# Patient Record
Sex: FEMALE | Race: WHITE | Employment: OTHER | URBAN - METROPOLITAN AREA
[De-identification: names, ages, dates, MRNs, and addresses within clinical notes are randomized per-mention and may not be internally consistent; named-entity substitution may affect disease eponyms.]

---

## 2017-01-04 ENCOUNTER — TELEPHONE (OUTPATIENT)
Dept: FAMILY MEDICINE CLINIC | Age: 47
End: 2017-01-04

## 2017-01-04 DIAGNOSIS — M48.061 LUMBAR STENOSIS: ICD-10-CM

## 2017-01-04 DIAGNOSIS — M51.26 DISC DISPLACEMENT, LUMBAR: Primary | ICD-10-CM

## 2017-01-09 RX ORDER — LOSARTAN POTASSIUM 25 MG/1
25 TABLET ORAL DAILY
Qty: 30 TABLET | Refills: 2 | Status: SHIPPED | OUTPATIENT
Start: 2017-01-09 | End: 2017-04-07 | Stop reason: SDUPTHER

## 2017-01-24 ENCOUNTER — TELEPHONE (OUTPATIENT)
Dept: BARIATRICS/WEIGHT MGMT | Age: 47
End: 2017-01-24

## 2017-02-03 ENCOUNTER — OFFICE VISIT (OUTPATIENT)
Dept: FAMILY MEDICINE CLINIC | Age: 47
End: 2017-02-03

## 2017-02-03 DIAGNOSIS — I87.2 CHRONIC VENOUS STASIS DERMATITIS OF BOTH LOWER EXTREMITIES: ICD-10-CM

## 2017-02-03 DIAGNOSIS — L03.90 CELLULITIS, UNSPECIFIED CELLULITIS SITE: Primary | ICD-10-CM

## 2017-02-03 PROCEDURE — 99214 OFFICE O/P EST MOD 30 MIN: CPT | Performed by: FAMILY MEDICINE

## 2017-02-03 RX ORDER — SULFAMETHOXAZOLE AND TRIMETHOPRIM 800; 160 MG/1; MG/1
1 TABLET ORAL 2 TIMES DAILY
COMMUNITY
End: 2017-02-03

## 2017-02-03 RX ORDER — SULFAMETHOXAZOLE AND TRIMETHOPRIM 800; 160 MG/1; MG/1
1 TABLET ORAL 2 TIMES DAILY
Qty: 20 TABLET | Refills: 0 | Status: SHIPPED | OUTPATIENT
Start: 2017-02-03 | End: 2017-02-13

## 2017-02-06 VITALS
WEIGHT: 293 LBS | HEIGHT: 68 IN | HEART RATE: 88 BPM | DIASTOLIC BLOOD PRESSURE: 90 MMHG | SYSTOLIC BLOOD PRESSURE: 150 MMHG | BODY MASS INDEX: 44.41 KG/M2 | OXYGEN SATURATION: 96 % | TEMPERATURE: 98.7 F

## 2017-02-06 ASSESSMENT — ENCOUNTER SYMPTOMS
RHINORRHEA: 0
NAIL CHANGES: 0
COUGH: 0
VOMITING: 0
DIARRHEA: 0
SHORTNESS OF BREATH: 0
EYE PAIN: 0
SORE THROAT: 0

## 2017-02-20 RX ORDER — VALACYCLOVIR HYDROCHLORIDE 1 G/1
TABLET, FILM COATED ORAL
Qty: 8 TABLET | Refills: 1 | Status: SHIPPED | OUTPATIENT
Start: 2017-02-20 | End: 2018-06-25 | Stop reason: SDUPTHER

## 2017-03-07 ENCOUNTER — TELEPHONE (OUTPATIENT)
Dept: BARIATRICS/WEIGHT MGMT | Age: 47
End: 2017-03-07

## 2017-03-14 ENCOUNTER — TELEPHONE (OUTPATIENT)
Dept: FAMILY MEDICINE CLINIC | Age: 47
End: 2017-03-14

## 2017-03-14 ENCOUNTER — OFFICE VISIT (OUTPATIENT)
Dept: BARIATRICS/WEIGHT MGMT | Age: 47
End: 2017-03-14

## 2017-03-14 VITALS
WEIGHT: 293 LBS | RESPIRATION RATE: 16 BRPM | HEIGHT: 67 IN | BODY MASS INDEX: 45.99 KG/M2 | DIASTOLIC BLOOD PRESSURE: 100 MMHG | SYSTOLIC BLOOD PRESSURE: 178 MMHG

## 2017-03-14 DIAGNOSIS — I10 HYPERTENSION, ESSENTIAL: ICD-10-CM

## 2017-03-14 DIAGNOSIS — E66.01 MORBID OBESITY WITH BMI OF 50.0-59.9, ADULT (HCC): Primary | ICD-10-CM

## 2017-03-14 DIAGNOSIS — G47.33 SLEEP APNEA, OBSTRUCTIVE: ICD-10-CM

## 2017-03-14 PROBLEM — G43.909 MIGRAINE: Status: ACTIVE | Noted: 2017-03-14

## 2017-03-14 PROBLEM — G47.419 NARCOLEPSY: Status: ACTIVE | Noted: 2017-03-14

## 2017-03-14 PROBLEM — F32.A DEPRESSION: Status: ACTIVE | Noted: 2017-03-14

## 2017-03-14 PROBLEM — F41.9 ANXIETY: Status: ACTIVE | Noted: 2017-03-14

## 2017-03-14 PROBLEM — M79.7 FIBROMYALGIA: Status: ACTIVE | Noted: 2017-03-14

## 2017-03-14 PROBLEM — M19.90 ARTHRITIS: Status: ACTIVE | Noted: 2017-03-14

## 2017-03-14 PROBLEM — M54.9 BACK PAIN: Status: ACTIVE | Noted: 2017-03-14

## 2017-03-14 PROCEDURE — 99244 OFF/OP CNSLTJ NEW/EST MOD 40: CPT | Performed by: SURGERY

## 2017-03-14 RX ORDER — DEXTROAMPHETAMINE SULFATE 10 MG/1
10 TABLET ORAL 2 TIMES DAILY
COMMUNITY

## 2017-03-14 RX ORDER — DEXTROAMPHETAMINE SACCHARATE, AMPHETAMINE ASPARTATE MONOHYDRATE, DEXTROAMPHETAMINE SULFATE AND AMPHETAMINE SULFATE 7.5; 7.5; 7.5; 7.5 MG/1; MG/1; MG/1; MG/1
30 CAPSULE, EXTENDED RELEASE ORAL 2 TIMES DAILY
COMMUNITY

## 2017-03-16 DIAGNOSIS — E66.01 MORBID OBESITY WITH BMI OF 50.0-59.9, ADULT (HCC): ICD-10-CM

## 2017-03-16 DIAGNOSIS — G47.33 SLEEP APNEA, OBSTRUCTIVE: ICD-10-CM

## 2017-03-16 DIAGNOSIS — I10 HYPERTENSION, ESSENTIAL: ICD-10-CM

## 2017-03-16 LAB
BASOPHILS ABSOLUTE: 0.1 K/UL (ref 0–0.2)
BASOPHILS RELATIVE PERCENT: 0.7 %
EOSINOPHILS ABSOLUTE: 0.2 K/UL (ref 0–0.6)
EOSINOPHILS RELATIVE PERCENT: 2.1 %
HCT VFR BLD CALC: 37.4 % (ref 36–48)
HEMOGLOBIN: 12.2 G/DL (ref 12–16)
LYMPHOCYTES ABSOLUTE: 2.3 K/UL (ref 1–5.1)
LYMPHOCYTES RELATIVE PERCENT: 30.7 %
MCH RBC QN AUTO: 24.9 PG (ref 26–34)
MCHC RBC AUTO-ENTMCNC: 32.5 G/DL (ref 31–36)
MCV RBC AUTO: 76.7 FL (ref 80–100)
MONOCYTES ABSOLUTE: 0.4 K/UL (ref 0–1.3)
MONOCYTES RELATIVE PERCENT: 5.9 %
NEUTROPHILS ABSOLUTE: 4.5 K/UL (ref 1.7–7.7)
NEUTROPHILS RELATIVE PERCENT: 60.6 %
PDW BLD-RTO: 15.5 % (ref 12.4–15.4)
PLATELET # BLD: 281 K/UL (ref 135–450)
PMV BLD AUTO: 7.5 FL (ref 5–10.5)
RBC # BLD: 4.88 M/UL (ref 4–5.2)
WBC # BLD: 7.5 K/UL (ref 4–11)

## 2017-03-17 ENCOUNTER — HOSPITAL ENCOUNTER (OUTPATIENT)
Dept: PULMONOLOGY | Age: 47
Discharge: HOME | End: 2017-03-17
Attending: SURGERY | Admitting: SURGERY

## 2017-03-17 ENCOUNTER — OFFICE VISIT (OUTPATIENT)
Dept: FAMILY MEDICINE CLINIC | Age: 47
End: 2017-03-17

## 2017-03-17 DIAGNOSIS — E66.01 MORBID OBESITY, UNSPECIFIED OBESITY TYPE (HCC): ICD-10-CM

## 2017-03-17 DIAGNOSIS — I10 ESSENTIAL HYPERTENSION: Primary | ICD-10-CM

## 2017-03-17 DIAGNOSIS — E66.01 MORBID (SEVERE) OBESITY DUE TO EXCESS CALORIES (HCC): ICD-10-CM

## 2017-03-17 LAB
A/G RATIO: 1.3 (ref 1.1–2.2)
ALBUMIN SERPL-MCNC: 3.8 G/DL (ref 3.4–5)
ALP BLD-CCNC: 82 U/L (ref 40–129)
ALT SERPL-CCNC: 12 U/L (ref 10–40)
ANION GAP SERPL CALCULATED.3IONS-SCNC: 12 MMOL/L (ref 3–16)
AST SERPL-CCNC: 15 U/L (ref 15–37)
BILIRUB SERPL-MCNC: 0.6 MG/DL (ref 0–1)
BUN BLDV-MCNC: 9 MG/DL (ref 7–20)
CALCIUM SERPL-MCNC: 8.4 MG/DL (ref 8.3–10.6)
CHLORIDE BLD-SCNC: 103 MMOL/L (ref 99–110)
CHOLESTEROL, TOTAL: 197 MG/DL (ref 0–199)
CO2: 23 MMOL/L (ref 21–32)
CREAT SERPL-MCNC: 0.5 MG/DL (ref 0.6–1.1)
ESTIMATED AVERAGE GLUCOSE: 102.5 MG/DL
FOLATE: 11.69 NG/ML (ref 4.78–24.2)
GFR AFRICAN AMERICAN: >60
GFR NON-AFRICAN AMERICAN: >60
GLOBULIN: 2.9 G/DL
GLUCOSE BLD-MCNC: 77 MG/DL (ref 70–99)
HBA1C MFR BLD: 5.2 %
HDLC SERPL-MCNC: 62 MG/DL (ref 40–60)
IRON SATURATION: 11 % (ref 15–50)
IRON: 48 UG/DL (ref 37–145)
LDL CHOLESTEROL CALCULATED: 113 MG/DL
POTASSIUM SERPL-SCNC: 4.2 MMOL/L (ref 3.5–5.1)
SODIUM BLD-SCNC: 138 MMOL/L (ref 136–145)
TOTAL IRON BINDING CAPACITY: 430 UG/DL (ref 260–445)
TOTAL PROTEIN: 6.7 G/DL (ref 6.4–8.2)
TRIGL SERPL-MCNC: 108 MG/DL (ref 0–150)
TSH REFLEX: 2.78 UIU/ML (ref 0.27–4.2)
VITAMIN B-12: 564 PG/ML (ref 211–911)
VITAMIN D 25-HYDROXY: 9.7 NG/ML
VLDLC SERPL CALC-MCNC: 22 MG/DL

## 2017-03-17 PROCEDURE — 99213 OFFICE O/P EST LOW 20 MIN: CPT | Performed by: FAMILY MEDICINE

## 2017-03-17 RX ORDER — ALBUTEROL SULFATE 90 UG/1
6 AEROSOL, METERED RESPIRATORY (INHALATION) ONCE
Status: COMPLETED | OUTPATIENT
Start: 2017-03-17 | End: 2017-03-17

## 2017-03-17 RX ADMIN — ALBUTEROL SULFATE 6 PUFF: 90 AEROSOL, METERED RESPIRATORY (INHALATION) at 10:16

## 2017-03-18 LAB — H PYLORI ANTIGEN STOOL: NEGATIVE

## 2017-03-20 ENCOUNTER — TELEPHONE (OUTPATIENT)
Dept: BARIATRICS/WEIGHT MGMT | Age: 47
End: 2017-03-20

## 2017-03-20 ENCOUNTER — HOSPITAL ENCOUNTER (OUTPATIENT)
Dept: OTHER | Age: 47
Discharge: HOME | End: 2017-03-20

## 2017-03-20 ENCOUNTER — TELEPHONE (OUTPATIENT)
Dept: FAMILY MEDICINE CLINIC | Age: 47
End: 2017-03-20

## 2017-03-20 DIAGNOSIS — E61.1 IRON DEFICIENCY: ICD-10-CM

## 2017-03-20 DIAGNOSIS — E78.2 MIXED HYPERLIPIDEMIA: Primary | ICD-10-CM

## 2017-03-20 DIAGNOSIS — E55.9 VITAMIN D DEFICIENCY: ICD-10-CM

## 2017-03-20 DIAGNOSIS — M25.552 LEFT HIP PAIN: ICD-10-CM

## 2017-03-20 DIAGNOSIS — M25.551 RIGHT HIP PAIN: ICD-10-CM

## 2017-03-20 RX ORDER — ERGOCALCIFEROL 1.25 MG/1
50000 CAPSULE ORAL WEEKLY
Qty: 12 CAPSULE | Refills: 0 | Status: SHIPPED | OUTPATIENT
Start: 2017-03-20 | End: 2017-05-23

## 2017-03-20 RX ORDER — FERROUS SULFATE 325(65) MG
325 TABLET ORAL
Qty: 90 TABLET | Refills: 0 | Status: SHIPPED | OUTPATIENT
Start: 2017-03-20 | End: 2017-09-08 | Stop reason: SDUPTHER

## 2017-03-21 VITALS
DIASTOLIC BLOOD PRESSURE: 90 MMHG | OXYGEN SATURATION: 98 % | HEIGHT: 67 IN | SYSTOLIC BLOOD PRESSURE: 130 MMHG | WEIGHT: 293 LBS | BODY MASS INDEX: 45.99 KG/M2 | HEART RATE: 84 BPM

## 2017-03-21 ASSESSMENT — ENCOUNTER SYMPTOMS
WHEEZING: 0
STRIDOR: 0
ANAL BLEEDING: 0
SHORTNESS OF BREATH: 0
BLOOD IN STOOL: 0
VOMITING: 0
CONSTIPATION: 0
ABDOMINAL PAIN: 0
DIARRHEA: 0
CHEST TIGHTNESS: 0

## 2017-03-22 ENCOUNTER — OFFICE VISIT (OUTPATIENT)
Dept: ORTHOPEDIC SURGERY | Age: 47
End: 2017-03-22

## 2017-03-22 VITALS — BODY MASS INDEX: 45.99 KG/M2 | HEIGHT: 67 IN | WEIGHT: 293 LBS

## 2017-03-22 DIAGNOSIS — M25.562 LEFT KNEE PAIN, UNSPECIFIED CHRONICITY: Primary | ICD-10-CM

## 2017-03-22 DIAGNOSIS — M17.12 PRIMARY OSTEOARTHRITIS OF LEFT KNEE: ICD-10-CM

## 2017-03-22 DIAGNOSIS — M25.9 MULTIPLE JOINT COMPLAINTS: ICD-10-CM

## 2017-03-22 DIAGNOSIS — E66.01 MORBID OBESITY DUE TO EXCESS CALORIES (HCC): ICD-10-CM

## 2017-03-22 PROCEDURE — 73562 X-RAY EXAM OF KNEE 3: CPT | Performed by: ORTHOPAEDIC SURGERY

## 2017-03-22 PROCEDURE — 20611 DRAIN/INJ JOINT/BURSA W/US: CPT | Performed by: ORTHOPAEDIC SURGERY

## 2017-03-22 PROCEDURE — 99214 OFFICE O/P EST MOD 30 MIN: CPT | Performed by: ORTHOPAEDIC SURGERY

## 2017-03-24 ENCOUNTER — TELEPHONE (OUTPATIENT)
Dept: FAMILY MEDICINE CLINIC | Age: 47
End: 2017-03-24

## 2017-03-31 DIAGNOSIS — M25.562 LEFT KNEE PAIN, UNSPECIFIED CHRONICITY: ICD-10-CM

## 2017-03-31 DIAGNOSIS — M17.12 PRIMARY OSTEOARTHRITIS OF LEFT KNEE: ICD-10-CM

## 2017-03-31 DIAGNOSIS — M25.9 MULTIPLE JOINT COMPLAINTS: ICD-10-CM

## 2017-03-31 LAB
BASOPHILS ABSOLUTE: 0 K/UL (ref 0–0.2)
BASOPHILS RELATIVE PERCENT: 0.6 %
C-REACTIVE PROTEIN: 3.4 MG/L (ref 0–5.1)
EOSINOPHILS ABSOLUTE: 0.1 K/UL (ref 0–0.6)
EOSINOPHILS RELATIVE PERCENT: 1 %
HCT VFR BLD CALC: 37.6 % (ref 36–48)
HEMOGLOBIN: 12.2 G/DL (ref 12–16)
LYMPHOCYTES ABSOLUTE: 1.9 K/UL (ref 1–5.1)
LYMPHOCYTES RELATIVE PERCENT: 25.2 %
MCH RBC QN AUTO: 24.5 PG (ref 26–34)
MCHC RBC AUTO-ENTMCNC: 32.3 G/DL (ref 31–36)
MCV RBC AUTO: 75.8 FL (ref 80–100)
MONOCYTES ABSOLUTE: 0.4 K/UL (ref 0–1.3)
MONOCYTES RELATIVE PERCENT: 5.1 %
NEUTROPHILS ABSOLUTE: 5 K/UL (ref 1.7–7.7)
NEUTROPHILS RELATIVE PERCENT: 68.1 %
PDW BLD-RTO: 15.2 % (ref 12.4–15.4)
PLATELET # BLD: 265 K/UL (ref 135–450)
PMV BLD AUTO: 7.5 FL (ref 5–10.5)
RBC # BLD: 4.96 M/UL (ref 4–5.2)
RHEUMATOID FACTOR: 182 IU/ML
SEDIMENTATION RATE, ERYTHROCYTE: 16 MM/HR (ref 0–20)
URIC ACID, SERUM: 3.5 MG/DL (ref 2.6–6)
WBC # BLD: 7.4 K/UL (ref 4–11)

## 2017-04-03 LAB
ANA INTERPRETATION: NORMAL
ANTI-NUCLEAR ANTIBODY (ANA): NEGATIVE

## 2017-04-05 RX ORDER — RIVAROXABAN 20 MG/1
TABLET, FILM COATED ORAL
Qty: 30 TABLET | Refills: 3 | Status: SHIPPED | OUTPATIENT
Start: 2017-04-05 | End: 2017-09-08 | Stop reason: SDUPTHER

## 2017-04-07 ENCOUNTER — OFFICE VISIT (OUTPATIENT)
Dept: CARDIOLOGY CLINIC | Age: 47
End: 2017-04-07

## 2017-04-07 VITALS
HEART RATE: 69 BPM | OXYGEN SATURATION: 97 % | HEIGHT: 67 IN | SYSTOLIC BLOOD PRESSURE: 160 MMHG | DIASTOLIC BLOOD PRESSURE: 100 MMHG | BODY MASS INDEX: 45.99 KG/M2 | WEIGHT: 293 LBS

## 2017-04-07 DIAGNOSIS — I10 HYPERTENSION, ESSENTIAL: ICD-10-CM

## 2017-04-07 DIAGNOSIS — Z01.810 PREOPERATIVE CARDIOVASCULAR EXAMINATION: ICD-10-CM

## 2017-04-07 DIAGNOSIS — E78.2 MIXED HYPERLIPIDEMIA: Primary | ICD-10-CM

## 2017-04-07 DIAGNOSIS — I51.7 LVH (LEFT VENTRICULAR HYPERTROPHY): ICD-10-CM

## 2017-04-07 DIAGNOSIS — Z01.818 PRE-OP TESTING: ICD-10-CM

## 2017-04-07 PROCEDURE — 93000 ELECTROCARDIOGRAM COMPLETE: CPT | Performed by: INTERNAL MEDICINE

## 2017-04-07 PROCEDURE — 99214 OFFICE O/P EST MOD 30 MIN: CPT | Performed by: INTERNAL MEDICINE

## 2017-04-07 RX ORDER — LOSARTAN POTASSIUM 25 MG/1
50 TABLET ORAL DAILY
Qty: 30 TABLET | Refills: 11 | Status: SHIPPED | OUTPATIENT
Start: 2017-04-07 | End: 2017-05-12 | Stop reason: ALTCHOICE

## 2017-04-07 RX ORDER — CYCLOBENZAPRINE HCL 5 MG
TABLET ORAL
COMMUNITY
Start: 2017-03-20 | End: 2017-05-23

## 2017-04-13 ENCOUNTER — OFFICE VISIT (OUTPATIENT)
Dept: FAMILY MEDICINE CLINIC | Age: 47
End: 2017-04-13

## 2017-04-13 VITALS
HEIGHT: 67 IN | BODY MASS INDEX: 45.99 KG/M2 | WEIGHT: 293 LBS | HEART RATE: 90 BPM | OXYGEN SATURATION: 97 % | SYSTOLIC BLOOD PRESSURE: 178 MMHG | DIASTOLIC BLOOD PRESSURE: 94 MMHG

## 2017-04-13 DIAGNOSIS — E66.01 MORBID OBESITY DUE TO EXCESS CALORIES (HCC): Primary | ICD-10-CM

## 2017-04-13 DIAGNOSIS — R76.8 ELEVATED RHEUMATOID FACTOR: ICD-10-CM

## 2017-04-13 PROCEDURE — 99213 OFFICE O/P EST LOW 20 MIN: CPT | Performed by: FAMILY MEDICINE

## 2017-04-14 ASSESSMENT — ENCOUNTER SYMPTOMS
DIARRHEA: 0
SHORTNESS OF BREATH: 0
BLOOD IN STOOL: 0
ANAL BLEEDING: 0
STRIDOR: 0
BACK PAIN: 1
ABDOMINAL PAIN: 0
CHEST TIGHTNESS: 0
VOMITING: 0
CONSTIPATION: 0
EYE PAIN: 0
WHEEZING: 0

## 2017-04-17 RX ORDER — BUPROPION HYDROCHLORIDE 300 MG/1
TABLET ORAL
Qty: 30 TABLET | Refills: 3 | Status: SHIPPED | OUTPATIENT
Start: 2017-04-17 | End: 2017-10-24 | Stop reason: SDUPTHER

## 2017-04-18 RX ORDER — LOSARTAN POTASSIUM 25 MG/1
TABLET ORAL
Qty: 30 TABLET | Refills: 3 | Status: SHIPPED | OUTPATIENT
Start: 2017-04-18 | End: 2017-05-12 | Stop reason: ALTCHOICE

## 2017-04-19 ENCOUNTER — HOSPITAL ENCOUNTER (OUTPATIENT)
Dept: NON INVASIVE DIAGNOSTICS | Age: 47
Discharge: HOME | End: 2017-04-19

## 2017-04-19 DIAGNOSIS — I51.7 CARDIOMEGALY: ICD-10-CM

## 2017-04-19 LAB
LV EF: 55 %
LV EF: 59 %
LVEF MODALITY: NORMAL
LVEF MODALITY: NORMAL

## 2017-04-20 ENCOUNTER — HOSPITAL ENCOUNTER (OUTPATIENT)
Dept: NUCLEAR MEDICINE | Age: 47
Discharge: HOME | End: 2017-04-20

## 2017-04-20 DIAGNOSIS — I51.7 CARDIOMEGALY: ICD-10-CM

## 2017-05-04 ENCOUNTER — HOSPITAL ENCOUNTER (OUTPATIENT)
Dept: OTHER | Age: 47
Discharge: HOME | End: 2017-05-04
Attending: INTERNAL MEDICINE | Admitting: INTERNAL MEDICINE

## 2017-05-04 ENCOUNTER — OFFICE VISIT (OUTPATIENT)
Dept: RHEUMATOLOGY | Age: 47
End: 2017-05-04

## 2017-05-04 VITALS — SYSTOLIC BLOOD PRESSURE: 120 MMHG | TEMPERATURE: 98 F | DIASTOLIC BLOOD PRESSURE: 84 MMHG

## 2017-05-04 DIAGNOSIS — M05.741 RHEUMATOID ARTHRITIS INVOLVING BOTH HANDS WITH POSITIVE RHEUMATOID FACTOR (HCC): Primary | ICD-10-CM

## 2017-05-04 DIAGNOSIS — M05.742 RHEUMATOID ARTHRITIS INVOLVING BOTH HANDS WITH POSITIVE RHEUMATOID FACTOR (HCC): ICD-10-CM

## 2017-05-04 DIAGNOSIS — M05.742 RHEUMATOID ARTHRITIS INVOLVING BOTH HANDS WITH POSITIVE RHEUMATOID FACTOR (HCC): Primary | ICD-10-CM

## 2017-05-04 DIAGNOSIS — M15.9 PRIMARY OSTEOARTHRITIS INVOLVING MULTIPLE JOINTS: ICD-10-CM

## 2017-05-04 DIAGNOSIS — M05.741 RHEUMATOID ARTHRITIS INVOLVING BOTH HANDS WITH POSITIVE RHEUMATOID FACTOR (HCC): ICD-10-CM

## 2017-05-04 LAB
HEPATITIS B CORE IGM ANTIBODY: NORMAL
HEPATITIS B SURFACE ANTIGEN INTERPRETATION: NORMAL
HEPATITIS C ANTIBODY INTERPRETATION: NORMAL
RHEUMATOID FACTOR: 202 IU/ML

## 2017-05-04 PROCEDURE — 99244 OFF/OP CNSLTJ NEW/EST MOD 40: CPT | Performed by: INTERNAL MEDICINE

## 2017-05-12 ENCOUNTER — OFFICE VISIT (OUTPATIENT)
Dept: CARDIOLOGY CLINIC | Age: 47
End: 2017-05-12

## 2017-05-12 VITALS
HEIGHT: 67 IN | BODY MASS INDEX: 45.99 KG/M2 | OXYGEN SATURATION: 99 % | WEIGHT: 293 LBS | DIASTOLIC BLOOD PRESSURE: 80 MMHG | SYSTOLIC BLOOD PRESSURE: 158 MMHG | HEART RATE: 80 BPM

## 2017-05-12 DIAGNOSIS — I27.82 OTHER CHRONIC PULMONARY EMBOLISM WITHOUT ACUTE COR PULMONALE (HCC): ICD-10-CM

## 2017-05-12 DIAGNOSIS — E66.01 MORBID OBESITY WITH BMI OF 50.0-59.9, ADULT (HCC): ICD-10-CM

## 2017-05-12 DIAGNOSIS — I10 HYPERTENSION, ESSENTIAL: Primary | ICD-10-CM

## 2017-05-12 DIAGNOSIS — G47.33 SLEEP APNEA, OBSTRUCTIVE: ICD-10-CM

## 2017-05-12 DIAGNOSIS — I51.7 LVH (LEFT VENTRICULAR HYPERTROPHY): ICD-10-CM

## 2017-05-12 DIAGNOSIS — E78.2 MIXED HYPERLIPIDEMIA: ICD-10-CM

## 2017-05-12 PROCEDURE — 99214 OFFICE O/P EST MOD 30 MIN: CPT | Performed by: NURSE PRACTITIONER

## 2017-05-12 RX ORDER — LOSARTAN POTASSIUM AND HYDROCHLOROTHIAZIDE 12.5; 5 MG/1; MG/1
1 TABLET ORAL DAILY
Qty: 30 TABLET | Refills: 3 | Status: SHIPPED | OUTPATIENT
Start: 2017-05-12 | End: 2017-09-08 | Stop reason: SDUPTHER

## 2017-05-18 ENCOUNTER — OFFICE VISIT (OUTPATIENT)
Dept: FAMILY MEDICINE CLINIC | Age: 47
End: 2017-05-18

## 2017-05-18 DIAGNOSIS — I10 ESSENTIAL HYPERTENSION: ICD-10-CM

## 2017-05-18 DIAGNOSIS — M17.12 PRIMARY OSTEOARTHRITIS OF LEFT KNEE: Chronic | ICD-10-CM

## 2017-05-18 DIAGNOSIS — E66.01 MORBID OBESITY WITH BMI OF 50.0-59.9, ADULT (HCC): Primary | ICD-10-CM

## 2017-05-18 PROCEDURE — 99213 OFFICE O/P EST LOW 20 MIN: CPT | Performed by: FAMILY MEDICINE

## 2017-05-18 PROCEDURE — 96160 PT-FOCUSED HLTH RISK ASSMT: CPT | Performed by: FAMILY MEDICINE

## 2017-05-18 ASSESSMENT — PATIENT HEALTH QUESTIONNAIRE - PHQ9
1. LITTLE INTEREST OR PLEASURE IN DOING THINGS: 3
4. FEELING TIRED OR HAVING LITTLE ENERGY: 3
10. IF YOU CHECKED OFF ANY PROBLEMS, HOW DIFFICULT HAVE THESE PROBLEMS MADE IT FOR YOU TO DO YOUR WORK, TAKE CARE OF THINGS AT HOME, OR GET ALONG WITH OTHER PEOPLE: 3
3. TROUBLE FALLING OR STAYING ASLEEP: 3
7. TROUBLE CONCENTRATING ON THINGS, SUCH AS READING THE NEWSPAPER OR WATCHING TELEVISION: 3
SUM OF ALL RESPONSES TO PHQ9 QUESTIONS 1 & 2: 6
6. FEELING BAD ABOUT YOURSELF - OR THAT YOU ARE A FAILURE OR HAVE LET YOURSELF OR YOUR FAMILY DOWN: 3
9. THOUGHTS THAT YOU WOULD BE BETTER OFF DEAD, OR OF HURTING YOURSELF: 0
5. POOR APPETITE OR OVEREATING: 0
8. MOVING OR SPEAKING SO SLOWLY THAT OTHER PEOPLE COULD HAVE NOTICED. OR THE OPPOSITE, BEING SO FIGETY OR RESTLESS THAT YOU HAVE BEEN MOVING AROUND A LOT MORE THAN USUAL: 0
2. FEELING DOWN, DEPRESSED OR HOPELESS: 3
SUM OF ALL RESPONSES TO PHQ QUESTIONS 1-9: 18

## 2017-05-19 RX ORDER — CITALOPRAM 40 MG/1
TABLET ORAL
Qty: 30 TABLET | Refills: 0 | Status: SHIPPED | OUTPATIENT
Start: 2017-05-19 | End: 2017-10-24 | Stop reason: SDUPTHER

## 2017-05-21 VITALS
HEART RATE: 83 BPM | BODY MASS INDEX: 45.99 KG/M2 | OXYGEN SATURATION: 98 % | WEIGHT: 293 LBS | HEIGHT: 67 IN | SYSTOLIC BLOOD PRESSURE: 142 MMHG | DIASTOLIC BLOOD PRESSURE: 94 MMHG

## 2017-05-21 ASSESSMENT — ENCOUNTER SYMPTOMS
CHEST TIGHTNESS: 0
CONSTIPATION: 0
VOMITING: 0
EYE PAIN: 0
DIARRHEA: 0
SHORTNESS OF BREATH: 0
ABDOMINAL PAIN: 0
WHEEZING: 0
BLOOD IN STOOL: 0
ANAL BLEEDING: 0
STRIDOR: 0

## 2017-05-23 ENCOUNTER — TELEPHONE (OUTPATIENT)
Dept: CARDIOLOGY CLINIC | Age: 47
End: 2017-05-23

## 2017-05-25 ENCOUNTER — HOSPITAL ENCOUNTER (OUTPATIENT)
Dept: SURGERY | Age: 47
Discharge: HOME | End: 2017-05-25
Attending: OBSTETRICS & GYNECOLOGY | Admitting: OBSTETRICS & GYNECOLOGY

## 2017-05-25 VITALS
BODY MASS INDEX: 44.41 KG/M2 | SYSTOLIC BLOOD PRESSURE: 131 MMHG | HEIGHT: 68 IN | TEMPERATURE: 97 F | RESPIRATION RATE: 13 BRPM | HEART RATE: 70 BPM | OXYGEN SATURATION: 98 % | DIASTOLIC BLOOD PRESSURE: 68 MMHG | WEIGHT: 293 LBS

## 2017-05-25 LAB — PREGNANCY, URINE: NEGATIVE

## 2017-05-25 RX ORDER — SODIUM CHLORIDE 0.9 % (FLUSH) 0.9 %
10 SYRINGE (ML) INJECTION PRN
Status: DISCONTINUED | OUTPATIENT
Start: 2017-05-25 | End: 2017-05-26 | Stop reason: HOSPADM

## 2017-05-25 RX ORDER — SODIUM CHLORIDE, SODIUM LACTATE, POTASSIUM CHLORIDE, CALCIUM CHLORIDE 600; 310; 30; 20 MG/100ML; MG/100ML; MG/100ML; MG/100ML
INJECTION, SOLUTION INTRAVENOUS CONTINUOUS
Status: DISCONTINUED | OUTPATIENT
Start: 2017-05-25 | End: 2017-05-26 | Stop reason: HOSPADM

## 2017-05-25 RX ORDER — MEPERIDINE HYDROCHLORIDE 50 MG/ML
12.5 INJECTION INTRAMUSCULAR; INTRAVENOUS; SUBCUTANEOUS EVERY 5 MIN PRN
Status: DISCONTINUED | OUTPATIENT
Start: 2017-05-25 | End: 2017-05-26 | Stop reason: HOSPADM

## 2017-05-25 RX ORDER — LIDOCAINE HYDROCHLORIDE 10 MG/ML
1 INJECTION, SOLUTION EPIDURAL; INFILTRATION; INTRACAUDAL; PERINEURAL
Status: ACTIVE | OUTPATIENT
Start: 2017-05-25 | End: 2017-05-25

## 2017-05-25 RX ORDER — OXYCODONE HYDROCHLORIDE AND ACETAMINOPHEN 5; 325 MG/1; MG/1
1 TABLET ORAL PRN
Status: COMPLETED | OUTPATIENT
Start: 2017-05-25 | End: 2017-05-25

## 2017-05-25 RX ORDER — MORPHINE SULFATE 2 MG/ML
1 INJECTION, SOLUTION INTRAMUSCULAR; INTRAVENOUS EVERY 5 MIN PRN
Status: DISCONTINUED | OUTPATIENT
Start: 2017-05-25 | End: 2017-05-26 | Stop reason: HOSPADM

## 2017-05-25 RX ORDER — OXYCODONE HYDROCHLORIDE AND ACETAMINOPHEN 5; 325 MG/1; MG/1
2 TABLET ORAL PRN
Status: COMPLETED | OUTPATIENT
Start: 2017-05-25 | End: 2017-05-25

## 2017-05-25 RX ORDER — HYDRALAZINE HYDROCHLORIDE 20 MG/ML
5 INJECTION INTRAMUSCULAR; INTRAVENOUS EVERY 10 MIN PRN
Status: DISCONTINUED | OUTPATIENT
Start: 2017-05-25 | End: 2017-05-26 | Stop reason: HOSPADM

## 2017-05-25 RX ORDER — ONDANSETRON 2 MG/ML
4 INJECTION INTRAMUSCULAR; INTRAVENOUS PRN
Status: DISCONTINUED | OUTPATIENT
Start: 2017-05-25 | End: 2017-05-26 | Stop reason: HOSPADM

## 2017-05-25 RX ORDER — LABETALOL HYDROCHLORIDE 5 MG/ML
5 INJECTION, SOLUTION INTRAVENOUS EVERY 10 MIN PRN
Status: DISCONTINUED | OUTPATIENT
Start: 2017-05-25 | End: 2017-05-26 | Stop reason: HOSPADM

## 2017-05-25 RX ORDER — SODIUM CHLORIDE 0.9 % (FLUSH) 0.9 %
10 SYRINGE (ML) INJECTION EVERY 12 HOURS SCHEDULED
Status: DISCONTINUED | OUTPATIENT
Start: 2017-05-25 | End: 2017-05-26 | Stop reason: HOSPADM

## 2017-05-25 RX ORDER — DIPHENHYDRAMINE HYDROCHLORIDE 50 MG/ML
12.5 INJECTION INTRAMUSCULAR; INTRAVENOUS
Status: ACTIVE | OUTPATIENT
Start: 2017-05-25 | End: 2017-05-25

## 2017-05-25 RX ORDER — PROMETHAZINE HYDROCHLORIDE 25 MG/ML
6.25 INJECTION, SOLUTION INTRAMUSCULAR; INTRAVENOUS
Status: DISCONTINUED | OUTPATIENT
Start: 2017-05-25 | End: 2017-05-26 | Stop reason: HOSPADM

## 2017-05-25 RX ORDER — MORPHINE SULFATE 2 MG/ML
2 INJECTION, SOLUTION INTRAMUSCULAR; INTRAVENOUS EVERY 5 MIN PRN
Status: DISCONTINUED | OUTPATIENT
Start: 2017-05-25 | End: 2017-05-26 | Stop reason: HOSPADM

## 2017-05-25 RX ORDER — LEVOFLOXACIN 5 MG/ML
500 INJECTION, SOLUTION INTRAVENOUS
Status: COMPLETED | OUTPATIENT
Start: 2017-05-25 | End: 2017-05-25

## 2017-05-25 RX ADMIN — LEVOFLOXACIN 500 MG: 5 INJECTION, SOLUTION INTRAVENOUS at 12:43

## 2017-05-25 RX ADMIN — OXYCODONE HYDROCHLORIDE AND ACETAMINOPHEN 2 TABLET: 5; 325 TABLET ORAL at 14:25

## 2017-05-25 RX ADMIN — SODIUM CHLORIDE, SODIUM LACTATE, POTASSIUM CHLORIDE, CALCIUM CHLORIDE: 600; 310; 30; 20 INJECTION, SOLUTION INTRAVENOUS at 12:24

## 2017-05-25 ASSESSMENT — PAIN - FUNCTIONAL ASSESSMENT: PAIN_FUNCTIONAL_ASSESSMENT: 0-10

## 2017-05-25 ASSESSMENT — PAIN SCALES - GENERAL
PAINLEVEL_OUTOF10: 0
PAINLEVEL_OUTOF10: 8

## 2017-06-08 ENCOUNTER — OFFICE VISIT (OUTPATIENT)
Dept: FAMILY MEDICINE CLINIC | Age: 47
End: 2017-06-08

## 2017-06-08 ENCOUNTER — OFFICE VISIT (OUTPATIENT)
Dept: BARIATRICS/WEIGHT MGMT | Age: 47
End: 2017-06-08

## 2017-06-08 VITALS
OXYGEN SATURATION: 96 % | SYSTOLIC BLOOD PRESSURE: 144 MMHG | BODY MASS INDEX: 56.38 KG/M2 | HEART RATE: 92 BPM | DIASTOLIC BLOOD PRESSURE: 98 MMHG | TEMPERATURE: 98.8 F | WEIGHT: 293 LBS

## 2017-06-08 VITALS
DIASTOLIC BLOOD PRESSURE: 87 MMHG | BODY MASS INDEX: 45.99 KG/M2 | HEIGHT: 67 IN | RESPIRATION RATE: 16 BRPM | HEART RATE: 88 BPM | SYSTOLIC BLOOD PRESSURE: 139 MMHG | WEIGHT: 293 LBS

## 2017-06-08 DIAGNOSIS — I10 HYPERTENSION, ESSENTIAL: ICD-10-CM

## 2017-06-08 DIAGNOSIS — K52.9 GASTROENTERITIS: Primary | ICD-10-CM

## 2017-06-08 DIAGNOSIS — E78.2 MIXED HYPERLIPIDEMIA: ICD-10-CM

## 2017-06-08 DIAGNOSIS — R11.0 NAUSEATED: ICD-10-CM

## 2017-06-08 DIAGNOSIS — G47.33 SLEEP APNEA, OBSTRUCTIVE: ICD-10-CM

## 2017-06-08 DIAGNOSIS — E66.01 MORBID OBESITY WITH BMI OF 50.0-59.9, ADULT (HCC): Primary | ICD-10-CM

## 2017-06-08 PROCEDURE — 96372 THER/PROPH/DIAG INJ SC/IM: CPT | Performed by: FAMILY MEDICINE

## 2017-06-08 PROCEDURE — 99214 OFFICE O/P EST MOD 30 MIN: CPT | Performed by: FAMILY MEDICINE

## 2017-06-08 PROCEDURE — 99213 OFFICE O/P EST LOW 20 MIN: CPT | Performed by: SURGERY

## 2017-06-08 RX ORDER — PROMETHAZINE HYDROCHLORIDE 25 MG/1
25 TABLET ORAL EVERY 8 HOURS PRN
Qty: 20 TABLET | Refills: 0 | Status: SHIPPED | OUTPATIENT
Start: 2017-06-08 | End: 2017-06-15

## 2017-06-08 RX ORDER — ONDANSETRON 4 MG/1
4 TABLET, FILM COATED ORAL EVERY 8 HOURS PRN
Qty: 20 TABLET | Refills: 0 | Status: ON HOLD | OUTPATIENT
Start: 2017-06-08 | End: 2017-11-30 | Stop reason: HOSPADM

## 2017-06-08 RX ORDER — PROMETHAZINE HYDROCHLORIDE 25 MG/ML
50 INJECTION, SOLUTION INTRAMUSCULAR; INTRAVENOUS ONCE
Status: COMPLETED | OUTPATIENT
Start: 2017-06-08 | End: 2017-06-08

## 2017-06-08 RX ORDER — LOSARTAN POTASSIUM 25 MG/1
TABLET ORAL
COMMUNITY
Start: 2017-04-20 | End: 2017-11-15 | Stop reason: ALTCHOICE

## 2017-06-08 RX ORDER — ONDANSETRON 4 MG/1
TABLET, FILM COATED ORAL
Qty: 20 TABLET | Refills: 0 | OUTPATIENT
Start: 2017-06-08

## 2017-06-08 RX ADMIN — PROMETHAZINE HYDROCHLORIDE 50 MG: 25 INJECTION, SOLUTION INTRAMUSCULAR; INTRAVENOUS at 15:43

## 2017-06-09 ENCOUNTER — TELEPHONE (OUTPATIENT)
Dept: FAMILY MEDICINE CLINIC | Age: 47
End: 2017-06-09

## 2017-06-09 DIAGNOSIS — M25.559 HIP PAIN, ACUTE, UNSPECIFIED LATERALITY: Primary | ICD-10-CM

## 2017-06-12 ENCOUNTER — TELEPHONE (OUTPATIENT)
Dept: FAMILY MEDICINE CLINIC | Age: 47
End: 2017-06-12

## 2017-06-13 ASSESSMENT — ENCOUNTER SYMPTOMS
COUGH: 0
SWOLLEN GLANDS: 0
NAUSEA: 1
SORE THROAT: 0
VOMITING: 1
ABDOMINAL PAIN: 1
CHANGE IN BOWEL HABIT: 1

## 2017-06-15 ENCOUNTER — OFFICE VISIT (OUTPATIENT)
Dept: FAMILY MEDICINE CLINIC | Age: 47
End: 2017-06-15

## 2017-06-15 DIAGNOSIS — M70.71 HIP BURSITIS, RIGHT: ICD-10-CM

## 2017-06-15 DIAGNOSIS — M25.551 RIGHT HIP PAIN: ICD-10-CM

## 2017-06-15 DIAGNOSIS — R30.0 DYSURIA: Primary | ICD-10-CM

## 2017-06-15 LAB
BILIRUBIN, POC: NORMAL
BLOOD URINE, POC: NORMAL
CLARITY, POC: NORMAL
COLOR, POC: NORMAL
GLUCOSE URINE, POC: NORMAL
KETONES, POC: NORMAL
LEUKOCYTE EST, POC: NORMAL
NITRITE, POC: NORMAL
PH, POC: 6
PROTEIN, POC: NORMAL
SPECIFIC GRAVITY, POC: 1.02
UROBILINOGEN, POC: NORMAL

## 2017-06-15 PROCEDURE — 20610 DRAIN/INJ JOINT/BURSA W/O US: CPT | Performed by: FAMILY MEDICINE

## 2017-06-15 PROCEDURE — 81002 URINALYSIS NONAUTO W/O SCOPE: CPT | Performed by: FAMILY MEDICINE

## 2017-06-15 PROCEDURE — 99213 OFFICE O/P EST LOW 20 MIN: CPT | Performed by: FAMILY MEDICINE

## 2017-06-15 RX ORDER — SULFAMETHOXAZOLE AND TRIMETHOPRIM 800; 160 MG/1; MG/1
1 TABLET ORAL 2 TIMES DAILY
Qty: 20 TABLET | Refills: 0 | Status: SHIPPED | OUTPATIENT
Start: 2017-06-15 | End: 2017-06-25

## 2017-06-15 RX ORDER — METHYLPREDNISOLONE ACETATE 80 MG/ML
120 INJECTION, SUSPENSION INTRA-ARTICULAR; INTRALESIONAL; INTRAMUSCULAR; SOFT TISSUE ONCE
Status: COMPLETED | OUTPATIENT
Start: 2017-06-15 | End: 2017-06-15

## 2017-06-15 RX ADMIN — METHYLPREDNISOLONE ACETATE 120 MG: 80 INJECTION, SUSPENSION INTRA-ARTICULAR; INTRALESIONAL; INTRAMUSCULAR; SOFT TISSUE at 16:12

## 2017-06-17 LAB
ORGANISM: ABNORMAL
URINE CULTURE, ROUTINE: ABNORMAL
URINE CULTURE, ROUTINE: ABNORMAL

## 2017-06-18 VITALS
HEART RATE: 92 BPM | SYSTOLIC BLOOD PRESSURE: 138 MMHG | BODY MASS INDEX: 53.07 KG/M2 | WEIGHT: 293 LBS | OXYGEN SATURATION: 98 % | DIASTOLIC BLOOD PRESSURE: 88 MMHG

## 2017-06-18 ASSESSMENT — ENCOUNTER SYMPTOMS
CHEST TIGHTNESS: 0
DIARRHEA: 0
WHEEZING: 0
CONSTIPATION: 0
VOMITING: 0
ANAL BLEEDING: 0
SHORTNESS OF BREATH: 0
STRIDOR: 0
BLOOD IN STOOL: 0
ABDOMINAL PAIN: 0

## 2017-06-20 ENCOUNTER — OFFICE VISIT (OUTPATIENT)
Dept: ORTHOPEDIC SURGERY | Age: 47
End: 2017-06-20

## 2017-06-20 VITALS
HEART RATE: 84 BPM | BODY MASS INDEX: 44.41 KG/M2 | WEIGHT: 293 LBS | SYSTOLIC BLOOD PRESSURE: 173 MMHG | DIASTOLIC BLOOD PRESSURE: 114 MMHG | HEIGHT: 68 IN

## 2017-06-20 DIAGNOSIS — M54.16 LUMBAR RADICULOPATHY: Primary | ICD-10-CM

## 2017-06-20 PROCEDURE — 99214 OFFICE O/P EST MOD 30 MIN: CPT | Performed by: PHYSICIAN ASSISTANT

## 2017-08-03 ENCOUNTER — OFFICE VISIT (OUTPATIENT)
Dept: BARIATRICS/WEIGHT MGMT | Age: 47
End: 2017-08-03

## 2017-08-03 VITALS
DIASTOLIC BLOOD PRESSURE: 88 MMHG | SYSTOLIC BLOOD PRESSURE: 138 MMHG | HEART RATE: 94 BPM | RESPIRATION RATE: 16 BRPM | HEIGHT: 67 IN | BODY MASS INDEX: 45.99 KG/M2 | WEIGHT: 293 LBS

## 2017-08-03 DIAGNOSIS — E61.1 IRON DEFICIENCY: ICD-10-CM

## 2017-08-03 DIAGNOSIS — I10 HYPERTENSION, ESSENTIAL: ICD-10-CM

## 2017-08-03 DIAGNOSIS — E66.01 MORBID OBESITY WITH BMI OF 50.0-59.9, ADULT (HCC): Primary | ICD-10-CM

## 2017-08-03 DIAGNOSIS — G47.33 SLEEP APNEA, OBSTRUCTIVE: ICD-10-CM

## 2017-08-03 DIAGNOSIS — E78.2 MIXED HYPERLIPIDEMIA: ICD-10-CM

## 2017-08-03 DIAGNOSIS — E55.9 VITAMIN D DEFICIENCY: ICD-10-CM

## 2017-08-03 PROCEDURE — 99213 OFFICE O/P EST LOW 20 MIN: CPT | Performed by: NURSE PRACTITIONER

## 2017-08-03 ASSESSMENT — ENCOUNTER SYMPTOMS
GASTROINTESTINAL NEGATIVE: 1
RESPIRATORY NEGATIVE: 1
EYES NEGATIVE: 1

## 2017-08-21 ENCOUNTER — OFFICE VISIT (OUTPATIENT)
Dept: ORTHOPEDIC SURGERY | Age: 47
End: 2017-08-21

## 2017-08-21 VITALS
HEIGHT: 67 IN | BODY MASS INDEX: 45.99 KG/M2 | SYSTOLIC BLOOD PRESSURE: 150 MMHG | HEART RATE: 75 BPM | WEIGHT: 293 LBS | DIASTOLIC BLOOD PRESSURE: 90 MMHG

## 2017-08-21 DIAGNOSIS — M17.12 PRIMARY OSTEOARTHRITIS OF LEFT KNEE: Primary | ICD-10-CM

## 2017-08-21 PROCEDURE — 20611 DRAIN/INJ JOINT/BURSA W/US: CPT | Performed by: ORTHOPAEDIC SURGERY

## 2017-08-24 ENCOUNTER — OFFICE VISIT (OUTPATIENT)
Dept: FAMILY MEDICINE CLINIC | Age: 47
End: 2017-08-24

## 2017-08-24 VITALS
BODY MASS INDEX: 58.7 KG/M2 | OXYGEN SATURATION: 98 % | WEIGHT: 293 LBS | DIASTOLIC BLOOD PRESSURE: 88 MMHG | HEART RATE: 82 BPM | SYSTOLIC BLOOD PRESSURE: 138 MMHG

## 2017-08-24 DIAGNOSIS — E66.01 MORBID OBESITY, UNSPECIFIED OBESITY TYPE (HCC): Primary | ICD-10-CM

## 2017-08-24 PROCEDURE — 99213 OFFICE O/P EST LOW 20 MIN: CPT | Performed by: FAMILY MEDICINE

## 2017-08-25 ASSESSMENT — ENCOUNTER SYMPTOMS
CHEST TIGHTNESS: 0
STRIDOR: 0
BLOOD IN STOOL: 0
WHEEZING: 0
CONSTIPATION: 0
EYE PAIN: 0
ANAL BLEEDING: 0
DIARRHEA: 0
ABDOMINAL PAIN: 0
VOMITING: 0
SHORTNESS OF BREATH: 0

## 2017-09-01 ENCOUNTER — PATIENT MESSAGE (OUTPATIENT)
Dept: FAMILY MEDICINE CLINIC | Age: 47
End: 2017-09-01

## 2017-09-07 ENCOUNTER — OFFICE VISIT (OUTPATIENT)
Dept: BARIATRICS/WEIGHT MGMT | Age: 47
End: 2017-09-07

## 2017-09-07 ENCOUNTER — TELEPHONE (OUTPATIENT)
Dept: FAMILY MEDICINE CLINIC | Age: 47
End: 2017-09-07

## 2017-09-07 VITALS
HEIGHT: 67 IN | WEIGHT: 293 LBS | RESPIRATION RATE: 16 BRPM | BODY MASS INDEX: 45.99 KG/M2 | DIASTOLIC BLOOD PRESSURE: 81 MMHG | HEART RATE: 86 BPM | SYSTOLIC BLOOD PRESSURE: 118 MMHG

## 2017-09-07 DIAGNOSIS — I50.31 ACUTE DIASTOLIC CONGESTIVE HEART FAILURE (HCC): ICD-10-CM

## 2017-09-07 DIAGNOSIS — E66.01 MORBID OBESITY WITH BMI OF 50.0-59.9, ADULT (HCC): Primary | ICD-10-CM

## 2017-09-07 DIAGNOSIS — E78.2 MIXED HYPERLIPIDEMIA: ICD-10-CM

## 2017-09-07 DIAGNOSIS — I10 HYPERTENSION, ESSENTIAL: ICD-10-CM

## 2017-09-07 DIAGNOSIS — G47.33 SLEEP APNEA, OBSTRUCTIVE: ICD-10-CM

## 2017-09-07 PROCEDURE — 99212 OFFICE O/P EST SF 10 MIN: CPT | Performed by: SURGERY

## 2017-09-08 DIAGNOSIS — E61.1 IRON DEFICIENCY: ICD-10-CM

## 2017-09-08 RX ORDER — LOSARTAN POTASSIUM AND HYDROCHLOROTHIAZIDE 12.5; 5 MG/1; MG/1
1 TABLET ORAL DAILY
Qty: 90 TABLET | Refills: 1 | Status: SHIPPED | OUTPATIENT
Start: 2017-09-08 | End: 2018-03-21 | Stop reason: SDUPTHER

## 2017-09-12 ENCOUNTER — OFFICE VISIT (OUTPATIENT)
Dept: FAMILY MEDICINE CLINIC | Age: 47
End: 2017-09-12

## 2017-09-12 DIAGNOSIS — E66.01 MORBID OBESITY WITH BMI OF 50.0-59.9, ADULT (HCC): Primary | ICD-10-CM

## 2017-09-12 PROCEDURE — 99213 OFFICE O/P EST LOW 20 MIN: CPT | Performed by: FAMILY MEDICINE

## 2017-09-13 VITALS
DIASTOLIC BLOOD PRESSURE: 82 MMHG | SYSTOLIC BLOOD PRESSURE: 130 MMHG | BODY MASS INDEX: 56.7 KG/M2 | OXYGEN SATURATION: 96 % | HEART RATE: 84 BPM | WEIGHT: 293 LBS

## 2017-09-13 RX ORDER — FERROUS SULFATE 325(65) MG
325 TABLET ORAL
Qty: 90 TABLET | Refills: 0 | Status: SHIPPED | OUTPATIENT
Start: 2017-09-13 | End: 2017-12-18 | Stop reason: SDUPTHER

## 2017-09-13 RX ORDER — ONDANSETRON 4 MG/1
4 TABLET, FILM COATED ORAL EVERY 8 HOURS PRN
Qty: 20 TABLET | Refills: 1 | Status: SHIPPED | OUTPATIENT
Start: 2017-09-13 | End: 2017-11-21 | Stop reason: SDUPTHER

## 2017-09-13 ASSESSMENT — ENCOUNTER SYMPTOMS
DIARRHEA: 0
SHORTNESS OF BREATH: 0
EYE PAIN: 0
ABDOMINAL PAIN: 0
STRIDOR: 0
ANAL BLEEDING: 0
BLOOD IN STOOL: 0
CONSTIPATION: 0
CHEST TIGHTNESS: 0
WHEEZING: 0
VOMITING: 0

## 2017-09-14 ENCOUNTER — OFFICE VISIT (OUTPATIENT)
Dept: PULMONOLOGY | Age: 47
End: 2017-09-14

## 2017-09-14 VITALS
OXYGEN SATURATION: 97 % | WEIGHT: 293 LBS | RESPIRATION RATE: 18 BRPM | HEART RATE: 84 BPM | SYSTOLIC BLOOD PRESSURE: 128 MMHG | HEIGHT: 68 IN | BODY MASS INDEX: 44.41 KG/M2 | TEMPERATURE: 98 F | DIASTOLIC BLOOD PRESSURE: 74 MMHG

## 2017-09-14 DIAGNOSIS — G47.33 OSA (OBSTRUCTIVE SLEEP APNEA): ICD-10-CM

## 2017-09-14 DIAGNOSIS — Z01.818 PRE-OPERATIVE CLEARANCE: Primary | ICD-10-CM

## 2017-09-14 PROCEDURE — 99214 OFFICE O/P EST MOD 30 MIN: CPT | Performed by: INTERNAL MEDICINE

## 2017-09-14 ASSESSMENT — SLEEP AND FATIGUE QUESTIONNAIRES
HOW LIKELY ARE YOU TO NOD OFF OR FALL ASLEEP WHILE LYING DOWN TO REST IN THE AFTERNOON WHEN CIRCUMSTANCES PERMIT: 3
HOW LIKELY ARE YOU TO NOD OFF OR FALL ASLEEP WHILE SITTING QUIETLY AFTER LUNCH WITHOUT ALCOHOL: 1
HOW LIKELY ARE YOU TO NOD OFF OR FALL ASLEEP WHILE SITTING AND READING: 3
HOW LIKELY ARE YOU TO NOD OFF OR FALL ASLEEP WHEN YOU ARE A PASSENGER IN A CAR FOR AN HOUR WITHOUT A BREAK: 2
ESS TOTAL SCORE: 10
HOW LIKELY ARE YOU TO NOD OFF OR FALL ASLEEP WHILE SITTING AND TALKING TO SOMEONE: 0
HOW LIKELY ARE YOU TO NOD OFF OR FALL ASLEEP IN A CAR, WHILE STOPPED FOR A FEW MINUTES IN TRAFFIC: 0
HOW LIKELY ARE YOU TO NOD OFF OR FALL ASLEEP WHILE SITTING INACTIVE IN A PUBLIC PLACE: 0
NECK CIRCUMFERENCE (INCHES): 18.5
HOW LIKELY ARE YOU TO NOD OFF OR FALL ASLEEP WHILE WATCHING TV: 1

## 2017-09-19 ENCOUNTER — HOSPITAL ENCOUNTER (OUTPATIENT)
Dept: CT IMAGING | Age: 47
Discharge: HOME | End: 2017-09-19
Attending: FAMILY MEDICINE | Admitting: FAMILY MEDICINE

## 2017-09-19 ENCOUNTER — OFFICE VISIT (OUTPATIENT)
Dept: FAMILY MEDICINE CLINIC | Age: 47
End: 2017-09-19

## 2017-09-19 VITALS
WEIGHT: 293 LBS | DIASTOLIC BLOOD PRESSURE: 88 MMHG | HEART RATE: 95 BPM | BODY MASS INDEX: 57.02 KG/M2 | SYSTOLIC BLOOD PRESSURE: 138 MMHG | OXYGEN SATURATION: 97 %

## 2017-09-19 DIAGNOSIS — R10.31 RIGHT LOWER QUADRANT ABDOMINAL PAIN: Primary | ICD-10-CM

## 2017-09-19 DIAGNOSIS — R10.31 ABDOMINAL PAIN, RIGHT LOWER QUADRANT: ICD-10-CM

## 2017-09-19 DIAGNOSIS — R10.31 RIGHT LOWER QUADRANT PAIN: ICD-10-CM

## 2017-09-19 DIAGNOSIS — R32 URINARY INCONTINENCE, UNSPECIFIED TYPE: ICD-10-CM

## 2017-09-19 LAB
ANION GAP SERPL CALCULATED.3IONS-SCNC: 8 MMOL/L (ref 3–16)
BILIRUBIN, POC: NORMAL
BLOOD URINE, POC: NORMAL
BUN BLDV-MCNC: 6 MG/DL (ref 7–20)
CALCIUM SERPL-MCNC: 8.5 MG/DL (ref 8.3–10.6)
CHLORIDE BLD-SCNC: 100 MMOL/L (ref 99–110)
CLARITY, POC: CLEAR
CO2: 27 MMOL/L (ref 21–32)
COLOR, POC: YELLOW
CREAT SERPL-MCNC: <0.5 MG/DL (ref 0.6–1.1)
GFR AFRICAN AMERICAN: >60
GFR NON-AFRICAN AMERICAN: >60
GLUCOSE BLD-MCNC: 97 MG/DL (ref 70–99)
GLUCOSE URINE, POC: NORMAL
KETONES, POC: NORMAL
LEUKOCYTE EST, POC: NORMAL
NITRITE, POC: NORMAL
PH, POC: 0.2
POTASSIUM SERPL-SCNC: 3.9 MMOL/L (ref 3.5–5.1)
PROTEIN, POC: NORMAL
SODIUM BLD-SCNC: 135 MMOL/L (ref 136–145)
SPECIFIC GRAVITY, POC: 1.01
UROBILINOGEN, POC: 0.2

## 2017-09-19 PROCEDURE — 99213 OFFICE O/P EST LOW 20 MIN: CPT | Performed by: FAMILY MEDICINE

## 2017-09-19 PROCEDURE — 81002 URINALYSIS NONAUTO W/O SCOPE: CPT | Performed by: FAMILY MEDICINE

## 2017-09-21 ENCOUNTER — TELEPHONE (OUTPATIENT)
Dept: FAMILY MEDICINE CLINIC | Age: 47
End: 2017-09-21

## 2017-09-21 ASSESSMENT — ENCOUNTER SYMPTOMS
CHEST TIGHTNESS: 0
ABDOMINAL PAIN: 1
WHEEZING: 0
DIARRHEA: 0
BLOOD IN STOOL: 0
ANAL BLEEDING: 0
VOMITING: 0
SHORTNESS OF BREATH: 1
EYE PAIN: 0
STRIDOR: 0
CONSTIPATION: 0

## 2017-09-26 ENCOUNTER — HOSPITAL ENCOUNTER (OUTPATIENT)
Dept: PHYSICAL THERAPY | Age: 47
Discharge: HOME | End: 2017-09-30

## 2017-09-26 NOTE — FLOWSHEET NOTE
comments)   [] Plan of care initiated [] Hold pending MD visit [] Discharge    See Weekly Progress Note: [] Yes  [x] No  Next due:

## 2017-09-27 ENCOUNTER — OFFICE VISIT (OUTPATIENT)
Dept: FAMILY MEDICINE CLINIC | Age: 47
End: 2017-09-27

## 2017-09-27 VITALS
OXYGEN SATURATION: 95 % | HEART RATE: 86 BPM | DIASTOLIC BLOOD PRESSURE: 92 MMHG | SYSTOLIC BLOOD PRESSURE: 144 MMHG | BODY MASS INDEX: 57.63 KG/M2 | WEIGHT: 293 LBS

## 2017-09-27 DIAGNOSIS — M54.31 RIGHT SIDED SCIATICA: ICD-10-CM

## 2017-09-27 DIAGNOSIS — M70.61 TROCHANTERIC BURSITIS OF RIGHT HIP: ICD-10-CM

## 2017-09-27 DIAGNOSIS — M54.41 LUMBAGO WITH SCIATICA, RIGHT SIDE: ICD-10-CM

## 2017-09-27 DIAGNOSIS — M25.551 PAIN OF RIGHT HIP JOINT: Primary | ICD-10-CM

## 2017-09-27 DIAGNOSIS — Z86.711 HISTORY OF PULMONARY EMBOLISM: ICD-10-CM

## 2017-09-27 PROCEDURE — 99214 OFFICE O/P EST MOD 30 MIN: CPT | Performed by: FAMILY MEDICINE

## 2017-09-27 PROCEDURE — 20610 DRAIN/INJ JOINT/BURSA W/O US: CPT | Performed by: FAMILY MEDICINE

## 2017-09-27 RX ORDER — KETOROLAC TROMETHAMINE 30 MG/ML
60 INJECTION, SOLUTION INTRAMUSCULAR; INTRAVENOUS ONCE
Status: COMPLETED | OUTPATIENT
Start: 2017-09-27 | End: 2017-09-27

## 2017-09-27 RX ORDER — METHYLPREDNISOLONE ACETATE 80 MG/ML
120 INJECTION, SUSPENSION INTRA-ARTICULAR; INTRALESIONAL; INTRAMUSCULAR; SOFT TISSUE ONCE
Status: COMPLETED | OUTPATIENT
Start: 2017-09-27 | End: 2017-09-27

## 2017-09-27 RX ADMIN — KETOROLAC TROMETHAMINE 60 MG: 30 INJECTION, SOLUTION INTRAMUSCULAR; INTRAVENOUS at 12:42

## 2017-09-27 RX ADMIN — METHYLPREDNISOLONE ACETATE 120 MG: 80 INJECTION, SUSPENSION INTRA-ARTICULAR; INTRALESIONAL; INTRAMUSCULAR; SOFT TISSUE at 12:43

## 2017-09-28 ENCOUNTER — TELEPHONE (OUTPATIENT)
Dept: FAMILY MEDICINE CLINIC | Age: 47
End: 2017-09-28

## 2017-09-30 ASSESSMENT — ENCOUNTER SYMPTOMS
STRIDOR: 0
CONSTIPATION: 0
ABDOMINAL PAIN: 0
WHEEZING: 0
ANAL BLEEDING: 0
BLOOD IN STOOL: 0
BACK PAIN: 1
EYE PAIN: 0
CHEST TIGHTNESS: 0
VOMITING: 0
DIARRHEA: 0
SHORTNESS OF BREATH: 0

## 2017-10-02 ENCOUNTER — HOSPITAL ENCOUNTER (OUTPATIENT)
Dept: VASCULAR LAB | Age: 47
Discharge: HOME | End: 2017-10-02
Attending: EMERGENCY MEDICINE | Admitting: EMERGENCY MEDICINE

## 2017-10-02 DIAGNOSIS — R10.31 RIGHT LOWER QUADRANT PAIN: ICD-10-CM

## 2017-10-03 ENCOUNTER — OFFICE VISIT (OUTPATIENT)
Dept: FAMILY MEDICINE CLINIC | Age: 47
End: 2017-10-03

## 2017-10-03 VITALS
HEART RATE: 91 BPM | SYSTOLIC BLOOD PRESSURE: 138 MMHG | BODY MASS INDEX: 55.44 KG/M2 | OXYGEN SATURATION: 98 % | DIASTOLIC BLOOD PRESSURE: 90 MMHG | WEIGHT: 293 LBS

## 2017-10-03 DIAGNOSIS — E66.01 MORBID OBESITY WITH BMI OF 50.0-59.9, ADULT (HCC): Primary | ICD-10-CM

## 2017-10-03 PROCEDURE — 99213 OFFICE O/P EST LOW 20 MIN: CPT | Performed by: FAMILY MEDICINE

## 2017-10-05 ENCOUNTER — OFFICE VISIT (OUTPATIENT)
Dept: BARIATRICS/WEIGHT MGMT | Age: 47
End: 2017-10-05

## 2017-10-05 VITALS
SYSTOLIC BLOOD PRESSURE: 138 MMHG | BODY MASS INDEX: 45.99 KG/M2 | HEIGHT: 67 IN | RESPIRATION RATE: 16 BRPM | WEIGHT: 293 LBS | DIASTOLIC BLOOD PRESSURE: 88 MMHG | HEART RATE: 102 BPM

## 2017-10-05 DIAGNOSIS — E78.2 MIXED HYPERLIPIDEMIA: ICD-10-CM

## 2017-10-05 DIAGNOSIS — I50.31 ACUTE DIASTOLIC CONGESTIVE HEART FAILURE (HCC): ICD-10-CM

## 2017-10-05 DIAGNOSIS — E66.01 MORBID OBESITY WITH BMI OF 50.0-59.9, ADULT (HCC): Primary | ICD-10-CM

## 2017-10-05 DIAGNOSIS — I10 HYPERTENSION, ESSENTIAL: ICD-10-CM

## 2017-10-05 DIAGNOSIS — G47.33 SLEEP APNEA, OBSTRUCTIVE: ICD-10-CM

## 2017-10-05 PROCEDURE — 99213 OFFICE O/P EST LOW 20 MIN: CPT | Performed by: SURGERY

## 2017-10-05 ASSESSMENT — ENCOUNTER SYMPTOMS
CONSTIPATION: 0
STRIDOR: 0
DIARRHEA: 0
WHEEZING: 0
ABDOMINAL PAIN: 0
ANAL BLEEDING: 0
VOMITING: 0
BLOOD IN STOOL: 0
BACK PAIN: 1
CHEST TIGHTNESS: 0
SHORTNESS OF BREATH: 0

## 2017-10-05 NOTE — PROGRESS NOTES
Millie Boyd lost 4.2 lbs over past month. Pt reports she has been chewing ice a lot more lately. Breakfast: 1 egg with whole wheat toast with I can't believe its not butter with skim milk with string cheese    Snack: not usually     Lunch: string cheese, with tuna salad (homemade) with celery/cucumbers    Snack: homemade popsicle- just water and water flavorings     Dinner: baked chicken/fish with Mrs. Corona with veggies (frozen broccoli, mixed veggies)     Snack: not usually. Fluids: Crystal light, she has cut all sodas out, skim milk, coffee ( using 1/2 caff)    Is pt consuming smaller portions? she is doing better     Is pt consuming at least 64 oz of fluids per day? yes    Is pt consuming carbonated, caffeinated, or sugary beverages? yes- 1/2 caff coffee    Has pt sampled Unjury and/or Nectar protein?  Not yet     Exercise:  Doing PT for her back in the water 2 x week for 30 minutes     Plan/Recommendations: switch to decaf, include protein with snack at least 1 x day, try protein powder     Handouts: none     Carly Bynum

## 2017-10-05 NOTE — PROGRESS NOTES
[facility]) Physicians   General & Laparoscopic Surgery  [employer]       HPI:     Rose Marie Mock is a very pleasant 49 y.o. female with Body mass index is 55.22 kg/(m^2). , Pre-Surgery. Pre-operative clearance and work up done. Working hard to keep good dietary habits as well level of activity. Patient denies any nausea, vomiting, fevers, chills, shortness of breath, chest pain, cough, constipation or difficulty urinating. Pain Assessment   Denies any abdominal pain       Past Medical History:   Diagnosis Date    ADHD (attention deficit hyperactivity disorder)     Adopted     Blood transfusion     GI BLEEDING-COUMADIN    Degenerative disc disease     DVT (deep venous thrombosis) (HCC)     Fibromyalgia     Hx of blood clots     PE and DVT    Hypertension     controlled lately    Hypertrophic cardiomyopathy ([address]     mild, echo 2016    Hypoglycemia     checks BS daily    Lupus anticoagulant disorder ([address]     PE (pulmonary embolism)     Pneumonia     Pulmonary edema 03/2016    Rheumatoid arthritis [facility]     Sleep apnea     CPAP     Past Surgical History:   Procedure Laterality Date    CHOLECYSTECTOMY, LAPAROSCOPIC      FOOT SURGERY      bone spur  - rt foot    HYSTEROSCOPY  05/25/2017    D & C    KNEE ARTHROSCOPY  2006    LEFT    KNEE ARTHROSCOPY Left 28/56/1249    UMBILICAL HERNIA REPAIR  11/05/2012    LAPAROSCOPIC WITH PHYSIO MESH     Family History   Problem Relation Age of Onset    Adopted: Yes     Social History   Substance Use Topics    Smoking status: Never Smoker    Smokeless tobacco: Never Used    Alcohol use No     I counseled the patient on the importance of not smoking and risks of ETOH.    Allergies   Allergen Reactions    Rocephin [Ceftriaxone Sodium In Dextrose] Hives     ITCH    Lisinopril Other (See Comments)     coughing     Vitals:    10/05/17 1451 10/05/17 1455   BP: (!) 140/88 138/88   Pulse: 102    Resp: 16    Weight: (!) 352 lb 9.6 oz (159.9 kg)    Height: 5' 7\" (1.702 m)        Body mass index is 55.22 kg/(m^2). Current Outpatient Prescriptions:     rivaroxaban (XARELTO) 20 MG TABS tablet, TAKE 1 TABLET BY MOUTH EVERY 24 HOURS, Disp: 30 tablet, Rfl: 3    ferrous sulfate 325 (65 Fe) MG tablet, Take 1 tablet by mouth Daily with supper, Disp: 90 tablet, Rfl: 0    ondansetron (ZOFRAN) 4 MG tablet, Take 1 tablet by mouth every 8 hours as needed for Nausea or Vomiting, Disp: 20 tablet, Rfl: 1    losartan-hydrochlorothiazide (HYZAAR) 50-12.5 MG per tablet, Take 1 tablet by mouth daily, Disp: 90 tablet, Rfl: 1    losartan (COZAAR) 25 MG tablet, , Disp: , Rfl:     ondansetron (ZOFRAN) 4 MG tablet, Take 1 tablet by mouth every 8 hours as needed for Nausea or Vomiting, Disp: 20 tablet, Rfl: 0    vitamin D (CHOLECALCIFEROL) 1000 UNIT TABS tablet, Take 1,000 Units by mouth daily, Disp: , Rfl:     citalopram (CELEXA) 40 MG tablet, TAKE 1 TABLET BY MOUTH DAILY, Disp: 30 tablet, Rfl: 0    buPROPion (WELLBUTRIN XL) 300 MG extended release tablet, TAKE 1 TABLET BY MOUTH DAILY, Disp: 30 tablet, Rfl: 3    dextroamphetamine (DEXTROSTAT) 10 MG tablet, Take 10 mg by mouth 2 times daily . , Disp: , Rfl:     amphetamine-dextroamphetamine (ADDERALL XR) 30 MG extended release capsule, Take 30 mg by mouth 2 times daily . , Disp: , Rfl:     valACYclovir (VALTREX) 1 G tablet, TAKE 2 TABLETS BY MOUTH EVERY 12 HOURS FOR 2 DAYS, Disp: 8 tablet, Rfl: 1    albuterol sulfate HFA (PROAIR HFA) 108 (90 BASE) MCG/ACT inhaler, Inhale 2 puffs into the lungs every 6 hours as needed for Wheezing, Disp: 1 Inhaler, Rfl: 0    oxyCODONE-acetaminophen (PERCOCET) 5-325 MG per tablet, Take 1 to 2 tabs by mouth every 6 to 8 hours as needed for pain., Disp: 20 tablet, Rfl: 0    furosemide (LASIX) 40 MG tablet, Take 1 tablet by mouth daily (Patient taking differently: Take 40 mg by mouth as needed ), Disp: 60 tablet, Rfl: 1      Review of Systems - History obtained from the

## 2017-10-26 RX ORDER — CITALOPRAM 40 MG/1
TABLET ORAL
Qty: 30 TABLET | Refills: 11 | Status: SHIPPED | OUTPATIENT
Start: 2017-10-26 | End: 2019-03-01 | Stop reason: SDUPTHER

## 2017-10-26 RX ORDER — BUPROPION HYDROCHLORIDE 300 MG/1
TABLET ORAL
Qty: 30 TABLET | Refills: 11 | Status: SHIPPED | OUTPATIENT
Start: 2017-10-26 | End: 2018-10-24 | Stop reason: SDUPTHER

## 2017-10-27 ENCOUNTER — TELEPHONE (OUTPATIENT)
Dept: BARIATRICS/WEIGHT MGMT | Age: 47
End: 2017-10-27

## 2017-10-31 ENCOUNTER — OFFICE VISIT (OUTPATIENT)
Dept: BARIATRICS/WEIGHT MGMT | Age: 47
End: 2017-10-31

## 2017-10-31 ENCOUNTER — TELEPHONE (OUTPATIENT)
Dept: FAMILY MEDICINE CLINIC | Age: 47
End: 2017-10-31

## 2017-10-31 VITALS
BODY MASS INDEX: 45.99 KG/M2 | DIASTOLIC BLOOD PRESSURE: 70 MMHG | HEIGHT: 67 IN | HEART RATE: 90 BPM | SYSTOLIC BLOOD PRESSURE: 130 MMHG | RESPIRATION RATE: 16 BRPM | WEIGHT: 293 LBS

## 2017-10-31 DIAGNOSIS — E66.01 MORBID OBESITY WITH BMI OF 50.0-59.9, ADULT (HCC): Primary | ICD-10-CM

## 2017-10-31 PROCEDURE — 99999 PR OFFICE/OUTPT VISIT,PROCEDURE ONLY: CPT | Performed by: NURSE PRACTITIONER

## 2017-10-31 NOTE — PROGRESS NOTES
Namrata Solorio gained 3.4 lbs over 3 weeks. After dietary evaluation and education, patient is considered to be a good surgical candidate from a dietary perspective. Patient was educated on sleeve dietary protocol. Pre-operative and post-operative diet guidelines were discussed and written information was provided. Nectar and Unjury protein supplements were purchased. Vitamin regimen with Bariatric Fusion vitamins was explained, and patient purchased a 1 month supply at this visit. Importance of vitamin compliance was discussed and potential of vitamin deficiencies was reviewed. Encouraged continued physical activity. Patient signed agreement stating they are committed to lifelong follow up, smoking and alcohol cessation, vitamin compliance, and 2 week pre-operative dietary protocol.

## 2017-10-31 NOTE — TELEPHONE ENCOUNTER
Patient called and states that she needed an extension on her medical leave. Patient states that Mago Jones is stating that they need it by the end of the day today. Patient states that she has been off work for the past 30 days.

## 2017-11-01 ENCOUNTER — HOSPITAL ENCOUNTER (OUTPATIENT)
Dept: OTHER | Age: 47
Discharge: HOME | End: 2017-11-30

## 2017-11-01 ENCOUNTER — OFFICE VISIT (OUTPATIENT)
Dept: FAMILY MEDICINE CLINIC | Age: 47
End: 2017-11-01

## 2017-11-01 VITALS
OXYGEN SATURATION: 98 % | WEIGHT: 293 LBS | BODY MASS INDEX: 55.13 KG/M2 | DIASTOLIC BLOOD PRESSURE: 86 MMHG | HEART RATE: 78 BPM | SYSTOLIC BLOOD PRESSURE: 140 MMHG

## 2017-11-01 DIAGNOSIS — Z01.818 PRE-OP EXAM: ICD-10-CM

## 2017-11-01 PROCEDURE — G8417 CALC BMI ABV UP PARAM F/U: HCPCS | Performed by: FAMILY MEDICINE

## 2017-11-01 PROCEDURE — 99243 OFF/OP CNSLTJ NEW/EST LOW 30: CPT | Performed by: FAMILY MEDICINE

## 2017-11-01 PROCEDURE — G8484 FLU IMMUNIZE NO ADMIN: HCPCS | Performed by: FAMILY MEDICINE

## 2017-11-01 PROCEDURE — G8427 DOCREV CUR MEDS BY ELIG CLIN: HCPCS | Performed by: FAMILY MEDICINE

## 2017-11-01 NOTE — PROGRESS NOTES
Subjective:      Patient ID: Abundio Dumont is a 49 y.o. female. HPI: 49 y.o. in for   Chief Complaint   Patient presents with    Other     needs and extension from last note      Pt has Body mass index is 55.13 kg/m². Getting bariatric surgery this month  Has multiple musculoskeletal pain complaints that are worsening. Most have been attributed to OA/ fibro/ and Morbid obesity. Pt cannot work and is asking for an extension of time off until her surgery at which point Dr Darin Rosas will take over and let her know when she can return. Right Hip and low back pain with right sided sciatica      Review of Systems   Constitutional: Negative for chills, fatigue, fever and unexpected weight change. HENT: Negative for ear pain, hearing loss and postnasal drip. Eyes: Negative for pain. Respiratory: Negative for chest tightness, shortness of breath, wheezing and stridor. Cardiovascular: Negative for chest pain and palpitations. Gastrointestinal: Negative for abdominal pain, anal bleeding, blood in stool, constipation, diarrhea and vomiting. Genitourinary: Negative for difficulty urinating and dysuria. Musculoskeletal: Positive for arthralgias and back pain. Negative for neck pain. Skin: Negative for rash. BP (!) 140/86 (Site: Right Arm, Position: Sitting)   Pulse 78   Wt (!) 352 lb (159.7 kg)   SpO2 98%   BMI 55.13 kg/m²    Objective:   Physical Exam   Constitutional: She appears well-developed and well-nourished. No distress. HENT:   Head: Normocephalic and atraumatic. Eyes: Conjunctivae and EOM are normal. Right eye exhibits no discharge. Left eye exhibits no discharge. No scleral icterus. Neck: Normal range of motion. Neck supple. No tracheal deviation present. No thyromegaly present. Cardiovascular: Normal rate, regular rhythm and normal heart sounds. Exam reveals no gallop and no friction rub. No murmur heard.   Pulmonary/Chest: Effort normal and breath sounds normal. No respiratory distress. She has no wheezes. She has no rales. She exhibits no tenderness. Abdominal: Soft. Bowel sounds are normal. She exhibits no distension and no mass. There is no tenderness. There is no rebound and no guarding. Morbid obesity   Musculoskeletal: She exhibits tenderness. Lymphadenopathy:     She has no cervical adenopathy. Skin: No rash noted. She is not diaphoretic. Nursing note and vitals reviewed. Assessment:      Preop, morbid obesity, bariatric surgery      Plan:        Patient is on Xarelto because of history of recurrent pulmonary emboli. Her pulmonologist wanted her to bridge with Lovenox when going off of it in order to decrease the time that she is not anticoagulated to one day. Discontinue the xarelto 3 days before the surgery and start the Lovenox . Cleared for the procedure    Mc Zavala was seen today for pre-op exam and other. Diagnoses and all orders for this visit:    Class 3 obesity due to excess calories with serious comorbidity and body mass index (BMI) of 50.0 to 59.9 in adult [facility])    Pre-op exam  -     Comprehensive Metabolic Panel    Other orders  -     enoxaparin (LOVENOX) 100 MG/ML injection;  Inject 1 mL into the skin 2 times daily for 7 days

## 2017-11-01 NOTE — LETTER
[facility] [address]  Phone: [phone]  Fax: [phone]    Amanda Alvarez MD        November 1, 2017     Patient: Jamir Martin   YOB: 1970   Date of Visit: 11/1/2017       To Whom It May Concern: It is my medical opinion that my patient,  Maren Landrum, needs to remain home from work until her surgery on November 29 2017, at which time the surgeon will take over the recommendation of when she can return. If you have any questions or concerns, please don't hesitate to call.     Sincerely,        Amanda Alvarez MD

## 2017-11-02 ASSESSMENT — ENCOUNTER SYMPTOMS
VOMITING: 0
WHEEZING: 0
CHEST TIGHTNESS: 0
BLOOD IN STOOL: 0
BACK PAIN: 1
STRIDOR: 0
DIARRHEA: 0
ANAL BLEEDING: 0
CONSTIPATION: 0
EYE PAIN: 0
ABDOMINAL PAIN: 0
SHORTNESS OF BREATH: 0

## 2017-11-15 ENCOUNTER — TELEPHONE (OUTPATIENT)
Dept: CARDIOLOGY CLINIC | Age: 47
End: 2017-11-15

## 2017-11-15 NOTE — TELEPHONE ENCOUNTER
Pt called stating she spoke with Weight Management and the informed the pt they do not have cardiac clx notation in epic for pt's upcoming gastric sx on 11-29-17. Please see if pt can be cleared for procedure. If pt needs to be reached please call her at [phone].

## 2017-11-21 RX ORDER — ONDANSETRON 4 MG/1
4 TABLET, FILM COATED ORAL EVERY 8 HOURS PRN
Qty: 20 TABLET | Refills: 2 | Status: ON HOLD | OUTPATIENT
Start: 2017-11-21 | End: 2017-11-30 | Stop reason: HOSPADM

## 2017-11-22 ENCOUNTER — HOSPITAL ENCOUNTER (OUTPATIENT)
Dept: OTHER | Age: 47
Discharge: HOME | End: 2017-11-22
Attending: SURGERY | Admitting: SURGERY

## 2017-11-22 LAB
A/G RATIO: 1.1 (ref 1.1–2.2)
ALBUMIN SERPL-MCNC: 3.6 G/DL (ref 3.4–5)
ALP BLD-CCNC: 84 U/L (ref 40–129)
ALT SERPL-CCNC: 28 U/L (ref 10–40)
ANION GAP SERPL CALCULATED.3IONS-SCNC: 15 MMOL/L (ref 3–16)
AST SERPL-CCNC: 33 U/L (ref 15–37)
BILIRUB SERPL-MCNC: 0.3 MG/DL (ref 0–1)
BUN BLDV-MCNC: 12 MG/DL (ref 7–20)
CALCIUM SERPL-MCNC: 9.2 MG/DL (ref 8.3–10.6)
CHLORIDE BLD-SCNC: 95 MMOL/L (ref 99–110)
CO2: 28 MMOL/L (ref 21–32)
CREAT SERPL-MCNC: 0.7 MG/DL (ref 0.6–1.1)
GFR AFRICAN AMERICAN: >60
GFR NON-AFRICAN AMERICAN: >60
GLOBULIN: 3.3 G/DL
GLUCOSE BLD-MCNC: 81 MG/DL (ref 70–99)
HCT VFR BLD CALC: 36 % (ref 36–48)
HEMOGLOBIN: 11.8 G/DL (ref 12–16)
MCH RBC QN AUTO: 23 PG (ref 26–34)
MCHC RBC AUTO-ENTMCNC: 32.7 G/DL (ref 31–36)
MCV RBC AUTO: 70.2 FL (ref 80–100)
PDW BLD-RTO: 17.7 % (ref 12.4–15.4)
PLATELET # BLD: 260 K/UL (ref 135–450)
PMV BLD AUTO: 7.8 FL (ref 5–10.5)
POTASSIUM SERPL-SCNC: 3.5 MMOL/L (ref 3.5–5.1)
RBC # BLD: 5.13 M/UL (ref 4–5.2)
SODIUM BLD-SCNC: 138 MMOL/L (ref 136–145)
TOTAL PROTEIN: 6.9 G/DL (ref 6.4–8.2)
WBC # BLD: 6.7 K/UL (ref 4–11)

## 2017-11-29 PROBLEM — K76.0 FATTY LIVER: Status: ACTIVE | Noted: 2017-11-29

## 2017-11-30 PROBLEM — Z98.84 S/P LAPAROSCOPIC SLEEVE GASTRECTOMY: Status: ACTIVE | Noted: 2017-11-30

## 2017-12-01 ENCOUNTER — HOSPITAL ENCOUNTER (OUTPATIENT)
Dept: OTHER | Age: 47
Discharge: HOME | End: 2017-12-31

## 2017-12-04 ENCOUNTER — TELEPHONE (OUTPATIENT)
Dept: BARIATRICS/WEIGHT MGMT | Age: 47
End: 2017-12-04

## 2017-12-04 DIAGNOSIS — Z98.84 S/P LAPAROSCOPIC SLEEVE GASTRECTOMY: Primary | ICD-10-CM

## 2017-12-11 ENCOUNTER — OFFICE VISIT (OUTPATIENT)
Dept: BARIATRICS/WEIGHT MGMT | Age: 47
End: 2017-12-11

## 2017-12-11 VITALS
HEART RATE: 93 BPM | SYSTOLIC BLOOD PRESSURE: 150 MMHG | WEIGHT: 293 LBS | BODY MASS INDEX: 45.99 KG/M2 | HEIGHT: 67 IN | DIASTOLIC BLOOD PRESSURE: 102 MMHG

## 2017-12-11 DIAGNOSIS — G47.33 SLEEP APNEA, OBSTRUCTIVE: ICD-10-CM

## 2017-12-11 DIAGNOSIS — E78.2 MIXED HYPERLIPIDEMIA: ICD-10-CM

## 2017-12-11 DIAGNOSIS — I10 HYPERTENSION, ESSENTIAL: ICD-10-CM

## 2017-12-11 DIAGNOSIS — K76.0 FATTY LIVER: ICD-10-CM

## 2017-12-11 DIAGNOSIS — Z98.84 S/P LAPAROSCOPIC SLEEVE GASTRECTOMY: Primary | ICD-10-CM

## 2017-12-11 DIAGNOSIS — E66.01 MORBID OBESITY WITH BMI OF 50.0-59.9, ADULT (HCC): ICD-10-CM

## 2017-12-11 PROCEDURE — 99024 POSTOP FOLLOW-UP VISIT: CPT | Performed by: NURSE PRACTITIONER

## 2017-12-11 ASSESSMENT — ENCOUNTER SYMPTOMS
RESPIRATORY NEGATIVE: 1
GASTROINTESTINAL NEGATIVE: 1
EYES NEGATIVE: 1
ROS SKIN COMMENTS: SURGICAL INCISIONS.
ALLERGIC/IMMUNOLOGIC NEGATIVE: 1

## 2017-12-11 NOTE — PROGRESS NOTES
Dietary Assessment Note    Vitals:   Vitals:    12/11/17 1314   BP: (!) 148/103   Pulse: 100   Weight: (!) 328 lb (148.8 kg)   Height: 5' 7\" (1.702 m)    Patient lost 28 lbs over past 6 weeks. Total Weight Loss: 28 lbs    Labs reviewed: labs reviewed, I note that Glu 123 H, Hgb/Hct/MCV/MCH L    Protein intake: 60-80 grams/day     Fluid intake: 48-64 oz/day    Multivitamin/mineral intake: taking, reports she tolerates it better when she has eaten food     Calcium intake: Reports she is not taking because they are too hard. Other: none     Exercise: Yes. Walking     Nutrition Assessment: 2 week s/p sleeve post-op visit. Eating 4-7 times/day     Breakfast: SF pudding     Snack: unsweetened applesauce     Lunch: protein shake made with water, or skim milk with ice     Snack: carbmaster yogurt     Dinner: protein shake made with water, or skim milk with ice     Snack: SF pudding     Fluids: protein shake, water, propel    Amount able to eat at a time? Only a few spoonfuls (2 oz)     Following 30-30-30 Rule? She is following 30-30-30 Rule, reports she needs to slow down her eating    Consuming only Pureed/Full liquids? She has had some chunky soups, states she got \"bored\" with pureed    Food Intolerances/issues: none    Client Concerns: none     Goals: Presented and reviewed phase 3 diet advancement for pt to advance to at 3 weeks.  Needs to slow down drinking, educated pt regarding importance of following diet as instructed and to resume pureed diet until advancement date, take MVI and Calcium    Plan: F/U at 6 weeks    Inderjit England

## 2017-12-11 NOTE — PROGRESS NOTES
lb (148.8 kg)    Height: 5' 7\" (1.702 m)      Body mass index is 51.37 kg/m². Current Outpatient Prescriptions:     omeprazole (PRILOSEC) 20 MG delayed release capsule, Take 1 capsule by mouth Daily, Disp: 30 capsule, Rfl: 1    ondansetron (ZOFRAN ODT) 4 MG disintegrating tablet, Take 2 tablets by mouth every 8 hours as needed for Nausea, Disp: 30 tablet, Rfl: 1    buPROPion (WELLBUTRIN XL) 300 MG extended release tablet, TAKE (1) TABLET BY MOUTH ONCE DAILY, Disp: 30 tablet, Rfl: 11    citalopram (CELEXA) 40 MG tablet, TAKE (1) TABLET BY MOUTH ONCE DAILY, Disp: 30 tablet, Rfl: 11    rivaroxaban (XARELTO) 20 MG TABS tablet, TAKE 1 TABLET BY MOUTH EVERY 24 HOURS, Disp: 30 tablet, Rfl: 3    ferrous sulfate 325 (65 Fe) MG tablet, Take 1 tablet by mouth Daily with supper, Disp: 90 tablet, Rfl: 0    losartan-hydrochlorothiazide (HYZAAR) 50-12.5 MG per tablet, Take 1 tablet by mouth daily, Disp: 90 tablet, Rfl: 1    dextroamphetamine (DEXTROSTAT) 10 MG tablet, Take 10 mg by mouth 2 times daily . , Disp: , Rfl:     amphetamine-dextroamphetamine (ADDERALL XR) 30 MG extended release capsule, Take 30 mg by mouth 2 times daily . , Disp: , Rfl:     valACYclovir (VALTREX) 1 G tablet, TAKE 2 TABLETS BY MOUTH EVERY 12 HOURS FOR 2 DAYS, Disp: 8 tablet, Rfl: 1    albuterol sulfate HFA (PROAIR HFA) 108 (90 BASE) MCG/ACT inhaler, Inhale 2 puffs into the lungs every 6 hours as needed for Wheezing, Disp: 1 Inhaler, Rfl: 0    furosemide (LASIX) 40 MG tablet, Take 1 tablet by mouth daily (Patient taking differently: Take 40 mg by mouth as needed ), Disp: 60 tablet, Rfl: 1    Review of Systems   Constitutional: Negative. HENT: Negative. Eyes: Negative. Respiratory: Negative. Cardiovascular: Negative. Gastrointestinal: Negative. Endocrine: Negative. Genitourinary: Negative. Musculoskeletal: Negative. Skin:        Surgical incisions. Allergic/Immunologic: Negative. Neurological: Negative. Hematological: Negative. Psychiatric/Behavioral: Negative. Objective:   Physical Exam   Constitutional: She is oriented to person, place, and time. She appears well-developed and well-nourished. HENT:   Head: Normocephalic and atraumatic. Eyes: Conjunctivae are normal. Pupils are equal, round, and reactive to light. Neck: Normal range of motion. Neck supple. Cardiovascular: Normal rate. Pulmonary/Chest: Effort normal.   Abdominal: Soft. Musculoskeletal: Normal range of motion. Neurological: She is alert and oriented to person, place, and time. Skin: Skin is warm and dry. Surgical incisions healing well. Psychiatric: She has a normal mood and affect. Her behavior is normal. Judgment and thought content normal.   Vitals reviewed. Assessment and Plan:     Patient is 2 weeks s/p sleeve gastrectomy, down 28 lbs. The patient's current Body mass index is 51.37 kg/m². (12/11/17). She is doing well, denies n/v/dysphagia or reflux. She is tolerating diet, getting adequate fluids and protein, but decided to advance her diet. Discussed with patient that the phases of the diet were designed for safety and that it needs to be followed as presented. Taking vitamins as recommended, except for the Citracal. Discussed that she may take four Fusion per day. She did meet with the registered dietitian for continued follow up. I agree with recommendations and plan. She is walking. Encouraged continued physical activity. Bowels and bladder functioning. Patient may begin to take pills whole again, but take only one at a time and allow a minute or so in between each one. Incisions healing well, c/d/i. Continue no heavy lifting or submersion in pools or tubs for 6 weeks after surgery.      Pathology Results:    FINAL DIAGNOSIS:    A Partial gastric body from sleeve resection showing no diagnostic  Abnormality    B Liver needle biopsy showing mild fatty change and focal liver cell  necrosis, see comment    COMMENT:  The lobular architecture of the liver is maintained. The trichrome stain  shows no scarring of portal tracts, central veins or sinusoids. Portal  tracts show no inflammatory infiltrate, interface hepatitis, cholangitis  of any variety or ductopenia. The lobule shows mild fatty change  involving 10% of liver cells with a pericentral distribution. Additional  features warranting its characterization as steatohepatitis (e.g.  hepatocellular ballooning, Mae bodies, neutrophilic infiltrate,  sinusoidal fibrosis) are not present There are sparse small  lymphohistiocytic aggregates or a rare apoptotic body reflecting sparse  foci of liver cell necrosis. There are no cholestatic changes. Iron  accumulation or PAS positive diastase resistant globules suggestive of  alpha-1-antitrypsin accumulation are not detected with special stain. The biopsy shows mild fatty change and sparse foci of liver cell  necrosis, non-specific findings. Among possible contributing factors to  be considered include obesity, diabetes, hyperlipidemias, alcohol,  medications. There is no hepatic scarring. Review and discussed pathology with patient. Will repeat patient's LFT's in 6 months. We will see her back in 4 weeks for continued follow up.

## 2017-12-13 ENCOUNTER — TELEPHONE (OUTPATIENT)
Dept: ORTHOPEDIC SURGERY | Age: 47
End: 2017-12-13

## 2017-12-13 NOTE — TELEPHONE ENCOUNTER
GAVE [facility] MEDICAL RECORDS ([facility]) 12/06 TO THE PRESENT TO JEFFY CASTANEDA TO SCAN INTO MRO FOR OOD

## 2017-12-18 DIAGNOSIS — E61.1 IRON DEFICIENCY: ICD-10-CM

## 2017-12-19 RX ORDER — FERROUS SULFATE 325(65) MG
325 TABLET ORAL
Qty: 90 TABLET | Refills: 0 | Status: SHIPPED | OUTPATIENT
Start: 2017-12-19 | End: 2018-08-07

## 2017-12-29 ENCOUNTER — TELEPHONE (OUTPATIENT)
Dept: ORTHOPEDIC SURGERY | Age: 47
End: 2017-12-29

## 2017-12-29 ENCOUNTER — TELEPHONE (OUTPATIENT)
Dept: FAMILY MEDICINE CLINIC | Age: 47
End: 2017-12-29

## 2017-12-29 DIAGNOSIS — M25.569 KNEE PAIN, UNSPECIFIED CHRONICITY, UNSPECIFIED LATERALITY: Primary | ICD-10-CM

## 2018-01-05 ENCOUNTER — OFFICE VISIT (OUTPATIENT)
Dept: FAMILY MEDICINE CLINIC | Age: 48
End: 2018-01-05

## 2018-01-05 VITALS
OXYGEN SATURATION: 98 % | HEART RATE: 78 BPM | BODY MASS INDEX: 48.87 KG/M2 | SYSTOLIC BLOOD PRESSURE: 122 MMHG | DIASTOLIC BLOOD PRESSURE: 84 MMHG | WEIGHT: 293 LBS

## 2018-01-05 DIAGNOSIS — B35.1 ONYCHOMYCOSIS OF TOENAIL: Primary | ICD-10-CM

## 2018-01-05 DIAGNOSIS — S90.211A SUBUNGUAL HEMATOMA OF GREAT TOE OF RIGHT FOOT, INITIAL ENCOUNTER: ICD-10-CM

## 2018-01-05 PROCEDURE — G8484 FLU IMMUNIZE NO ADMIN: HCPCS | Performed by: FAMILY MEDICINE

## 2018-01-05 PROCEDURE — 1036F TOBACCO NON-USER: CPT | Performed by: FAMILY MEDICINE

## 2018-01-05 PROCEDURE — G8417 CALC BMI ABV UP PARAM F/U: HCPCS | Performed by: FAMILY MEDICINE

## 2018-01-05 PROCEDURE — 99214 OFFICE O/P EST MOD 30 MIN: CPT | Performed by: FAMILY MEDICINE

## 2018-01-05 PROCEDURE — G8427 DOCREV CUR MEDS BY ELIG CLIN: HCPCS | Performed by: FAMILY MEDICINE

## 2018-01-05 RX ORDER — OXYCODONE HYDROCHLORIDE AND ACETAMINOPHEN 5; 325 MG/1; MG/1
TABLET ORAL
COMMUNITY
Start: 2017-12-14 | End: 2018-02-01 | Stop reason: ALTCHOICE

## 2018-01-07 ASSESSMENT — ENCOUNTER SYMPTOMS
COLOR CHANGE: 1
DIARRHEA: 0
SHORTNESS OF BREATH: 0
GASTROINTESTINAL NEGATIVE: 1
VOMITING: 0

## 2018-01-08 ENCOUNTER — OFFICE VISIT (OUTPATIENT)
Dept: ORTHOPEDIC SURGERY | Age: 48
End: 2018-01-08

## 2018-01-08 VITALS
SYSTOLIC BLOOD PRESSURE: 138 MMHG | BODY MASS INDEX: 48.24 KG/M2 | HEART RATE: 68 BPM | DIASTOLIC BLOOD PRESSURE: 86 MMHG | WEIGHT: 293 LBS

## 2018-01-08 DIAGNOSIS — M17.12 PRIMARY OSTEOARTHRITIS OF LEFT KNEE: Chronic | ICD-10-CM

## 2018-01-08 DIAGNOSIS — M25.562 PAIN IN JOINT OF LEFT KNEE: Primary | ICD-10-CM

## 2018-01-08 PROCEDURE — G8427 DOCREV CUR MEDS BY ELIG CLIN: HCPCS | Performed by: ORTHOPAEDIC SURGERY

## 2018-01-08 PROCEDURE — G8484 FLU IMMUNIZE NO ADMIN: HCPCS | Performed by: ORTHOPAEDIC SURGERY

## 2018-01-08 PROCEDURE — 99213 OFFICE O/P EST LOW 20 MIN: CPT | Performed by: ORTHOPAEDIC SURGERY

## 2018-01-08 PROCEDURE — G8417 CALC BMI ABV UP PARAM F/U: HCPCS | Performed by: ORTHOPAEDIC SURGERY

## 2018-01-08 PROCEDURE — 1036F TOBACCO NON-USER: CPT | Performed by: ORTHOPAEDIC SURGERY

## 2018-01-08 NOTE — PROGRESS NOTES
Constitutional: Patient is adequately groomed with no evidence of malnutrition  Mental Status: The patient is oriented to time, place and person. The patient's mood and affect are appropriate. Lymphatic: The lymphatic examination bilaterally reveals all areas to be without enlargement or induration. Knee Examination:    Inspection:  Genu valgum left knee    Palpation:  She has moderate lateral joint line tenderness without Patrick sign. Ligamentous stability good in all directions. Patellofemoral crepitus grade 2-3 with moderate peripatellar and retropatellar tenderness. No major medial tenderness. Range of Motion:  She lacks about 3-5° of full extension and flexes her knee to about 115°    Strength:  Quadriceps strength reduced to 3/5 left knee    Special Tests:  Negative Homans sign left    Skin: There are no rashes, ulcerations or lesions. Gait: Antalgic gait left    Reflex intact    Additional Comments:       Additional Examinations:         Right Lower Extremity: Examination of the right lower extremity does not show any tenderness, deformity or injury. Range of motion is unremarkable. There is no gross instability. There are no rashes, ulcerations or lesions. Strength and tone are normal.  Left Lower Extremity: Examination of the left lower extremity does not show any tenderness, deformity or injury. Range of motion is unremarkable. There is no gross instability. There are no rashes, ulcerations or lesions. Strength and tone are normal.    Radiology:     X-rays obtained and reviewed in office:  Views standing AP, PA, lateral and sunrise  Location left knee  Impression advanced patellofemoral primary osteoarthritis with significant narrowing lateral compartment         Assessment :  Advanced primary osteoarthritis affecting lateral and patellofemoral compartments left knee    Impression:  Encounter Diagnosis   Name Primary?     Pain in joint of left knee Yes       Office

## 2018-01-08 NOTE — PROGRESS NOTES
hematoma, onychomycosis. The nail is  from the nailbed anteriorly. The area under the nail that communicates with this looks white   Skin: No rash noted. She is not diaphoretic. Nursing note and vitals reviewed. Assessment:      onychomycosis and subungual hematoma      Plan:      I suspect that the nail separation that induced the bleeding is from nail thickening from the fungal infection. Discussed the various options for treatment  (topical tx's, po Lamisil, laser therapy)  pt has elected Penlac. Amadeo Davenport was seen today for toe pain. Diagnoses and all orders for this visit:    Onychomycosis of toenail    Subungual hematoma of great toe of right foot, initial encounter    Other orders  -     ciclopirox (PENLAC) 8 % solution; Apply topically nightly.

## 2018-01-10 ENCOUNTER — OFFICE VISIT (OUTPATIENT)
Dept: ORTHOPEDIC SURGERY | Age: 48
End: 2018-01-10

## 2018-01-10 ENCOUNTER — OFFICE VISIT (OUTPATIENT)
Dept: BARIATRICS/WEIGHT MGMT | Age: 48
End: 2018-01-10

## 2018-01-10 VITALS
BODY MASS INDEX: 45.99 KG/M2 | WEIGHT: 293 LBS | RESPIRATION RATE: 16 BRPM | DIASTOLIC BLOOD PRESSURE: 70 MMHG | SYSTOLIC BLOOD PRESSURE: 110 MMHG | HEIGHT: 67 IN | HEART RATE: 76 BPM

## 2018-01-10 VITALS
WEIGHT: 293 LBS | HEART RATE: 89 BPM | DIASTOLIC BLOOD PRESSURE: 67 MMHG | SYSTOLIC BLOOD PRESSURE: 125 MMHG | BODY MASS INDEX: 45.99 KG/M2 | HEIGHT: 67 IN

## 2018-01-10 DIAGNOSIS — Z98.84 S/P LAPAROSCOPIC SLEEVE GASTRECTOMY: Primary | ICD-10-CM

## 2018-01-10 DIAGNOSIS — I10 HYPERTENSION, ESSENTIAL: ICD-10-CM

## 2018-01-10 DIAGNOSIS — M17.12 PRIMARY OSTEOARTHRITIS OF LEFT KNEE: Primary | Chronic | ICD-10-CM

## 2018-01-10 DIAGNOSIS — K76.0 FATTY LIVER: ICD-10-CM

## 2018-01-10 DIAGNOSIS — E66.01 MORBID OBESITY WITH BMI OF 45.0-49.9, ADULT (HCC): ICD-10-CM

## 2018-01-10 DIAGNOSIS — E78.2 MIXED HYPERLIPIDEMIA: ICD-10-CM

## 2018-01-10 PROCEDURE — G8484 FLU IMMUNIZE NO ADMIN: HCPCS | Performed by: ORTHOPAEDIC SURGERY

## 2018-01-10 PROCEDURE — 20611 DRAIN/INJ JOINT/BURSA W/US: CPT | Performed by: ORTHOPAEDIC SURGERY

## 2018-01-10 PROCEDURE — 99214 OFFICE O/P EST MOD 30 MIN: CPT | Performed by: ORTHOPAEDIC SURGERY

## 2018-01-10 PROCEDURE — 1036F TOBACCO NON-USER: CPT | Performed by: ORTHOPAEDIC SURGERY

## 2018-01-10 PROCEDURE — G8417 CALC BMI ABV UP PARAM F/U: HCPCS | Performed by: ORTHOPAEDIC SURGERY

## 2018-01-10 PROCEDURE — G8427 DOCREV CUR MEDS BY ELIG CLIN: HCPCS | Performed by: ORTHOPAEDIC SURGERY

## 2018-01-10 PROCEDURE — 99024 POSTOP FOLLOW-UP VISIT: CPT | Performed by: NURSE PRACTITIONER

## 2018-01-10 ASSESSMENT — ENCOUNTER SYMPTOMS
ALLERGIC/IMMUNOLOGIC NEGATIVE: 1
RESPIRATORY NEGATIVE: 1
EYES NEGATIVE: 1
GASTROINTESTINAL NEGATIVE: 1
ROS SKIN COMMENTS: SURGICAL INCISIONS.

## 2018-01-10 NOTE — PROGRESS NOTES
mild, echo 2016    Hypoglycemia     checks BS daily    Lupus anticoagulant disorder (HCC)     Narcolepsy     PE (pulmonary embolism)     Pneumonia     Pulmonary edema 03/2016    Rheumatoid arthritis ([facility] [address]     Sleep apnea     CPAP      Past Surgical History:     Past Surgical History:   Procedure Laterality Date    CHOLECYSTECTOMY, LAPAROSCOPIC      FOOT SURGERY      bone spur  - rt foot    HYSTEROSCOPY  05/25/2017    D & C    KNEE ARTHROSCOPY  2006    LEFT    KNEE ARTHROSCOPY Left 09/09/2016    SLEEVE GASTRECTOMY  11/29/2017    LAPAROSCOPIC SLEEVE GASTRECTOMY-ETHICON, LIVER BIOPSY    UMBILICAL HERNIA REPAIR  11/05/2012    LAPAROSCOPIC WITH PHYSIO MESH     Current Medications:     Current Outpatient Prescriptions:     oxyCODONE-acetaminophen (PERCOCET) 5-325 MG per tablet, , Disp: , Rfl:     ciclopirox (PENLAC) 8 % solution, Apply topically nightly., Disp: 1 Bottle, Rfl: 11    ferrous sulfate 325 (65 Fe) MG tablet, TAKE 1 TABLET BY MOUTH DAILY WITH SUPPER, Disp: 90 tablet, Rfl: 0    omeprazole (PRILOSEC) 20 MG delayed release capsule, Take 1 capsule by mouth Daily, Disp: 30 capsule, Rfl: 1    ondansetron (ZOFRAN ODT) 4 MG disintegrating tablet, Take 2 tablets by mouth every 8 hours as needed for Nausea, Disp: 30 tablet, Rfl: 1    buPROPion (WELLBUTRIN XL) 300 MG extended release tablet, TAKE (1) TABLET BY MOUTH ONCE DAILY, Disp: 30 tablet, Rfl: 11    citalopram (CELEXA) 40 MG tablet, TAKE (1) TABLET BY MOUTH ONCE DAILY, Disp: 30 tablet, Rfl: 11    rivaroxaban (XARELTO) 20 MG TABS tablet, TAKE 1 TABLET BY MOUTH EVERY 24 HOURS, Disp: 30 tablet, Rfl: 3    losartan-hydrochlorothiazide (HYZAAR) 50-12.5 MG per tablet, Take 1 tablet by mouth daily, Disp: 90 tablet, Rfl: 1    dextroamphetamine (DEXTROSTAT) 10 MG tablet, Take 10 mg by mouth 2 times daily . , Disp: , Rfl:     amphetamine-dextroamphetamine (ADDERALL XR) 30 MG extended release capsule, Take 30 mg by mouth 2 times daily . , Disp: , Rfl:   valACYclovir (VALTREX) 1 G tablet, TAKE 2 TABLETS BY MOUTH EVERY 12 HOURS FOR 2 DAYS, Disp: 8 tablet, Rfl: 1    albuterol sulfate HFA (PROAIR HFA) 108 (90 BASE) MCG/ACT inhaler, Inhale 2 puffs into the lungs every 6 hours as needed for Wheezing, Disp: 1 Inhaler, Rfl: 0    furosemide (LASIX) 40 MG tablet, Take 1 tablet by mouth daily (Patient taking differently: Take 40 mg by mouth as needed ), Disp: 60 tablet, Rfl: 1  Allergies:  Rocephin [ceftriaxone sodium in dextrose] and Lisinopril  Social History:    reports that she has never smoked. She has never used smokeless tobacco. She reports that she does not drink alcohol or use drugs. Family History:   Family History   Problem Relation Age of Onset    Adopted: Yes       REVIEW OF SYSTEMS:   For new problems, a full review of systems will be found scanned in the patient's chart. CONSTITUTIONAL: Denies unexplained weight loss, fevers, chills   NEUROLOGICAL: Denies unsteady gait or progressive weakness  SKIN: Denies skin changes, delayed healing, rash, itching       PHYSICAL EXAM:    Vitals: Blood pressure 125/67, pulse 89, height 5' 7.01\" (1.702 m), weight (!) 309 lb 1.4 oz (140.2 kg), not currently breastfeeding. GENERAL EXAM:  · General Apparence: Patient is adequately groomed with no evidence of malnutrition. · Orientation: The patient is oriented to time, place and person. · Mood & Affect:The patient's mood and affect are appropriate       LEFT knee PHYSICAL EXAMINATION:  · Inspection:  1+ effusion. Moderate valgus alignment. No ecchymosis or rashes or skin abnormalities. · Palpation:  Tender along the lateral joint space. Minimal patellofemoral disease. · Range of Motion: 0140 degrees. · Strength: No focal motor weakness    · Special Tests:  ACL MCL PCL LCL are all intact. This includes careful examination of the MCL is 0-30 and 60° of flexion. · Skin:  There are no rashes, ulcerations or lesions.     · Gait & station: ultrasound unit with a variable frequency (6.0-15.0 MHz) linear transducer was used to localize the placement of a 22-gauge needle into the knee joint. Findings: Successful needle placement for knee injection. Final images were taken and saved for permanent record. The patient tolerated the injection well. The patient was instructed to call the office immediately if there is any pain, redness, warmth, fever, or chills. I have personally performed and/or participated in the history, exam and medical decision making and agree with all pertinent clinical information. I have also reviewed and agree with the past medical, family and social history unless otherwise noted. This dictation was performed with a verbal recognition program (DRAGON) and it was checked for errors. It is possible that there are still dictated errors within this office note. If so, please bring any errors to my attention for an addendum. All efforts were made to ensure that this office note is accurate.           Chuckie Pierre MD

## 2018-01-12 ENCOUNTER — HOSPITAL ENCOUNTER (OUTPATIENT)
Dept: PHYSICAL THERAPY | Age: 48
Discharge: HOME | End: 2018-01-31
Admitting: ORTHOPAEDIC SURGERY

## 2018-01-12 NOTE — FLOWSHEET NOTE
[facility], [address] [place] [address]  Phone: [phone]  Fax [phone]    Physical Therapy Daily Treatment Note  Date:  2018    Patient Name:  Dafne Harris    :  1970  MRN: [id number]  Restrictions/Precautions:    Physician Information:  Referring Practitioner: Dr. Haroldo Montero  Medical/Treatment Diagnosis Information:  · Diagnosis: Left knee pain  M25.562,   Left knee OA  M17.12  · Treatment Diagnosis: Left knee pain  M25.562,  [x] Conservative / [] Surgical - DOS:  Therapy Diagnosis/Practice Pattern:  Practice Pattern E: Localized Inflammation  Insurance/Certification information:  PT Insurance Information: [place] of care signed: [] YES  [] NO  Number of Comorbidities:  []0     []1-2    [x]3+  Date of Patient follow up with Physician:     G-Code (if applicable):      Date G-Code Applied:   18  PT G-Codes  Functional Assessment Tool Used: LEFS  Score: 83%  Functional Limitation: Mobility: Walking and moving around  Mobility: Walking and Moving Around Current Status (): At least 80 percent but less than 100 percent impaired, limited or restricted  Mobility: Walking and Moving Around Goal Status (): At least 20 percent but less than 40 percent impaired, limited or restricted    Progress Note: [x]  Yes  []  No  Next due by: Visit #10        Latex Allergy:  [x]NO      []YES  Preferred Language for Healthcare:   [x]English       []other:    Visit # Insurance Allowable Reporting Period   1  Begin Date: 2018               End Date:      RECERT DUE BY:     SUBJECTIVE:  See eval    OBJECTIVE: See eval  Observation:  Palpation:  Pt is participating in [employer] weight loss program to reduce BMI for TKR.      Test used Initial score Current Score   Pain Summary VAS 2-8    Functional questionnaire LEFS 83    ROM flexion 100     extension 0    Strength Quad Fair     SLR indp with lag skill, proprioception of core, proximal hip and LE for self care, mobility, lifting, and ambulation/stair navigation      Manual Treatments:  PROM / STM / Oscillations-Mobs:  G-I, II, III, IV (PA's, Inf., Post.)  [x] (93413) Provided manual therapy to mobilize LE, proximal hip and/or LS spine soft tissue/joints for the purpose of modulating pain, promoting relaxation,  increasing ROM, reducing/eliminating soft tissue swelling/inflammation/restriction, improving soft tissue extensibility and allowing for proper ROM for normal function with self care, mobility, lifting and ambulation. Modalities:  Vaso  x15 min    Charges:  Timed Code Treatment Minutes: 30   Total Treatment Minutes: 60     [x] EVAL (LOW) 02544 (typically 20 minutes face-to-face)  [] EVAL (MOD) 73214 (typically 30 minutes face-to-face)  [] EVAL (HIGH) 25675 (typically 45 minutes face-to-face)  [] RE-EVAL     [x] IB(16618) x  2   [] IONTO  [] NMR (91378) x      [x] VASO  [] Manual (42375) x       [] Other:  [] TA x       [] Mech Traction (05225)  [] ES(attended) (61231)      [] ES (un) (68490):     GOALS:   Patient stated goal: Prepare for knee surgery. Therapist goals for Patient:   Short Term Goals: To be achieved in: 2 weeks  1. Independent in HEP and progression per patient tolerance, in order to prevent re-injury. 2. Patient will have a decrease in pain to facilitate improvement in movement, function, and ADLs as indicated by Functional Deficits. Long Term Goals: To be achieved in: 8 weeks  1. Disability index score of 40% or less for the LEFS to assist with reaching prior level of function. 2. Patient will demonstrate increased AROM of left knee from 0 - 120  to allow for proper joint functioning as indicated by patients Functional Deficits.    3. Patient will demonstrate an increase in Strength to good proximal hip strength and control, within 5lb HHD in LE to allow for proper functional mobility as indicated by patients Functional Deficits. 4. Patient will return to ascending and descending stairs with reciprocal gait without increased symptoms or restriction. 5. Prepare for TKR. New or Updated Goals (if applicable):  [x] No change to goals established upon initial eval/last progress note:  New Goals:    Progression Towards Functional goals:   [] Patient is progressing as expected towards functional goals listed. [] Progression is slowed due to complexities listed. [] Progression has been slowed due to co-morbidities.   [x] Plan just implemented, too soon to assess goals progression  [] Other:     ASSESSMENT:    [] Improvement noted relative to goals:  [] No Improvement noted related to goals:  Summary/Patient's response to treatment: See Eval    Treatment/Activity Tolerance:  [x] Patient tolerated treatment well [] Patient limited by fatique  [] Patient limited by pain  [] Patient limited by other medical complications  [] Other:     Prognosis: [x] Good [] Fair  [] Poor    Patient Requires Follow-up: [x] Yes  [] No    PLAN: See eval  [] Continue per plan of care [] Alter current plan (see comments)  [x] Plan of care initiated [] Hold pending MD visit [] Discharge    Electronically signed by: Fred Blankenship, PT

## 2018-01-12 NOTE — PLAN OF CARE
[facility], [address] [place] [address], [address] [place] [address]  Phone: [phone]  Fax [phone]     Physical Therapy Certification    Dear Referring Practitioner: Dr. Estefany Metzger,    We had the pleasure of evaluating the following patient for physical therapy services at [address] [place] [address]. A summary of our findings can be found in the initial assessment below. This includes our plan of care. If you have any questions or concerns regarding these findings, please do not hesitate to contact me at the office phone number checked above. Thank you for the referral.       Physician Signature:_______________________________Date:__________________  By signing above (or electronic signature), therapists plan is approved by physician    Patient: Baldemar Marie   : 1970   MRN: [id number]  Referring Physician: Referring Practitioner: Dr. Estefany Metgzer      Evaluation Date: 2018      Medical Diagnosis Information:  Diagnosis: Left knee pain  M25.562,   Left knee OA  M17.12   Treatment Diagnosis: Left knee pain  M25.562,                                         Insurance information: PT Insurance Information: [employer]     Precautions/ Contra-indications: meniscus - , 2016,  OA, RA    Latex Allergy:  [x]NO      []YES  Preferred Language for Healthcare:   [x]English       []other:    SUBJECTIVE: Patient stated complaint:Pt has had multiple surgeries, cortisone, Rupinderyasmin Petersen. Knee pain is just getting worse. Pt had gastroc sleeve surgery in 2017. Pt has lost 50 lbs since. Pt has a cane, but she is not using it. Pt does not have a knee brace. Pt uses step to gait on the stairs. Back wakes her from sleep more often than knees. Pt's right knee is in good shape. Relevant Medical History:see above.     Functional Disability Index:PT G-Codes  Functional Assessment Tool Used: LEFS  Score: 83%  Functional Limitation:

## 2018-01-17 RX ORDER — RIVAROXABAN 20 MG/1
TABLET, FILM COATED ORAL
Qty: 30 TABLET | Refills: 0 | Status: SHIPPED | OUTPATIENT
Start: 2018-01-17 | End: 2018-02-21 | Stop reason: SDUPTHER

## 2018-02-01 ENCOUNTER — OFFICE VISIT (OUTPATIENT)
Dept: BARIATRICS/WEIGHT MGMT | Age: 48
End: 2018-02-01

## 2018-02-01 ENCOUNTER — HOSPITAL ENCOUNTER (OUTPATIENT)
Dept: PHYSICAL THERAPY | Age: 48
Discharge: HOME | End: 2018-02-28
Attending: ORTHOPAEDIC SURGERY | Admitting: ORTHOPAEDIC SURGERY

## 2018-02-01 VITALS
HEART RATE: 67 BPM | HEIGHT: 67 IN | BODY MASS INDEX: 45.99 KG/M2 | WEIGHT: 293 LBS | DIASTOLIC BLOOD PRESSURE: 76 MMHG | SYSTOLIC BLOOD PRESSURE: 119 MMHG

## 2018-02-01 DIAGNOSIS — I10 HYPERTENSION, ESSENTIAL: ICD-10-CM

## 2018-02-01 DIAGNOSIS — G47.33 SLEEP APNEA, OBSTRUCTIVE: ICD-10-CM

## 2018-02-01 DIAGNOSIS — E55.9 VITAMIN D DEFICIENCY: ICD-10-CM

## 2018-02-01 DIAGNOSIS — K76.0 FATTY LIVER: ICD-10-CM

## 2018-02-01 DIAGNOSIS — Z98.84 S/P LAPAROSCOPIC SLEEVE GASTRECTOMY: Primary | ICD-10-CM

## 2018-02-01 DIAGNOSIS — E66.01 MORBID OBESITY WITH BMI OF 45.0-49.9, ADULT (HCC): ICD-10-CM

## 2018-02-01 PROCEDURE — 99024 POSTOP FOLLOW-UP VISIT: CPT | Performed by: NURSE PRACTITIONER

## 2018-02-01 RX ORDER — BUPRENORPHINE 15 UG/H
PATCH TRANSDERMAL
COMMUNITY
Start: 2018-01-17 | End: 2018-08-07

## 2018-02-01 ASSESSMENT — ENCOUNTER SYMPTOMS
EYES NEGATIVE: 1
RESPIRATORY NEGATIVE: 1
GASTROINTESTINAL NEGATIVE: 1

## 2018-02-01 NOTE — Clinical Note
Patient had weight gain at 9 week follow up appt. She is coming back in 3 weeks for continued follow up.

## 2018-02-22 ENCOUNTER — TELEPHONE (OUTPATIENT)
Dept: OTHER | Facility: CLINIC | Age: 48
End: 2018-02-22

## 2018-02-22 NOTE — TELEPHONE ENCOUNTER
Phil Massey enrolled as a member of the [employer] [facility].      If your patient has a specific chronic illness or acute diagnosis which may be positively influenced by our medical fitness facility and expertise, please route a telephone encounter or staff message to:     [address] [place] [address] Drive

## 2018-03-08 RX ORDER — ONDANSETRON 4 MG/1
8 TABLET, ORALLY DISINTEGRATING ORAL EVERY 8 HOURS PRN
Qty: 30 TABLET | Refills: 1 | Status: SHIPPED | OUTPATIENT
Start: 2018-03-08

## 2018-03-21 RX ORDER — LOSARTAN POTASSIUM AND HYDROCHLOROTHIAZIDE 12.5; 5 MG/1; MG/1
1 TABLET ORAL DAILY
Qty: 30 TABLET | Refills: 0 | Status: SHIPPED | OUTPATIENT
Start: 2018-03-21 | End: 2019-06-19 | Stop reason: SDDI

## 2018-03-22 ENCOUNTER — OFFICE VISIT (OUTPATIENT)
Dept: BARIATRICS/WEIGHT MGMT | Age: 48
End: 2018-03-22

## 2018-03-22 ENCOUNTER — OFFICE VISIT (OUTPATIENT)
Dept: FAMILY MEDICINE CLINIC | Age: 48
End: 2018-03-22

## 2018-03-22 VITALS
WEIGHT: 293 LBS | TEMPERATURE: 98 F | BODY MASS INDEX: 45.99 KG/M2 | HEIGHT: 67 IN | HEART RATE: 79 BPM | DIASTOLIC BLOOD PRESSURE: 75 MMHG | SYSTOLIC BLOOD PRESSURE: 110 MMHG

## 2018-03-22 VITALS
HEART RATE: 89 BPM | HEIGHT: 67 IN | WEIGHT: 293 LBS | SYSTOLIC BLOOD PRESSURE: 116 MMHG | BODY MASS INDEX: 45.99 KG/M2 | DIASTOLIC BLOOD PRESSURE: 77 MMHG

## 2018-03-22 DIAGNOSIS — K52.9 ACUTE GASTROENTERITIS: ICD-10-CM

## 2018-03-22 DIAGNOSIS — E66.01 MORBID OBESITY WITH BMI OF 45.0-49.9, ADULT (HCC): ICD-10-CM

## 2018-03-22 DIAGNOSIS — E55.9 VITAMIN D DEFICIENCY: ICD-10-CM

## 2018-03-22 DIAGNOSIS — J02.9 ACUTE VIRAL PHARYNGITIS: Primary | ICD-10-CM

## 2018-03-22 DIAGNOSIS — G47.33 SLEEP APNEA, OBSTRUCTIVE: ICD-10-CM

## 2018-03-22 DIAGNOSIS — E61.1 IRON DEFICIENCY: ICD-10-CM

## 2018-03-22 DIAGNOSIS — K76.0 FATTY LIVER: ICD-10-CM

## 2018-03-22 DIAGNOSIS — E78.2 MIXED HYPERLIPIDEMIA: ICD-10-CM

## 2018-03-22 DIAGNOSIS — Z98.84 S/P LAPAROSCOPIC SLEEVE GASTRECTOMY: Primary | ICD-10-CM

## 2018-03-22 LAB — S PYO AG THROAT QL: NORMAL

## 2018-03-22 PROCEDURE — 99213 OFFICE O/P EST LOW 20 MIN: CPT | Performed by: NURSE PRACTITIONER

## 2018-03-22 PROCEDURE — G8427 DOCREV CUR MEDS BY ELIG CLIN: HCPCS | Performed by: NURSE PRACTITIONER

## 2018-03-22 PROCEDURE — 1036F TOBACCO NON-USER: CPT | Performed by: NURSE PRACTITIONER

## 2018-03-22 PROCEDURE — G8482 FLU IMMUNIZE ORDER/ADMIN: HCPCS | Performed by: NURSE PRACTITIONER

## 2018-03-22 PROCEDURE — 87880 STREP A ASSAY W/OPTIC: CPT | Performed by: NURSE PRACTITIONER

## 2018-03-22 PROCEDURE — G8417 CALC BMI ABV UP PARAM F/U: HCPCS | Performed by: NURSE PRACTITIONER

## 2018-03-22 ASSESSMENT — ENCOUNTER SYMPTOMS
TROUBLE SWALLOWING: 0
DIARRHEA: 1
GASTROINTESTINAL NEGATIVE: 1
COUGH: 0
RESPIRATORY NEGATIVE: 1
SORE THROAT: 1
RESPIRATORY NEGATIVE: 1
EYES NEGATIVE: 1
SHORTNESS OF BREATH: 0
NAUSEA: 1
BLOOD IN STOOL: 0
CHEST TIGHTNESS: 0
ABDOMINAL PAIN: 1
SINUS PRESSURE: 0
SINUS PAIN: 0

## 2018-03-22 NOTE — PROGRESS NOTES
not smoking and risks of ETOH. Allergies   Allergen Reactions    Rocephin [Ceftriaxone Sodium In Dextrose] Hives     ITCH    Lisinopril Other (See Comments)     coughing     Vitals:    03/22/18 0931   BP: 116/77   Pulse: 89   Weight: (!) 305 lb (138.3 kg)   Height: 5' 7\" (1.702 m)       Body mass index is 47.77 kg/m². Current Outpatient Prescriptions:     losartan-hydrochlorothiazide (HYZAAR) 50-12.5 MG per tablet, TAKE 1 TABLET BY MOUTH DAILY, Disp: 30 tablet, Rfl: 0    ondansetron (ZOFRAN ODT) 4 MG disintegrating tablet, Take 2 tablets by mouth every 8 hours as needed for Nausea, Disp: 30 tablet, Rfl: 1    rivaroxaban (XARELTO) 20 MG TABS tablet, TAKE 1 TABLET BY MOUTH EVERY 24 HOURS, Disp: 30 tablet, Rfl: 2    buprenorphine (BUPRENEX) 15 MCG/HR PTWK, , Disp: , Rfl:     ciclopirox (PENLAC) 8 % solution, Apply topically nightly., Disp: 1 Bottle, Rfl: 11    omeprazole (PRILOSEC) 20 MG delayed release capsule, Take 1 capsule by mouth Daily, Disp: 30 capsule, Rfl: 1    buPROPion (WELLBUTRIN XL) 300 MG extended release tablet, TAKE (1) TABLET BY MOUTH ONCE DAILY, Disp: 30 tablet, Rfl: 11    citalopram (CELEXA) 40 MG tablet, TAKE (1) TABLET BY MOUTH ONCE DAILY, Disp: 30 tablet, Rfl: 11    dextroamphetamine (DEXTROSTAT) 10 MG tablet, Take 10 mg by mouth 2 times daily . , Disp: , Rfl:     amphetamine-dextroamphetamine (ADDERALL XR) 30 MG extended release capsule, Take 30 mg by mouth 2 times daily . , Disp: , Rfl:     valACYclovir (VALTREX) 1 G tablet, TAKE 2 TABLETS BY MOUTH EVERY 12 HOURS FOR 2 DAYS, Disp: 8 tablet, Rfl: 1    albuterol sulfate HFA (PROAIR HFA) 108 (90 BASE) MCG/ACT inhaler, Inhale 2 puffs into the lungs every 6 hours as needed for Wheezing, Disp: 1 Inhaler, Rfl: 0    furosemide (LASIX) 40 MG tablet, Take 1 tablet by mouth daily (Patient taking differently: Take 40 mg by mouth as needed ), Disp: 60 tablet, Rfl: 1    ferrous sulfate 325 (65 Fe) MG tablet, TAKE 1 TABLET BY MOUTH DAILY WITH SUPPER, Disp: 90 tablet, Rfl: 0        Review of Systems   Constitutional: Negative. HENT: Negative. Eyes: Negative. Respiratory: Negative. Cardiovascular: Positive for leg swelling. Flare-up of lymphedema   Gastrointestinal: Negative. Skin: Negative. Neurological: Negative. Objective:   Physical Exam   Constitutional: She is oriented to person, place, and time. She appears well-developed and well-nourished. HENT:   Head: Normocephalic and atraumatic. Eyes: Pupils are equal, round, and reactive to light. Neck: Normal range of motion. Cardiovascular: Normal rate. Pulmonary/Chest: Effort normal.   Abdominal: Soft. There is no tenderness. Musculoskeletal: Normal range of motion. Neurological: She is alert and oriented to person, place, and time. Skin:   Well healed surgical incisions to abdomen   Psychiatric: She has a normal mood and affect. Her behavior is normal. Judgment and thought content normal.         Assessment and Plan:   Patient is 16 weeks s/p sleeve gastrectomy, down 5 lbs with a total weight loss of 56.5 lbs. The patient's current Body mass index is 47.77 kg/m². (3/22/18). She is doing well, denies n/v/dysphagia or reflux. She  is tolerating diet, getting adequate fluids. She does need to avoid sugary liquids. She is low on protein, is noting hair loss, re-iterated protein goals. She  is not exercising. Encouraged continued physical activity as she is able. She  is taking vitamins as instructed. She  did meet with the registered dietitian for continued follow up. I agree with recommendations and plan. We will see her back in 2 months for continued follow up. I have spent 15 min counseling patient.

## 2018-03-22 NOTE — PATIENT INSTRUCTIONS
Diet tips to help make you successful postoperatively  Eating habits after surgery will have to be a permanent and long-term change. Eating habits are so ingrained that it can be difficult to change. It is important to maintain these new eating habits after surgery. Also remember that overall health, age, and genetics make each persons weight loss progress different. Do not compare your progress, the amount you eat, or exercise to other patients.  Protein first at every meal- Eat the protein portion of your meal first. Eating protein helps the body feel full and sends a signal to stop eating. Protein is very important in building tissue in the body.  Eat at least 4 times per day- This includes protein supplements and small meals with a high amount of protein   Chewing your food thoroughly- Eating too quickly and improper chewing can cause pain and vomiting after surgery.  Slowing down the speed at which you eat- Refill your fork only after you swallow. Adopt a new pattern of eating by taking a bite of food and putting your utensil down between bites. This will help to reduce the feeling of food being stuck.    Drink water and start drinking fluids slowly- Drink at least 48 ounces per day minimum. Sip fluids as if they were hot beverages. If you find it difficult to stop gulping liquids, try using a sippy cup or a sport top water bottle.  Make sure you are eating meals without drinking fluids- After surgery you will not be allowed to drink fluids 30 minutes before, during, or 30 minutes after your meal (30/30/30 rule). This will be a life-long behavior change. The reason for the rule is to keep food from passing through your smaller stomach more rapidly. This will cause you to feel hungry shortly after your meal.   Continue to avoid caffeine and carbonated beverages- Caffeine acts as a diuretic and can be dehydrating as well as irritating to the lining of the stomach.  Carbonated beverages release gas and can expand the stomach.  Continue to keep temptation from your kitchen- Keep your pantry and kitchen cabinets cleaned out of those dangerous foods that might tempt you after surgery (chips, cookies, candy, etc.).  Continue to increase your exercise program- Increase your daily physical activity. Aim for 5-6 days per week for 30 minutes. Walking is an easy way to get started with exercising. Exercise is going to be a regular part of your life after surgery.  Make sure you have a good support system- There will be many changes and adjustments to make after surgery. It is important to have a supportive friend, family member or co-worker, etc. with whom you can talk. Continue to attend [facility]) Weight Management support groups as they can be helpful in maintaining behaviors. In addition, it is the responsibility of the patient to schedule and follow up on labs and tests completed after surgery. Results will be reviewed at each visit.

## 2018-03-22 NOTE — PROGRESS NOTES
Subjective:      Patient ID: Geovanna Booker is a 49 y.o. female. HPI Patient presents with complaints of sore throat and spots on tongue. Patient states the spots were visible yesterday but gone today. Sore throat began 3-4 days ago. She has not taken any medications for sore throat, she has just tried to drink tea. Patient also complains of nausea and diarrhea. She has had 2 episodes of diarrhea today. Denies vomiting. Patient has not been around anyone that has been sick. She has 3 children, the oldest is 24. Two children live at home with patient. Review of Systems   Constitutional: Positive for fatigue (pt states she is chronically fatigued). Negative for activity change, appetite change, chills and fever. HENT: Positive for postnasal drip and sore throat. Negative for congestion, ear pain, sinus pain, sinus pressure, tinnitus and trouble swallowing. Respiratory: Negative. Negative for cough, chest tightness and shortness of breath. Cardiovascular: Negative. Negative for chest pain and palpitations. Gastrointestinal: Positive for abdominal pain, diarrhea (2 episodes today) and nausea. Negative for blood in stool. Genitourinary: Negative. Neurological: Negative. Psychiatric/Behavioral: Negative. Objective:   Physical Exam   Constitutional: She is oriented to person, place, and time. She appears well-developed and well-nourished. HENT:   Right Ear: Hearing, tympanic membrane, external ear and ear canal normal.   Left Ear: Hearing, tympanic membrane, external ear and ear canal normal.   Mouth/Throat: Uvula is midline. Redness in throat. White spots on tip of tongue. Cardiovascular: Normal rate, regular rhythm and normal heart sounds. No murmur heard. Pulmonary/Chest: Effort normal and breath sounds normal. She has no wheezes. She has no rales. Abdominal: Soft. Bowel sounds are normal. There is no tenderness.    Neurological: She is alert and oriented to person,

## 2018-04-09 ENCOUNTER — TELEPHONE (OUTPATIENT)
Dept: ORTHOPEDIC SURGERY | Age: 48
End: 2018-04-09

## 2018-05-16 RX ORDER — RIVAROXABAN 20 MG/1
TABLET, FILM COATED ORAL
Qty: 30 TABLET | Refills: 0 | Status: SHIPPED | OUTPATIENT
Start: 2018-05-16 | End: 2018-06-13 | Stop reason: SDUPTHER

## 2018-05-24 ENCOUNTER — OFFICE VISIT (OUTPATIENT)
Dept: BARIATRICS/WEIGHT MGMT | Age: 48
End: 2018-05-24

## 2018-05-24 VITALS
HEART RATE: 74 BPM | HEIGHT: 67 IN | WEIGHT: 286 LBS | DIASTOLIC BLOOD PRESSURE: 63 MMHG | BODY MASS INDEX: 44.89 KG/M2 | SYSTOLIC BLOOD PRESSURE: 105 MMHG

## 2018-05-24 DIAGNOSIS — I10 HYPERTENSION, ESSENTIAL: ICD-10-CM

## 2018-05-24 DIAGNOSIS — K76.0 FATTY LIVER: ICD-10-CM

## 2018-05-24 DIAGNOSIS — Z98.84 S/P LAPAROSCOPIC SLEEVE GASTRECTOMY: Primary | ICD-10-CM

## 2018-05-24 DIAGNOSIS — E55.9 VITAMIN D DEFICIENCY: ICD-10-CM

## 2018-05-24 DIAGNOSIS — G47.33 SLEEP APNEA, OBSTRUCTIVE: ICD-10-CM

## 2018-05-24 DIAGNOSIS — E61.1 IRON DEFICIENCY: ICD-10-CM

## 2018-05-24 DIAGNOSIS — E78.2 MIXED HYPERLIPIDEMIA: ICD-10-CM

## 2018-05-24 DIAGNOSIS — E66.01 MORBID OBESITY WITH BMI OF 40.0-44.9, ADULT (HCC): ICD-10-CM

## 2018-05-24 PROCEDURE — G8427 DOCREV CUR MEDS BY ELIG CLIN: HCPCS | Performed by: NURSE PRACTITIONER

## 2018-05-24 PROCEDURE — 1036F TOBACCO NON-USER: CPT | Performed by: NURSE PRACTITIONER

## 2018-05-24 PROCEDURE — G8417 CALC BMI ABV UP PARAM F/U: HCPCS | Performed by: NURSE PRACTITIONER

## 2018-05-24 PROCEDURE — 99213 OFFICE O/P EST LOW 20 MIN: CPT | Performed by: NURSE PRACTITIONER

## 2018-05-24 RX ORDER — FENTANYL 25 UG/H
PATCH TRANSDERMAL
COMMUNITY
Start: 2018-05-19 | End: 2018-08-07

## 2018-05-24 ASSESSMENT — ENCOUNTER SYMPTOMS
EYES NEGATIVE: 1
GASTROINTESTINAL NEGATIVE: 1
RESPIRATORY NEGATIVE: 1

## 2018-06-04 ENCOUNTER — OFFICE VISIT (OUTPATIENT)
Dept: ORTHOPEDIC SURGERY | Age: 48
End: 2018-06-04

## 2018-06-04 VITALS
HEART RATE: 80 BPM | SYSTOLIC BLOOD PRESSURE: 113 MMHG | BODY MASS INDEX: 44.48 KG/M2 | HEIGHT: 67 IN | DIASTOLIC BLOOD PRESSURE: 71 MMHG | WEIGHT: 283.4 LBS

## 2018-06-04 DIAGNOSIS — M17.12 PRIMARY OSTEOARTHRITIS OF LEFT KNEE: Primary | Chronic | ICD-10-CM

## 2018-06-04 DIAGNOSIS — E66.01 MORBID OBESITY DUE TO EXCESS CALORIES (HCC): ICD-10-CM

## 2018-06-04 PROCEDURE — L1830 KO IMMOB CANVAS LONG PRE OTS: HCPCS | Performed by: ORTHOPAEDIC SURGERY

## 2018-06-04 PROCEDURE — 99214 OFFICE O/P EST MOD 30 MIN: CPT | Performed by: ORTHOPAEDIC SURGERY

## 2018-06-04 PROCEDURE — G8427 DOCREV CUR MEDS BY ELIG CLIN: HCPCS | Performed by: ORTHOPAEDIC SURGERY

## 2018-06-04 PROCEDURE — G8417 CALC BMI ABV UP PARAM F/U: HCPCS | Performed by: ORTHOPAEDIC SURGERY

## 2018-06-04 PROCEDURE — 1036F TOBACCO NON-USER: CPT | Performed by: ORTHOPAEDIC SURGERY

## 2018-06-04 RX ORDER — AMOXICILLIN 500 MG/1
CAPSULE ORAL
COMMUNITY
Start: 2018-05-31 | End: 2018-08-07

## 2018-06-04 RX ORDER — IBUPROFEN 800 MG/1
TABLET ORAL
COMMUNITY
Start: 2018-05-31 | End: 2018-06-14 | Stop reason: ALTCHOICE

## 2018-06-25 RX ORDER — VALACYCLOVIR HYDROCHLORIDE 1 G/1
TABLET, FILM COATED ORAL
Qty: 8 TABLET | Refills: 1 | Status: SHIPPED | OUTPATIENT
Start: 2018-06-25

## 2018-06-27 ENCOUNTER — HOSPITAL ENCOUNTER (OUTPATIENT)
Dept: PHYSICAL THERAPY | Age: 48
Discharge: HOME | End: 2018-06-30
Attending: PAIN MEDICINE | Admitting: PAIN MEDICINE

## 2018-07-01 ENCOUNTER — HOSPITAL ENCOUNTER (OUTPATIENT)
Dept: OTHER | Age: 48
Discharge: HOME | End: 2018-07-01
Attending: PAIN MEDICINE | Admitting: PAIN MEDICINE

## 2018-07-11 ENCOUNTER — TELEPHONE (OUTPATIENT)
Dept: CARDIOLOGY CLINIC | Age: 48
End: 2018-07-11

## 2018-07-17 RX ORDER — RIVAROXABAN 20 MG/1
TABLET, FILM COATED ORAL
Qty: 30 TABLET | Refills: 0 | Status: SHIPPED | OUTPATIENT
Start: 2018-07-17 | End: 2018-08-21 | Stop reason: SDUPTHER

## 2018-07-19 ENCOUNTER — HOSPITAL ENCOUNTER (OUTPATIENT)
Dept: PHYSICAL THERAPY | Age: 48
Setting detail: THERAPIES SERIES
Discharge: HOME | End: 2018-07-19
Payer: COMMERCIAL

## 2018-07-19 ENCOUNTER — TELEPHONE (OUTPATIENT)
Dept: ORTHOPEDIC SURGERY | Age: 48
End: 2018-07-19

## 2018-07-19 PROCEDURE — 97150 GROUP THERAPEUTIC PROCEDURES: CPT

## 2018-07-19 PROCEDURE — 97113 AQUATIC THERAPY/EXERCISES: CPT

## 2018-07-19 NOTE — TELEPHONE ENCOUNTER
ORTHOPAEDIC NURSE NAVIGATOR SUMMARY NOTE    Spoke with pt 8/1/18. Pt was unaware she needed to get cardiac clearance. Pt to call Dr. Jose Womack today to determine if she needs to be seen for clearance. Pt on xarelto managed by her PCP for history of PE in 2004. Pt to call PCP to determine when to stop xarelto. Pt also is known to hematology and has not seen them for over a year. RN emailed Dr. Orosco Eis office to determine whether pt needs clearance from them. Pt also has sleep apnea and does use c-pap. Has not been using C-pap because mask is cutting into her face. Recommended to pt to try and get new mask before surgery if possible, if not bring machine to hospital and possibly respiratory therapy could assist with padding to make the mask more comfortable. 8/1/18: RN heard back from Dr. Alisha Danielle, he does want pt to call or see hematology regarding what type of anticoagulation is recommended following surgery. RN called pt to update her. Pt verbalized understanding. Anticipated Date of Surgery: 8/14/18    Using [employer]? No, Are they Registered:  NA   If No, why not?    N/A    Attended Pre-Op Education Class: no   If No, why not?  8/7/18      PCP: Verónica Metzger MD   Phone #: None    Date of PCP Visit for H&P: 8/12/18    Any Noted Concerns from PCP prior to surgery:  No   If Yes, what concerns?:         Review of Past Medical History Reveals History of:      Critical Lab Values:   Hgb/Hct:   Hemoglobin (g/dL)   Date Value   11/30/2017 11.6 (L)   /  Hematocrit (%)   Date Value   11/30/2017 35.2 (L)      HgbA1C:    Lab Results   Component Value Date    LABA1C 5.2 03/16/2017    LABA1C 5.0 03/08/2015      Albumin:    Lab Results   Component Value Date    LABALBU 3.6 11/22/2017      BUN/Cr:   BUN (mg/dL)   Date Value   11/30/2017 11   /  CREATININE (mg/dL)   Date Value   11/30/2017 0.6      BMI:    BMI Readings from Last 1 Encounters:   06/04/18 44.38 kg/m²        Coronary Artery Disease/HTN/CHF History: to SNF for these reasons. RN explained to pt how PT evaluation at hospital played large role in discharge planning. Explained to pt to prepare for going home if insurance doesn't cover a SNF. Who will be with patient at home following discharge? Kids at home      Equipment pt already has:  Walker, crutches     Bedroom on first or second floor: Second   Bathroom on first or second floor: Second   Weight bearing status: Full   Pre-op ambulatory status: NONE   Number of entry steps: 10 into front door. Bi-level 6 steps to main level   Caregiver assistance: Full time if necessary.         Blanquita Temple  7/19/2018

## 2018-07-19 NOTE — FLOWSHEET NOTE
current plan (see comments)   [] Plan of care initiated [] Hold pending MD visit [] Discharge    See Weekly Progress Note: [] Yes  [x] No  Next due:

## 2018-07-24 ENCOUNTER — HOSPITAL ENCOUNTER (OUTPATIENT)
Dept: PHYSICAL THERAPY | Age: 48
Setting detail: THERAPIES SERIES
End: 2018-07-24
Payer: COMMERCIAL

## 2018-07-26 ENCOUNTER — APPOINTMENT (OUTPATIENT)
Dept: PHYSICAL THERAPY | Age: 48
End: 2018-07-26
Payer: COMMERCIAL

## 2018-07-31 ENCOUNTER — HOSPITAL ENCOUNTER (OUTPATIENT)
Dept: PHYSICAL THERAPY | Age: 48
Setting detail: THERAPIES SERIES
Discharge: HOME | End: 2018-07-31
Payer: COMMERCIAL

## 2018-07-31 ENCOUNTER — HOSPITAL ENCOUNTER (OUTPATIENT)
Dept: PREADMISSION TESTING | Age: 48
Discharge: HOME | End: 2018-08-04
Payer: COMMERCIAL

## 2018-07-31 LAB
ABO/RH: NORMAL
ALBUMIN SERPL-MCNC: 3.3 G/DL (ref 3.4–5)
ANION GAP SERPL CALCULATED.3IONS-SCNC: 11 MMOL/L (ref 3–16)
ANTIBODY SCREEN: NORMAL
APTT: 36.4 SEC (ref 26–36)
BACTERIA: ABNORMAL /HPF
BASOPHILS ABSOLUTE: 0 K/UL (ref 0–0.2)
BASOPHILS RELATIVE PERCENT: 0.7 %
BILIRUBIN URINE: ABNORMAL
BLOOD, URINE: NEGATIVE
BUN BLDV-MCNC: 9 MG/DL (ref 7–20)
CALCIUM SERPL-MCNC: 8.6 MG/DL (ref 8.3–10.6)
CHLORIDE BLD-SCNC: 104 MMOL/L (ref 99–110)
CLARITY: CLEAR
CO2: 27 MMOL/L (ref 21–32)
COLOR: YELLOW
CREAT SERPL-MCNC: 0.7 MG/DL (ref 0.6–1.1)
EKG ATRIAL RATE: 59 BPM
EKG DIAGNOSIS: NORMAL
EKG P AXIS: 65 DEGREES
EKG P-R INTERVAL: 158 MS
EKG Q-T INTERVAL: 442 MS
EKG QRS DURATION: 82 MS
EKG QTC CALCULATION (BAZETT): 437 MS
EKG R AXIS: 2 DEGREES
EKG T AXIS: 20 DEGREES
EKG VENTRICULAR RATE: 59 BPM
EOSINOPHILS ABSOLUTE: 0.1 K/UL (ref 0–0.6)
EOSINOPHILS RELATIVE PERCENT: 2.4 %
EPITHELIAL CELLS, UA: ABNORMAL /HPF
GFR AFRICAN AMERICAN: >60
GFR NON-AFRICAN AMERICAN: >60
GLUCOSE BLD-MCNC: 117 MG/DL (ref 70–99)
GLUCOSE URINE: NEGATIVE MG/DL
HCT VFR BLD CALC: 33.9 % (ref 36–48)
HEMOGLOBIN: 12 G/DL (ref 12–16)
INR BLD: 1.07 (ref 0.86–1.14)
KETONES, URINE: NEGATIVE MG/DL
LEUKOCYTE ESTERASE, URINE: ABNORMAL
LYMPHOCYTES ABSOLUTE: 1.8 K/UL (ref 1–5.1)
LYMPHOCYTES RELATIVE PERCENT: 33.9 %
MCH RBC QN AUTO: 28.5 PG (ref 26–34)
MCHC RBC AUTO-ENTMCNC: 35.3 G/DL (ref 31–36)
MCV RBC AUTO: 80.8 FL (ref 80–100)
MICROSCOPIC EXAMINATION: YES
MONOCYTES ABSOLUTE: 0.2 K/UL (ref 0–1.3)
MONOCYTES RELATIVE PERCENT: 4.4 %
MUCUS: ABNORMAL /LPF
NEUTROPHILS ABSOLUTE: 3.1 K/UL (ref 1.7–7.7)
NEUTROPHILS RELATIVE PERCENT: 58.6 %
NITRITE, URINE: NEGATIVE
PDW BLD-RTO: 14.4 % (ref 12.4–15.4)
PH UA: 6.5
PLATELET # BLD: 215 K/UL (ref 135–450)
PLATELET SLIDE REVIEW: ADEQUATE
PMV BLD AUTO: 7.9 FL (ref 5–10.5)
POTASSIUM SERPL-SCNC: 3.2 MMOL/L (ref 3.5–5.1)
PROTEIN UA: ABNORMAL MG/DL
PROTHROMBIN TIME: 12.2 SEC (ref 9.8–13)
RBC # BLD: 4.19 M/UL (ref 4–5.2)
RBC UA: ABNORMAL /HPF (ref 0–2)
SODIUM BLD-SCNC: 142 MMOL/L (ref 136–145)
SPECIFIC GRAVITY UA: 1.02
TRANSFERRIN: 213 MG/DL (ref 200–360)
URINE TYPE: ABNORMAL
UROBILINOGEN, URINE: >=8 E.U./DL
WBC # BLD: 5.3 K/UL (ref 4–11)
WBC UA: ABNORMAL /HPF (ref 0–5)

## 2018-07-31 PROCEDURE — 85025 COMPLETE CBC W/AUTO DIFF WBC: CPT

## 2018-07-31 PROCEDURE — 85730 THROMBOPLASTIN TIME PARTIAL: CPT

## 2018-07-31 PROCEDURE — 93010 ELECTROCARDIOGRAM REPORT: CPT | Performed by: INTERNAL MEDICINE

## 2018-07-31 PROCEDURE — 86850 RBC ANTIBODY SCREEN: CPT

## 2018-07-31 PROCEDURE — 93005 ELECTROCARDIOGRAM TRACING: CPT | Performed by: ORTHOPAEDIC SURGERY

## 2018-07-31 PROCEDURE — 36415 COLL VENOUS BLD VENIPUNCTURE: CPT

## 2018-07-31 PROCEDURE — 80048 BASIC METABOLIC PNL TOTAL CA: CPT

## 2018-07-31 PROCEDURE — 86901 BLOOD TYPING SEROLOGIC RH(D): CPT

## 2018-07-31 PROCEDURE — 86900 BLOOD TYPING SEROLOGIC ABO: CPT

## 2018-07-31 PROCEDURE — 85610 PROTHROMBIN TIME: CPT

## 2018-07-31 PROCEDURE — 84466 ASSAY OF TRANSFERRIN: CPT

## 2018-07-31 PROCEDURE — 82040 ASSAY OF SERUM ALBUMIN: CPT

## 2018-07-31 PROCEDURE — 97150 GROUP THERAPEUTIC PROCEDURES: CPT

## 2018-07-31 PROCEDURE — 81001 URINALYSIS AUTO W/SCOPE: CPT

## 2018-07-31 PROCEDURE — 97113 AQUATIC THERAPY/EXERCISES: CPT

## 2018-07-31 PROCEDURE — 83036 HEMOGLOBIN GLYCOSYLATED A1C: CPT

## 2018-07-31 PROCEDURE — 87086 URINE CULTURE/COLONY COUNT: CPT

## 2018-08-01 LAB
ESTIMATED AVERAGE GLUCOSE: 96.8 MG/DL
HBA1C MFR BLD: 5 %
URINE CULTURE, ROUTINE: NORMAL

## 2018-08-02 ENCOUNTER — HOSPITAL ENCOUNTER (OUTPATIENT)
Dept: PHYSICAL THERAPY | Age: 48
Setting detail: THERAPIES SERIES
Discharge: HOME | End: 2018-08-02
Payer: COMMERCIAL

## 2018-08-02 PROCEDURE — 97150 GROUP THERAPEUTIC PROCEDURES: CPT

## 2018-08-02 PROCEDURE — 97113 AQUATIC THERAPY/EXERCISES: CPT

## 2018-08-03 ENCOUNTER — TELEPHONE (OUTPATIENT)
Dept: CARDIOLOGY CLINIC | Age: 48
End: 2018-08-03

## 2018-08-05 ENCOUNTER — TELEPHONE (OUTPATIENT)
Dept: OTHER | Facility: CLINIC | Age: 48
End: 2018-08-05

## 2018-08-06 ENCOUNTER — TELEPHONE (OUTPATIENT)
Dept: ORTHOPEDIC SURGERY | Age: 48
End: 2018-08-06

## 2018-08-07 ENCOUNTER — HOSPITAL ENCOUNTER (OUTPATIENT)
Dept: PHYSICAL THERAPY | Age: 48
Setting detail: THERAPIES SERIES
Discharge: HOME | End: 2018-08-07
Payer: COMMERCIAL

## 2018-08-07 ENCOUNTER — TELEPHONE (OUTPATIENT)
Dept: FAMILY MEDICINE CLINIC | Age: 48
End: 2018-08-07

## 2018-08-07 PROCEDURE — 97113 AQUATIC THERAPY/EXERCISES: CPT

## 2018-08-07 PROCEDURE — 97150 GROUP THERAPEUTIC PROCEDURES: CPT

## 2018-08-07 PROCEDURE — 2500000003 HC RX 250 WO HCPCS

## 2018-08-07 NOTE — FLOWSHEET NOTE
Alter current plan (see comments)   [] Plan of care initiated [] Hold pending MD visit [] Discharge    See Weekly Progress Note: [] Yes  [x] No  Next due:

## 2018-08-09 ENCOUNTER — HOSPITAL ENCOUNTER (OUTPATIENT)
Dept: PHYSICAL THERAPY | Age: 48
Setting detail: THERAPIES SERIES
Discharge: HOME | End: 2018-08-09
Payer: COMMERCIAL

## 2018-08-09 ENCOUNTER — OFFICE VISIT (OUTPATIENT)
Dept: FAMILY MEDICINE CLINIC | Age: 48
End: 2018-08-09

## 2018-08-09 VITALS
WEIGHT: 256 LBS | HEART RATE: 96 BPM | TEMPERATURE: 99.3 F | BODY MASS INDEX: 38.8 KG/M2 | HEIGHT: 68 IN | DIASTOLIC BLOOD PRESSURE: 74 MMHG | RESPIRATION RATE: 16 BRPM | OXYGEN SATURATION: 99 % | SYSTOLIC BLOOD PRESSURE: 118 MMHG

## 2018-08-09 DIAGNOSIS — M17.12 ARTHRITIS OF LEFT KNEE: ICD-10-CM

## 2018-08-09 DIAGNOSIS — Z01.818 PREOPERATIVE EXAMINATION: Primary | ICD-10-CM

## 2018-08-09 PROCEDURE — 1036F TOBACCO NON-USER: CPT | Performed by: PHYSICIAN ASSISTANT

## 2018-08-09 PROCEDURE — 97113 AQUATIC THERAPY/EXERCISES: CPT

## 2018-08-09 PROCEDURE — 99213 OFFICE O/P EST LOW 20 MIN: CPT | Performed by: PHYSICIAN ASSISTANT

## 2018-08-09 PROCEDURE — G8417 CALC BMI ABV UP PARAM F/U: HCPCS | Performed by: PHYSICIAN ASSISTANT

## 2018-08-09 PROCEDURE — 97530 THERAPEUTIC ACTIVITIES: CPT

## 2018-08-09 PROCEDURE — 97150 GROUP THERAPEUTIC PROCEDURES: CPT

## 2018-08-09 PROCEDURE — G8427 DOCREV CUR MEDS BY ELIG CLIN: HCPCS | Performed by: PHYSICIAN ASSISTANT

## 2018-08-09 ASSESSMENT — PATIENT HEALTH QUESTIONNAIRE - PHQ9
SUM OF ALL RESPONSES TO PHQ9 QUESTIONS 1 & 2: 0
2. FEELING DOWN, DEPRESSED OR HOPELESS: 0
SUM OF ALL RESPONSES TO PHQ QUESTIONS 1-9: 0
SUM OF ALL RESPONSES TO PHQ QUESTIONS 1-9: 0
1. LITTLE INTEREST OR PLEASURE IN DOING THINGS: 0

## 2018-08-09 NOTE — FLOWSHEET NOTE
Physical Therapy Aquatic Flow Sheet  Date:  8/9/2018    Patient Name:  Saray Bolton    Medical/Treatment Diagnosis Information:  · Diagnosis: LBP  Insurance/Certification information:  PT Insurance Information: [facility]  Physician Information:  Referring Practitioner: Gareth Dias MD  Plan of care signed (Y/N):  N  Visit# / total visits:  7/8           Pain level: 8/10   Electronically signed by:  Silvio Mcnamara PT    Carl  B= Belt DB= Dumbells T= Theratube   H= Hydrotone N= Noodles W= Weights   P= Paddles S= Speedo equipment K= Kickboard     Exercises/Activities   Warm-up/Amb    Exercises      Slow forward  2 laps  HR/TR  20x    Slow sideways  2 laps  Marches  2 min    Slow backwards  2 laps  Mini-squats  20x    Medium forward    4-way SLR  20x    Medium sideways    Hip circles/fig 8  20x    Small shuffle    Hamstring curls      Jog    Knee extension      Braiding    Pelvic tilts  10x5\"    Bicycling  2 min  Scap squeezes          Shoulder flex/ext      Functional    Shoulder abd/add      Step    Shoulder H. abd/add      Lifting    Shoulder IR/ER      Hand to opp knee  10x5\" B  Rowing      Push down squat    Bilateral pull down      UE PNF    Push/pull      LE PNF    Push downs      Wall push ups    Arm circles      SLS    Elbow flex/ext          Chin tuck      Stretching    UT shrugs/rolls      Gastroc/Soleus    Rocking horse      Hamstring          SKTC  2x20\" B  Other      Piriformis    clamshell  10x5\" B    Hip flexor          Ladder pull          Pec stretch          Post deltoid           Time In:  2:00pm    Timed Code Treatment Minutes:  15 min    Total Treatment Minutes:  30 min    Treatment/Activity Tolerance:    [x] Patient tolerated treatment well [] Patient limited by fatigue   [] Patient limited by pain [] Patient limited by other medical complications  [] Other:     Prognosis: [x] Good [] Fair  [] Poor    Patient Requires Follow-up:  [x] Yes  [] No    Plan: [x] Continue per plan of care [] Alter current plan (see comments)   [] Plan of care initiated [] Hold pending MD visit [] Discharge    See Weekly Progress Note: [] Yes  [x] No  Next due:

## 2018-08-09 NOTE — FLOWSHEET NOTE
limited by pain [] Patient limited by other medical complications  [] Other:     Goals:    Short term goals  Time Frame for Short term goals: 4 weeks  Short term goal 1: pt will be indep in HEP   Short term goal 2: pt will decrease pain 25%  Short term goal 3: pt will tolerate 30 minutes of aquatic PT (met)  Short term goal 4: pt will increase B hip strength to 4/5  Short term goal 5: pt will ambulate with increased hip extension and arm swing B           Plan: [x] Continue per plan of care [] Alter current plan (see comments)   [] Plan of care initiated [] Hold pending MD visit [x] Discharge      Plan for Next Session:  Aquatic PT    Re-Certification Due Date:         Signature:  Guero [place] PT

## 2018-08-09 NOTE — PROGRESS NOTES
Preoperative Consultation      Dani Robles  YOB: 1970    Date of Service:  8/9/2018    Vitals:    08/09/18 1532   BP: 118/74   Site: Left Arm   Position: Sitting   Cuff Size: Large Adult   Pulse: 96   Resp: 16   Temp: 99.3 °F (37.4 °C)   TempSrc: Oral   SpO2: 99%   Weight: 256 lb (116.1 kg)   Height: 5' 8\" (1.727 m)      Wt Readings from Last 2 Encounters:   08/09/18 256 lb (116.1 kg)   06/04/18 283 lb 6.4 oz (128.5 kg)     BP Readings from Last 3 Encounters:   08/09/18 118/74   06/04/18 113/71   05/24/18 105/63        Chief Complaint   Patient presents with    Pre-op Exam     L TKR  08/14/2018 Dr. Sri Holguin- EKG done 07/31/18     Allergies   Allergen Reactions    Rocephin [Ceftriaxone Sodium In Dextrose] Hives     ITCH    Lisinopril Other (See Comments)     coughing     Outpatient Prescriptions Marked as Taking for the 8/9/18 encounter (Office Visit) with ROSITA Jarrell   Medication Sig Dispense Refill    MORPHINE SULFATE IR PO Take 15 mg by mouth 3 times daily      XARELTO 20 MG TABS tablet TAKE 1 TABLET BY MOUTH EVERY 24 HOURS. PT NEEDS APPT BEFORE NEXT REFILL 30 tablet 0    valACYclovir (VALTREX) 1 g tablet TAKE 2 TABLETS BY MOUTH EVERY 12 HOURS FOR 2 DAYS 8 tablet 1    losartan-hydrochlorothiazide (HYZAAR) 50-12.5 MG per tablet TAKE 1 TABLET BY MOUTH DAILY 30 tablet 0    ondansetron (ZOFRAN ODT) 4 MG disintegrating tablet Take 2 tablets by mouth every 8 hours as needed for Nausea 30 tablet 1    buPROPion (WELLBUTRIN XL) 300 MG extended release tablet TAKE (1) TABLET BY MOUTH ONCE DAILY 30 tablet 11    citalopram (CELEXA) 40 MG tablet TAKE (1) TABLET BY MOUTH ONCE DAILY 30 tablet 11    amphetamine-dextroamphetamine (ADDERALL XR) 30 MG extended release capsule Take 30 mg by mouth 2 times daily .       furosemide (LASIX) 40 MG tablet Take 1 tablet by mouth daily (Patient taking differently: Take 40 mg by mouth as needed ) 60 tablet 1       This patient presents to the office today for a clots 2004    PE and DVT    Hypertension     controlled lately    Hypertrophic cardiomyopathy ([address]     mild, echo 2016    Hypoglycemia 2012    PAST HISTORY    Lupus anticoagulant disorder ([address]     Narcolepsy     PE (pulmonary embolism)     Pneumonia     Pulmonary edema 03/2016    Rhe[facility]oid arthritis [facility]     Sleep apnea     CPAP     Past Surgical History:   Procedure Laterality Date    CHOLECYSTECTOMY, LAPAROSCOPIC      FOOT SURGERY      bone spur  - rt foot    HYSTEROSCOPY  05/25/2017    D & C    KNEE ARTHROSCOPY Left 2006    MENISCUS    KNEE ARTHROSCOPY Left 09/09/2016    MENISCUS    SLEEVE GASTRECTOMY  11/29/2017    LAPAROSCOPIC SLEEVE GASTRECTOMY-ETHICON, LIVER BIOPSY    UMBILICAL HERNIA REPAIR  11/05/2012    LAPAROSCOPIC WITH PHYSIO MESH     Family History   Problem Relation Age of Onset    Adopted: Yes     Social History     Social History    Marital status:      Spouse name: N/A    Number of children: N/A    Years of education: N/A     Occupational History    Not on file. Social History Main Topics    Smoking status: Never Smoker    Smokeless tobacco: Never Used    Alcohol use No    Drug use: No      Comment: on chronic percocet for back pain     Sexual activity: Not Currently     Other Topics Concern    Not on file     Social History Narrative    No narrative on file       Review of Systems  A comprehensive review of systems was negative except for what was noted in the HPI. Physical Exam   Constitutional: She is oriented to person, place, and time. She appears well-developed and well-nourished. No distress. HENT:   Head: Normocephalic and atraumatic. Mouth/Throat: Uvula is midline, oropharynx is clear and moist and mucous membranes are normal.   Eyes: Conjunctivae and EOM are normal. Pupils are equal, round, and reactive to light. Neck: Trachea normal and normal range of motion. Neck supple. No JVD present. Carotid bruit is not present.  No mass and no thyromegaly present. Cardiovascular: Normal rate, regular rhythm, normal heart sounds and intact distal pulses. Exam reveals no gallop and no friction rub. No murmur heard. Pulmonary/Chest: Effort normal and breath sounds normal. No respiratory distress. She has no wheezes. She has no rales. Abdominal: Soft. Normal aorta and bowel sounds are normal. She exhibits no distension and no mass. There is no hepatosplenomegaly. No tenderness. Musculoskeletal: She exhibits no edema and no tenderness. Neurological: She is alert and oriented to person, place, and time. She has normal strength. No cranial nerve deficit or sensory deficit. Coordination and gait normal.   Skin: Skin is warm and dry. No rash noted. No erythema. Psychiatric: She has a normal mood and affect. Her behavior is normal.     EKG Interpretation:  NA, performed 7/31Baseline artifactSinus bradycardiaOtherwise normal ECGWhen compared with ECG of 27-SEP-2017.     Lab Review   Hospital Outpatient Visit on 07/31/2018   Component Date Value    WBC 07/31/2018 5.3     RBC 07/31/2018 4.19     Hemoglobin 07/31/2018 12.0     Hematocrit 07/31/2018 33.9*    MCV 07/31/2018 80.8     MCH 07/31/2018 28.5     MCHC 07/31/2018 35.3     RDW 07/31/2018 14.4     Platelets 10/48/5325 215     MPV 07/31/2018 7.9     PLATELET SLIDE REVIEW 07/31/2018 Adequate     Neutrophils % 07/31/2018 58.6     Lymphocytes % 07/31/2018 33.9     Monocytes % 07/31/2018 4.4     Eosinophils % 07/31/2018 2.4     Basophils % 07/31/2018 0.7     Neutrophils # 07/31/2018 3.1     Lymphocytes # 07/31/2018 1.8     Monocytes # 07/31/2018 0.2     Eosinophils # 07/31/2018 0.1     Basophils # 07/31/2018 0.0     Protime 07/31/2018 12.2     INR 07/31/2018 1.07     aPTT 07/31/2018 36.4*    Sodium 07/31/2018 142     Potassium 07/31/2018 3.2*    Chloride 07/31/2018 104     CO2 07/31/2018 27     Anion Gap 07/31/2018 11     Glucose 07/31/2018 117*    BUN 07/31/2018 9     CREATININE 07/31/2018 0.7     GFR Non- 07/31/2018 >60     GFR  07/31/2018 >60     Calcium 07/31/2018 8.6     Color, UA 07/31/2018 Yellow     Clarity, UA 07/31/2018 Clear     Glucose, Ur 07/31/2018 Negative     Bilirubin Urine 07/31/2018 SMALL*    Ketones, Urine 07/31/2018 Negative     Specific Gravity, UA 07/31/2018 1.020     Blood, Urine 07/31/2018 Negative     pH, UA 07/31/2018 6.5     Protein, UA 07/31/2018 TRACE*    Urobilinogen, Urine 07/31/2018 >=8.0*    Nitrite, Urine 07/31/2018 Negative     Leukocyte Esterase, Urine 07/31/2018 TRACE*    Microscopic Examination 07/31/2018 YES     Urine Type 07/31/2018 Not Specified     Alb 07/31/2018 3.3*    Transferrin 07/31/2018 213.0     Hemoglobin A1C 07/31/2018 5.0     eAG 07/31/2018 96.8     ABO/Rh 07/31/2018 A NEG     Antibody Screen 07/31/2018 NEG     Urine Culture, Routine 07/31/2018 <50,000 CFU/ml mixed skin/urogenital nanci. No further workup     Ventricular Rate 07/31/2018 59     Atrial Rate 07/31/2018 59     P-R Interval 07/31/2018 158     QRS Duration 07/31/2018 82     Q-T Interval 07/31/2018 442     QTc Calculation (Bazett) 07/31/2018 437     P Axis 07/31/2018 65     R Axis 07/31/2018 2     T Axis 07/31/2018 20     Diagnosis 07/31/2018 Baseline artifactSinus bradycardiaOtherwise normal ECGWhen compared with ECG of 27-SEP-2017 15:35,No significant change was foundConfirmed by Elle Palencia MD, Seng Juarez ([id number]) on 7/31/2018 6:08:06 PM     Mucus, UA 07/31/2018 1+*    WBC, UA 07/31/2018 20-50*    RBC, UA 07/31/2018 0-2     Epi Cells 07/31/2018 20-50     Bacteria, UA 07/31/2018 Rare*           Assessment:       51 y.o. patient with planned surgery as above.     Known risk factors for perioperative complications: Clotting disorder- lupus anticoagulat, Congestive heart failure, Hypertension, Obstructive sleep apnea  Current medications which may produce withdrawal symptoms if withheld perioperatively: xarelto        Plan:     1. Preoperative workup as follows: electrolytes- has been ordered as a preadmit labs  2. Change in medication regimen before surgery: None  3. Prophylaxis for cardiac events with perioperative beta-blockers: Not indicated  ACC/AHA indications for pre-operative beta-blocker use:    · Vascular surgery with history of postitive stress test  · Intermediate or high risk surgery with history of CAD   · Intermediate or high risk surgery with multiple clinical predictors of CAD- 2 of the following: history of compensated or prior heart failure, history of cerebrovascular disease, DM, or renal insufficiency    Routine administration of higher-dose, long-acting metoprolol in beta-blockernaïve patients on the day of surgery, and in the absence of dose titration is associated with an overall increase in mortality. Beta-blockers should be started days to weeks prior to surgery and titrated to pulse < 70.  4. Deep vein thrombosis prophylaxis: regimen to be chosen by surgical team  5. No contraindications to planned surgery ed until pending cardiology anticoagulation recommendations.

## 2018-08-11 ENCOUNTER — ANESTHESIA EVENT (OUTPATIENT)
Dept: OPERATING ROOM | Age: 48
DRG: 302 | End: 2018-08-11
Payer: COMMERCIAL

## 2018-08-13 ENCOUNTER — TELEPHONE (OUTPATIENT)
Dept: CARDIOLOGY CLINIC | Age: 48
End: 2018-08-13

## 2018-08-13 NOTE — TELEPHONE ENCOUNTER
Yvonne from [facility] Pre Admission Testing called stating they see the clx notation by VSP on 8-6-18, but there is no notation that pt already stopped her Xarelto on 8-7-18. PAT is needing a notation in Epic stating when pt should restart her Xarelto after her total knee replacement on 8-14-18.

## 2018-08-14 ENCOUNTER — APPOINTMENT (OUTPATIENT)
Dept: PHYSICAL THERAPY | Age: 48
End: 2018-08-14
Payer: COMMERCIAL

## 2018-08-14 ENCOUNTER — APPOINTMENT (OUTPATIENT)
Dept: GENERAL RADIOLOGY | Age: 48
DRG: 302 | End: 2018-08-14
Attending: ORTHOPAEDIC SURGERY
Payer: COMMERCIAL

## 2018-08-14 ENCOUNTER — HOSPITAL ENCOUNTER (INPATIENT)
Age: 48
LOS: 3 days | Discharge: NURSING, REHAB OR LONG-TERM CARE | DRG: 302 | End: 2018-08-17
Attending: ORTHOPAEDIC SURGERY | Admitting: ORTHOPAEDIC SURGERY
Payer: COMMERCIAL

## 2018-08-14 ENCOUNTER — ANESTHESIA (OUTPATIENT)
Dept: OPERATING ROOM | Age: 48
DRG: 302 | End: 2018-08-14
Payer: COMMERCIAL

## 2018-08-14 VITALS — TEMPERATURE: 95.2 F | SYSTOLIC BLOOD PRESSURE: 144 MMHG | DIASTOLIC BLOOD PRESSURE: 90 MMHG | OXYGEN SATURATION: 100 %

## 2018-08-14 DIAGNOSIS — Z96.652 STATUS POST TOTAL LEFT KNEE REPLACEMENT: Primary | ICD-10-CM

## 2018-08-14 DIAGNOSIS — M17.12 OSTEOARTHRITIS OF LEFT KNEE, UNSPECIFIED OSTEOARTHRITIS TYPE: ICD-10-CM

## 2018-08-14 LAB
ABO/RH: NORMAL
ANTIBODY SCREEN: NORMAL
APTT: 36.4 SEC (ref 26–36)
POTASSIUM SERPL-SCNC: 3.5 MMOL/L (ref 3.5–5.1)
PREGNANCY, URINE: NEGATIVE

## 2018-08-14 PROCEDURE — 88311 DECALCIFY TISSUE: CPT

## 2018-08-14 PROCEDURE — 6360000002 HC RX W HCPCS

## 2018-08-14 PROCEDURE — 3E0T3BZ INTRODUCTION OF ANESTHETIC AGENT INTO PERIPHERAL NERVES AND PLEXI, PERCUTANEOUS APPROACH: ICD-10-PCS | Performed by: ORTHOPAEDIC SURGERY

## 2018-08-14 PROCEDURE — 2580000003 HC RX 258: Performed by: ORTHOPAEDIC SURGERY

## 2018-08-14 PROCEDURE — G8979 MOBILITY GOAL STATUS: HCPCS

## 2018-08-14 PROCEDURE — 2500000003 HC RX 250 WO HCPCS: Performed by: ORTHOPAEDIC SURGERY

## 2018-08-14 PROCEDURE — 6370000000 HC RX 637 (ALT 250 FOR IP): Performed by: PHYSICIAN ASSISTANT

## 2018-08-14 PROCEDURE — 6370000000 HC RX 637 (ALT 250 FOR IP): Performed by: ORTHOPAEDIC SURGERY

## 2018-08-14 PROCEDURE — 97116 GAIT TRAINING THERAPY: CPT

## 2018-08-14 PROCEDURE — 94761 N-INVAS EAR/PLS OXIMETRY MLT: CPT

## 2018-08-14 PROCEDURE — 84703 CHORIONIC GONADOTROPIN ASSAY: CPT

## 2018-08-14 PROCEDURE — 1200000000 HC SEMI PRIVATE

## 2018-08-14 PROCEDURE — 3600000018 HC SURGERY OHS ADDTL 15MIN: Performed by: ORTHOPAEDIC SURGERY

## 2018-08-14 PROCEDURE — C9290 INJ, BUPIVACAINE LIPOSOME: HCPCS | Performed by: ORTHOPAEDIC SURGERY

## 2018-08-14 PROCEDURE — 85730 THROMBOPLASTIN TIME PARTIAL: CPT

## 2018-08-14 PROCEDURE — 73560 X-RAY EXAM OF KNEE 1 OR 2: CPT

## 2018-08-14 PROCEDURE — 6370000000 HC RX 637 (ALT 250 FOR IP): Performed by: ANESTHESIOLOGY

## 2018-08-14 PROCEDURE — 76942 ECHO GUIDE FOR BIOPSY: CPT | Performed by: ANESTHESIOLOGY

## 2018-08-14 PROCEDURE — 2500000003 HC RX 250 WO HCPCS: Performed by: NURSE ANESTHETIST, CERTIFIED REGISTERED

## 2018-08-14 PROCEDURE — C1713 ANCHOR/SCREW BN/BN,TIS/BN: HCPCS | Performed by: ORTHOPAEDIC SURGERY

## 2018-08-14 PROCEDURE — 6360000002 HC RX W HCPCS: Performed by: PHYSICIAN ASSISTANT

## 2018-08-14 PROCEDURE — 97530 THERAPEUTIC ACTIVITIES: CPT

## 2018-08-14 PROCEDURE — C1776 JOINT DEVICE (IMPLANTABLE): HCPCS | Performed by: ORTHOPAEDIC SURGERY

## 2018-08-14 PROCEDURE — 3600000008 HC SURGERY OHS BASE: Performed by: ORTHOPAEDIC SURGERY

## 2018-08-14 PROCEDURE — 94150 VITAL CAPACITY TEST: CPT

## 2018-08-14 PROCEDURE — 97110 THERAPEUTIC EXERCISES: CPT

## 2018-08-14 PROCEDURE — 0SRD069 REPLACEMENT OF LEFT KNEE JOINT WITH OXIDIZED ZIRCONIUM ON POLYETHYLENE SYNTHETIC SUBSTITUTE, CEMENTED, OPEN APPROACH: ICD-10-PCS | Performed by: ORTHOPAEDIC SURGERY

## 2018-08-14 PROCEDURE — 2580000003 HC RX 258: Performed by: ANESTHESIOLOGY

## 2018-08-14 PROCEDURE — 2580000003 HC RX 258: Performed by: PHYSICIAN ASSISTANT

## 2018-08-14 PROCEDURE — 3700000000 HC ANESTHESIA ATTENDED CARE: Performed by: ORTHOPAEDIC SURGERY

## 2018-08-14 PROCEDURE — 3700000001 HC ADD 15 MINUTES (ANESTHESIA): Performed by: ORTHOPAEDIC SURGERY

## 2018-08-14 PROCEDURE — 94664 DEMO&/EVAL PT USE INHALER: CPT

## 2018-08-14 PROCEDURE — 6360000002 HC RX W HCPCS: Performed by: ORTHOPAEDIC SURGERY

## 2018-08-14 PROCEDURE — 86901 BLOOD TYPING SEROLOGIC RH(D): CPT

## 2018-08-14 PROCEDURE — 88305 TISSUE EXAM BY PATHOLOGIST: CPT

## 2018-08-14 PROCEDURE — 64447 NJX AA&/STRD FEMORAL NRV IMG: CPT | Performed by: ANESTHESIOLOGY

## 2018-08-14 PROCEDURE — 86900 BLOOD TYPING SEROLOGIC ABO: CPT

## 2018-08-14 PROCEDURE — 2700000000 HC OXYGEN THERAPY PER DAY

## 2018-08-14 PROCEDURE — G8987 SELF CARE CURRENT STATUS: HCPCS

## 2018-08-14 PROCEDURE — 2709999900 HC NON-CHARGEABLE SUPPLY: Performed by: ORTHOPAEDIC SURGERY

## 2018-08-14 PROCEDURE — G8988 SELF CARE GOAL STATUS: HCPCS

## 2018-08-14 PROCEDURE — 7100000000 HC PACU RECOVERY - FIRST 15 MIN: Performed by: ORTHOPAEDIC SURGERY

## 2018-08-14 PROCEDURE — 2720000010 HC SURG SUPPLY STERILE: Performed by: ORTHOPAEDIC SURGERY

## 2018-08-14 PROCEDURE — 7100000001 HC PACU RECOVERY - ADDTL 15 MIN: Performed by: ORTHOPAEDIC SURGERY

## 2018-08-14 PROCEDURE — 84132 ASSAY OF SERUM POTASSIUM: CPT

## 2018-08-14 PROCEDURE — 6360000002 HC RX W HCPCS: Performed by: ANESTHESIOLOGY

## 2018-08-14 PROCEDURE — 2720000001 HC MISC SURG SUPPLY STERILE $51-500: Performed by: ORTHOPAEDIC SURGERY

## 2018-08-14 PROCEDURE — 86850 RBC ANTIBODY SCREEN: CPT

## 2018-08-14 PROCEDURE — G8978 MOBILITY CURRENT STATUS: HCPCS

## 2018-08-14 PROCEDURE — 97161 PT EVAL LOW COMPLEX 20 MIN: CPT

## 2018-08-14 PROCEDURE — 6360000002 HC RX W HCPCS: Performed by: NURSE ANESTHETIST, CERTIFIED REGISTERED

## 2018-08-14 PROCEDURE — 97165 OT EVAL LOW COMPLEX 30 MIN: CPT

## 2018-08-14 DEVICE — RALLY HV BONE CEMENT 40 GRAMS
Type: IMPLANTABLE DEVICE | Site: KNEE | Status: FUNCTIONAL
Brand: RALLY

## 2018-08-14 DEVICE — GENESIS II RESURFACING PATELLAR                                    PROSTHESIS  32MM
Type: IMPLANTABLE DEVICE | Site: KNEE | Status: FUNCTIONAL
Brand: GENESIS II

## 2018-08-14 DEVICE — GENESIS II NON-POROUS TIBIAL                                    BASEPLATE SIZE 5 LEFT
Type: IMPLANTABLE DEVICE | Site: KNEE | Status: FUNCTIONAL
Brand: GENESIS II

## 2018-08-14 DEVICE — LEGION POSTERIOR STABILIZED HIGH                                    FLEX HIGHLY CROSS LINKED                                    POLYETHYLENE SIZE 5-6 9MM
Type: IMPLANTABLE DEVICE | Site: KNEE | Status: FUNCTIONAL
Brand: LEGION

## 2018-08-14 DEVICE — LEGION POSTERIOR STABILIZED                                    OXINIUM FEMORAL SIZE 6 LEFT
Type: IMPLANTABLE DEVICE | Site: KNEE | Status: FUNCTIONAL
Brand: LEGION

## 2018-08-14 RX ORDER — SODIUM CHLORIDE 0.9 % (FLUSH) 0.9 %
10 SYRINGE (ML) INJECTION PRN
Status: DISCONTINUED | OUTPATIENT
Start: 2018-08-14 | End: 2018-08-17 | Stop reason: HOSPADM

## 2018-08-14 RX ORDER — SODIUM CHLORIDE 0.9 % (FLUSH) 0.9 %
10 SYRINGE (ML) INJECTION PRN
Status: DISCONTINUED | OUTPATIENT
Start: 2018-08-14 | End: 2018-08-14 | Stop reason: HOSPADM

## 2018-08-14 RX ORDER — SODIUM CHLORIDE, SODIUM LACTATE, POTASSIUM CHLORIDE, CALCIUM CHLORIDE 600; 310; 30; 20 MG/100ML; MG/100ML; MG/100ML; MG/100ML
INJECTION, SOLUTION INTRAVENOUS CONTINUOUS
Status: DISCONTINUED | OUTPATIENT
Start: 2018-08-14 | End: 2018-08-14

## 2018-08-14 RX ORDER — CELECOXIB 100 MG/1
200 CAPSULE ORAL ONCE
Status: DISCONTINUED | OUTPATIENT
Start: 2018-08-14 | End: 2018-08-14 | Stop reason: HOSPADM

## 2018-08-14 RX ORDER — OXYCODONE HYDROCHLORIDE 5 MG/1
5 TABLET ORAL EVERY 4 HOURS PRN
Status: DISCONTINUED | OUTPATIENT
Start: 2018-08-14 | End: 2018-08-17 | Stop reason: HOSPADM

## 2018-08-14 RX ORDER — HYDROMORPHONE HCL 110MG/55ML
PATIENT CONTROLLED ANALGESIA SYRINGE INTRAVENOUS PRN
Status: DISCONTINUED | OUTPATIENT
Start: 2018-08-14 | End: 2018-08-14 | Stop reason: SDUPTHER

## 2018-08-14 RX ORDER — DEXTROAMPHETAMINE SACCHARATE, AMPHETAMINE ASPARTATE MONOHYDRATE, DEXTROAMPHETAMINE SULFATE AND AMPHETAMINE SULFATE 2.5; 2.5; 2.5; 2.5 MG/1; MG/1; MG/1; MG/1
30 CAPSULE, EXTENDED RELEASE ORAL 2 TIMES DAILY
Status: DISCONTINUED | OUTPATIENT
Start: 2018-08-14 | End: 2018-08-17 | Stop reason: HOSPADM

## 2018-08-14 RX ORDER — PROPOFOL 10 MG/ML
INJECTION, EMULSION INTRAVENOUS PRN
Status: DISCONTINUED | OUTPATIENT
Start: 2018-08-14 | End: 2018-08-14 | Stop reason: SDUPTHER

## 2018-08-14 RX ORDER — BUPROPION HYDROCHLORIDE 150 MG/1
300 TABLET ORAL DAILY
Status: DISCONTINUED | OUTPATIENT
Start: 2018-08-14 | End: 2018-08-17 | Stop reason: HOSPADM

## 2018-08-14 RX ORDER — CYCLOBENZAPRINE HCL 10 MG
10 TABLET ORAL ONCE
Status: COMPLETED | OUTPATIENT
Start: 2018-08-14 | End: 2018-08-14

## 2018-08-14 RX ORDER — GLYCOPYRROLATE 0.2 MG/ML
INJECTION INTRAMUSCULAR; INTRAVENOUS PRN
Status: DISCONTINUED | OUTPATIENT
Start: 2018-08-14 | End: 2018-08-14 | Stop reason: SDUPTHER

## 2018-08-14 RX ORDER — HYDRALAZINE HYDROCHLORIDE 20 MG/ML
5 INJECTION INTRAMUSCULAR; INTRAVENOUS EVERY 10 MIN PRN
Status: DISCONTINUED | OUTPATIENT
Start: 2018-08-14 | End: 2018-08-14 | Stop reason: HOSPADM

## 2018-08-14 RX ORDER — LIDOCAINE HYDROCHLORIDE 20 MG/ML
INJECTION, SOLUTION INFILTRATION; PERINEURAL PRN
Status: DISCONTINUED | OUTPATIENT
Start: 2018-08-14 | End: 2018-08-14 | Stop reason: SDUPTHER

## 2018-08-14 RX ORDER — OXYCODONE HYDROCHLORIDE 5 MG/1
10 TABLET ORAL EVERY 4 HOURS PRN
Status: DISCONTINUED | OUTPATIENT
Start: 2018-08-14 | End: 2018-08-17 | Stop reason: HOSPADM

## 2018-08-14 RX ORDER — CELECOXIB 100 MG/1
100 CAPSULE ORAL 2 TIMES DAILY
Status: DISCONTINUED | OUTPATIENT
Start: 2018-08-14 | End: 2018-08-17 | Stop reason: HOSPADM

## 2018-08-14 RX ORDER — LOSARTAN POTASSIUM 25 MG/1
50 TABLET ORAL DAILY
Status: DISCONTINUED | OUTPATIENT
Start: 2018-08-14 | End: 2018-08-17 | Stop reason: HOSPADM

## 2018-08-14 RX ORDER — MORPHINE SULFATE 15 MG/1
15 TABLET ORAL EVERY 8 HOURS PRN
Status: DISCONTINUED | OUTPATIENT
Start: 2018-08-14 | End: 2018-08-17 | Stop reason: HOSPADM

## 2018-08-14 RX ORDER — LIDOCAINE HYDROCHLORIDE 10 MG/ML
1 INJECTION, SOLUTION EPIDURAL; INFILTRATION; INTRACAUDAL; PERINEURAL
Status: DISCONTINUED | OUTPATIENT
Start: 2018-08-14 | End: 2018-08-14 | Stop reason: HOSPADM

## 2018-08-14 RX ORDER — PROMETHAZINE HYDROCHLORIDE 25 MG/ML
6.25 INJECTION, SOLUTION INTRAMUSCULAR; INTRAVENOUS
Status: DISCONTINUED | OUTPATIENT
Start: 2018-08-14 | End: 2018-08-14 | Stop reason: HOSPADM

## 2018-08-14 RX ORDER — ACETAMINOPHEN 500 MG
1000 TABLET ORAL ONCE
Status: COMPLETED | OUTPATIENT
Start: 2018-08-14 | End: 2018-08-14

## 2018-08-14 RX ORDER — DOCUSATE SODIUM 100 MG/1
100 CAPSULE, LIQUID FILLED ORAL 2 TIMES DAILY
Status: DISCONTINUED | OUTPATIENT
Start: 2018-08-14 | End: 2018-08-17 | Stop reason: HOSPADM

## 2018-08-14 RX ORDER — SODIUM CHLORIDE 0.9 % (FLUSH) 0.9 %
10 SYRINGE (ML) INJECTION EVERY 12 HOURS SCHEDULED
Status: DISCONTINUED | OUTPATIENT
Start: 2018-08-14 | End: 2018-08-17 | Stop reason: HOSPADM

## 2018-08-14 RX ORDER — FUROSEMIDE 40 MG/1
40 TABLET ORAL PRN
Status: DISCONTINUED | OUTPATIENT
Start: 2018-08-14 | End: 2018-08-17 | Stop reason: HOSPADM

## 2018-08-14 RX ORDER — CITALOPRAM 20 MG/1
40 TABLET ORAL DAILY
Status: DISCONTINUED | OUTPATIENT
Start: 2018-08-14 | End: 2018-08-17 | Stop reason: HOSPADM

## 2018-08-14 RX ORDER — FENTANYL CITRATE 50 UG/ML
INJECTION, SOLUTION INTRAMUSCULAR; INTRAVENOUS PRN
Status: DISCONTINUED | OUTPATIENT
Start: 2018-08-14 | End: 2018-08-14 | Stop reason: SDUPTHER

## 2018-08-14 RX ORDER — HYDROCHLOROTHIAZIDE 12.5 MG/1
12.5 CAPSULE, GELATIN COATED ORAL DAILY
Status: DISCONTINUED | OUTPATIENT
Start: 2018-08-14 | End: 2018-08-17 | Stop reason: HOSPADM

## 2018-08-14 RX ORDER — LOSARTAN POTASSIUM AND HYDROCHLOROTHIAZIDE 12.5; 5 MG/1; MG/1
1 TABLET ORAL DAILY
Status: DISCONTINUED | OUTPATIENT
Start: 2018-08-14 | End: 2018-08-14 | Stop reason: CLARIF

## 2018-08-14 RX ORDER — MORPHINE SULFATE 4 MG/ML
1 INJECTION, SOLUTION INTRAMUSCULAR; INTRAVENOUS EVERY 5 MIN PRN
Status: DISCONTINUED | OUTPATIENT
Start: 2018-08-14 | End: 2018-08-14 | Stop reason: HOSPADM

## 2018-08-14 RX ORDER — LABETALOL HYDROCHLORIDE 5 MG/ML
5 INJECTION, SOLUTION INTRAVENOUS EVERY 10 MIN PRN
Status: DISCONTINUED | OUTPATIENT
Start: 2018-08-14 | End: 2018-08-14 | Stop reason: HOSPADM

## 2018-08-14 RX ORDER — ONDANSETRON 4 MG/1
8 TABLET, ORALLY DISINTEGRATING ORAL EVERY 8 HOURS PRN
Status: DISCONTINUED | OUTPATIENT
Start: 2018-08-14 | End: 2018-08-17 | Stop reason: HOSPADM

## 2018-08-14 RX ORDER — MIDAZOLAM HYDROCHLORIDE 5 MG/ML
INJECTION INTRAMUSCULAR; INTRAVENOUS PRN
Status: DISCONTINUED | OUTPATIENT
Start: 2018-08-14 | End: 2018-08-14 | Stop reason: SDUPTHER

## 2018-08-14 RX ORDER — ACETAMINOPHEN 325 MG/1
650 TABLET ORAL EVERY 4 HOURS PRN
Status: DISCONTINUED | OUTPATIENT
Start: 2018-08-14 | End: 2018-08-17 | Stop reason: HOSPADM

## 2018-08-14 RX ORDER — ROCURONIUM BROMIDE 10 MG/ML
INJECTION, SOLUTION INTRAVENOUS PRN
Status: DISCONTINUED | OUTPATIENT
Start: 2018-08-14 | End: 2018-08-14 | Stop reason: SDUPTHER

## 2018-08-14 RX ORDER — OXYCODONE HYDROCHLORIDE AND ACETAMINOPHEN 5; 325 MG/1; MG/1
2 TABLET ORAL PRN
Status: DISCONTINUED | OUTPATIENT
Start: 2018-08-14 | End: 2018-08-14 | Stop reason: HOSPADM

## 2018-08-14 RX ORDER — MORPHINE SULFATE 4 MG/ML
2 INJECTION, SOLUTION INTRAMUSCULAR; INTRAVENOUS EVERY 5 MIN PRN
Status: DISCONTINUED | OUTPATIENT
Start: 2018-08-14 | End: 2018-08-14 | Stop reason: HOSPADM

## 2018-08-14 RX ORDER — OXYCODONE HYDROCHLORIDE AND ACETAMINOPHEN 5; 325 MG/1; MG/1
1 TABLET ORAL PRN
Status: DISCONTINUED | OUTPATIENT
Start: 2018-08-14 | End: 2018-08-14 | Stop reason: HOSPADM

## 2018-08-14 RX ORDER — DIPHENHYDRAMINE HYDROCHLORIDE 50 MG/ML
12.5 INJECTION INTRAMUSCULAR; INTRAVENOUS
Status: DISCONTINUED | OUTPATIENT
Start: 2018-08-14 | End: 2018-08-14 | Stop reason: HOSPADM

## 2018-08-14 RX ORDER — SODIUM CHLORIDE 0.9 % (FLUSH) 0.9 %
10 SYRINGE (ML) INJECTION EVERY 12 HOURS SCHEDULED
Status: DISCONTINUED | OUTPATIENT
Start: 2018-08-14 | End: 2018-08-14 | Stop reason: HOSPADM

## 2018-08-14 RX ORDER — DEXAMETHASONE SODIUM PHOSPHATE 4 MG/ML
INJECTION, SOLUTION INTRA-ARTICULAR; INTRALESIONAL; INTRAMUSCULAR; INTRAVENOUS; SOFT TISSUE PRN
Status: DISCONTINUED | OUTPATIENT
Start: 2018-08-14 | End: 2018-08-14 | Stop reason: SDUPTHER

## 2018-08-14 RX ORDER — DEXTROAMPHETAMINE SULFATE 10 MG/1
10 TABLET ORAL 2 TIMES DAILY PRN
Status: DISCONTINUED | OUTPATIENT
Start: 2018-08-14 | End: 2018-08-17 | Stop reason: HOSPADM

## 2018-08-14 RX ORDER — ONDANSETRON 2 MG/ML
4 INJECTION INTRAMUSCULAR; INTRAVENOUS EVERY 6 HOURS PRN
Status: DISCONTINUED | OUTPATIENT
Start: 2018-08-14 | End: 2018-08-17 | Stop reason: HOSPADM

## 2018-08-14 RX ORDER — MEPERIDINE HYDROCHLORIDE 50 MG/ML
12.5 INJECTION INTRAMUSCULAR; INTRAVENOUS; SUBCUTANEOUS EVERY 5 MIN PRN
Status: DISCONTINUED | OUTPATIENT
Start: 2018-08-14 | End: 2018-08-14 | Stop reason: HOSPADM

## 2018-08-14 RX ORDER — SODIUM CHLORIDE 9 MG/ML
INJECTION, SOLUTION INTRAVENOUS CONTINUOUS
Status: DISCONTINUED | OUTPATIENT
Start: 2018-08-14 | End: 2018-08-17 | Stop reason: HOSPADM

## 2018-08-14 RX ORDER — ONDANSETRON 2 MG/ML
INJECTION INTRAMUSCULAR; INTRAVENOUS PRN
Status: DISCONTINUED | OUTPATIENT
Start: 2018-08-14 | End: 2018-08-14 | Stop reason: SDUPTHER

## 2018-08-14 RX ORDER — ONDANSETRON 2 MG/ML
4 INJECTION INTRAMUSCULAR; INTRAVENOUS
Status: DISCONTINUED | OUTPATIENT
Start: 2018-08-14 | End: 2018-08-14 | Stop reason: HOSPADM

## 2018-08-14 RX ORDER — CYCLOBENZAPRINE HCL 10 MG
10 TABLET ORAL 3 TIMES DAILY PRN
Status: DISCONTINUED | OUTPATIENT
Start: 2018-08-14 | End: 2018-08-17 | Stop reason: HOSPADM

## 2018-08-14 RX ADMIN — CITALOPRAM HYDROBROMIDE 40 MG: 20 TABLET ORAL at 22:21

## 2018-08-14 RX ADMIN — DOCUSATE SODIUM 100 MG: 100 CAPSULE, LIQUID FILLED ORAL at 22:22

## 2018-08-14 RX ADMIN — DEXAMETHASONE SODIUM PHOSPHATE 8 MG: 4 INJECTION, SOLUTION INTRAMUSCULAR; INTRAVENOUS at 08:36

## 2018-08-14 RX ADMIN — ONDANSETRON 4 MG: 2 INJECTION INTRAMUSCULAR; INTRAVENOUS at 08:35

## 2018-08-14 RX ADMIN — HYDROCHLOROTHIAZIDE 12.5 MG: 12.5 CAPSULE ORAL at 16:41

## 2018-08-14 RX ADMIN — LIDOCAINE HYDROCHLORIDE 50 MG: 20 INJECTION, SOLUTION INFILTRATION; PERINEURAL at 08:08

## 2018-08-14 RX ADMIN — OXYCODONE HYDROCHLORIDE 10 MG: 5 TABLET ORAL at 16:52

## 2018-08-14 RX ADMIN — ACETAMINOPHEN 1000 MG: 500 TABLET ORAL at 07:28

## 2018-08-14 RX ADMIN — FENTANYL CITRATE 50 MCG: 50 INJECTION, SOLUTION INTRAMUSCULAR; INTRAVENOUS at 09:35

## 2018-08-14 RX ADMIN — ROCURONIUM BROMIDE 50 MG: 10 SOLUTION INTRAVENOUS at 08:08

## 2018-08-14 RX ADMIN — BUPROPION HYDROCHLORIDE 300 MG: 150 TABLET, FILM COATED, EXTENDED RELEASE ORAL at 16:41

## 2018-08-14 RX ADMIN — SODIUM CHLORIDE, POTASSIUM CHLORIDE, SODIUM LACTATE AND CALCIUM CHLORIDE: 600; 310; 30; 20 INJECTION, SOLUTION INTRAVENOUS at 09:00

## 2018-08-14 RX ADMIN — SODIUM CHLORIDE: 9 INJECTION, SOLUTION INTRAVENOUS at 16:15

## 2018-08-14 RX ADMIN — HYDROMORPHONE HYDROCHLORIDE 0.5 MG: 1 INJECTION, SOLUTION INTRAMUSCULAR; INTRAVENOUS; SUBCUTANEOUS at 14:41

## 2018-08-14 RX ADMIN — HYDROMORPHONE HYDROCHLORIDE 1 MG: 2 INJECTION, SOLUTION INTRAMUSCULAR; INTRAVENOUS; SUBCUTANEOUS at 09:43

## 2018-08-14 RX ADMIN — Medication 1.5 G: at 08:05

## 2018-08-14 RX ADMIN — OXYCODONE HYDROCHLORIDE 10 MG: 5 TABLET ORAL at 22:21

## 2018-08-14 RX ADMIN — Medication 2 G: at 16:41

## 2018-08-14 RX ADMIN — LOSARTAN POTASSIUM 50 MG: 25 TABLET, FILM COATED ORAL at 16:41

## 2018-08-14 RX ADMIN — CELECOXIB 100 MG: 100 CAPSULE ORAL at 22:21

## 2018-08-14 RX ADMIN — SUGAMMADEX 200 MG: 100 INJECTION, SOLUTION INTRAVENOUS at 09:32

## 2018-08-14 RX ADMIN — FENTANYL CITRATE 100 MCG: 50 INJECTION, SOLUTION INTRAMUSCULAR; INTRAVENOUS at 08:08

## 2018-08-14 RX ADMIN — HYDROMORPHONE HYDROCHLORIDE 0.5 MG: 1 INJECTION, SOLUTION INTRAMUSCULAR; INTRAVENOUS; SUBCUTANEOUS at 10:12

## 2018-08-14 RX ADMIN — HYDROMORPHONE HYDROCHLORIDE 0.5 MG: 1 INJECTION, SOLUTION INTRAMUSCULAR; INTRAVENOUS; SUBCUTANEOUS at 19:23

## 2018-08-14 RX ADMIN — DEXTROAMPHETAMINE SACCHARATE, AMPHETAMINE ASPARTATE MONOHYDRATE, DEXTROAMPHETAMINE SULFATE, AND AMPHETAMINE SULFATE 30 MG: 2.5; 2.5; 2.5; 2.5 CAPSULE, EXTENDED RELEASE ORAL at 16:41

## 2018-08-14 RX ADMIN — SODIUM CHLORIDE, POTASSIUM CHLORIDE, SODIUM LACTATE AND CALCIUM CHLORIDE: 600; 310; 30; 20 INJECTION, SOLUTION INTRAVENOUS at 07:45

## 2018-08-14 RX ADMIN — HYDROMORPHONE HYDROCHLORIDE 0.5 MG: 1 INJECTION, SOLUTION INTRAMUSCULAR; INTRAVENOUS; SUBCUTANEOUS at 12:52

## 2018-08-14 RX ADMIN — CYCLOBENZAPRINE HYDROCHLORIDE 10 MG: 10 TABLET, FILM COATED ORAL at 07:28

## 2018-08-14 RX ADMIN — MIDAZOLAM HYDROCHLORIDE 5 MG: 5 INJECTION INTRAMUSCULAR; INTRAVENOUS at 07:50

## 2018-08-14 RX ADMIN — PROPOFOL 200 MG: 10 INJECTION, EMULSION INTRAVENOUS at 08:08

## 2018-08-14 RX ADMIN — GLYCOPYRROLATE 0.2 MG: 0.2 INJECTION, SOLUTION INTRAMUSCULAR; INTRAVENOUS at 08:37

## 2018-08-14 RX ADMIN — HYDROMORPHONE HYDROCHLORIDE 1 MG: 2 INJECTION, SOLUTION INTRAMUSCULAR; INTRAVENOUS; SUBCUTANEOUS at 09:48

## 2018-08-14 RX ADMIN — FENTANYL CITRATE 100 MCG: 50 INJECTION, SOLUTION INTRAMUSCULAR; INTRAVENOUS at 08:30

## 2018-08-14 ASSESSMENT — PULMONARY FUNCTION TESTS
PIF_VALUE: 20
PIF_VALUE: 0
PIF_VALUE: 15
PIF_VALUE: 1
PIF_VALUE: 20
PIF_VALUE: 25
PIF_VALUE: 23
PIF_VALUE: 24
PIF_VALUE: 25
PIF_VALUE: 25
PIF_VALUE: 24
PIF_VALUE: 25
PIF_VALUE: 24
PIF_VALUE: 24
PIF_VALUE: 26
PIF_VALUE: 25
PIF_VALUE: 25
PIF_VALUE: 24
PIF_VALUE: 25
PIF_VALUE: 24
PIF_VALUE: 25
PIF_VALUE: 1
PIF_VALUE: 25
PIF_VALUE: 24
PIF_VALUE: 18
PIF_VALUE: 24
PIF_VALUE: 23
PIF_VALUE: 24
PIF_VALUE: 25
PIF_VALUE: 23
PIF_VALUE: 24
PIF_VALUE: 25
PIF_VALUE: 23
PIF_VALUE: 25
PIF_VALUE: 0
PIF_VALUE: 24
PIF_VALUE: 24
PIF_VALUE: 22
PIF_VALUE: 25
PIF_VALUE: 24
PIF_VALUE: 25
PIF_VALUE: 24
PIF_VALUE: 24
PIF_VALUE: 23
PIF_VALUE: 24
PIF_VALUE: 24
PIF_VALUE: 22
PIF_VALUE: 25
PIF_VALUE: 0
PIF_VALUE: 25
PIF_VALUE: 26
PIF_VALUE: 23
PIF_VALUE: 25
PIF_VALUE: 23
PIF_VALUE: 23
PIF_VALUE: 25
PIF_VALUE: 17
PIF_VALUE: 24
PIF_VALUE: 3
PIF_VALUE: 24
PIF_VALUE: 25
PIF_VALUE: 24
PIF_VALUE: 23
PIF_VALUE: 15
PIF_VALUE: 24
PIF_VALUE: 25
PIF_VALUE: 4
PIF_VALUE: 22
PIF_VALUE: 24
PIF_VALUE: 25
PIF_VALUE: 23
PIF_VALUE: 19
PIF_VALUE: 24
PIF_VALUE: 25
PIF_VALUE: 0
PIF_VALUE: 25
PIF_VALUE: 23
PIF_VALUE: 23
PIF_VALUE: 25
PIF_VALUE: 24
PIF_VALUE: 25
PIF_VALUE: 2
PIF_VALUE: 24
PIF_VALUE: 3
PIF_VALUE: 0
PIF_VALUE: 25
PIF_VALUE: 24
PIF_VALUE: 25
PIF_VALUE: 0
PIF_VALUE: 24

## 2018-08-14 ASSESSMENT — PAIN SCALES - GENERAL
PAINLEVEL_OUTOF10: 6
PAINLEVEL_OUTOF10: 8
PAINLEVEL_OUTOF10: 7
PAINLEVEL_OUTOF10: 8
PAINLEVEL_OUTOF10: 7
PAINLEVEL_OUTOF10: 9
PAINLEVEL_OUTOF10: 7
PAINLEVEL_OUTOF10: 6
PAINLEVEL_OUTOF10: 8
PAINLEVEL_OUTOF10: 10
PAINLEVEL_OUTOF10: 8
PAINLEVEL_OUTOF10: 5

## 2018-08-14 ASSESSMENT — PAIN DESCRIPTION - ORIENTATION
ORIENTATION: LEFT;LOWER
ORIENTATION: LEFT
ORIENTATION: LOWER;LEFT
ORIENTATION: LOWER;LEFT

## 2018-08-14 ASSESSMENT — PAIN DESCRIPTION - LOCATION
LOCATION: BACK;KNEE
LOCATION: KNEE

## 2018-08-14 ASSESSMENT — PAIN - FUNCTIONAL ASSESSMENT: PAIN_FUNCTIONAL_ASSESSMENT: 0-10

## 2018-08-14 ASSESSMENT — PAIN DESCRIPTION - PAIN TYPE
TYPE: CHRONIC PAIN;SURGICAL PAIN
TYPE: ACUTE PAIN
TYPE: ACUTE PAIN;SURGICAL PAIN
TYPE: ACUTE PAIN;CHRONIC PAIN;SURGICAL PAIN

## 2018-08-14 ASSESSMENT — PAIN DESCRIPTION - DESCRIPTORS
DESCRIPTORS: ACHING
DESCRIPTORS: ACHING;BURNING;CONSTANT

## 2018-08-14 NOTE — PROGRESS NOTES
To pacu,resp easy,alert. Med for c/o pain. Report received. circ checks good to feet. Knee drsg dry. Xray completed.

## 2018-08-14 NOTE — ANESTHESIA PROCEDURE NOTES
Peripheral Block    Patient location during procedure: pre-op  Staffing  Anesthesiologist: Trina Bonilla, [address]  Performed: anesthesiologist   Preanesthetic Checklist  Completed: patient identified, site marked, surgical consent, pre-op evaluation, timeout performed, IV checked, risks and benefits discussed, monitors and equipment checked, anesthesia consent given, oxygen available and patient being monitored  Peripheral Block  Patient position: left lateral decubitus  Prep: ChloraPrep  Patient monitoring: cardiac monitor, continuous pulse ox, frequent blood pressure checks and IV access  Block type: iPacks  Laterality: left  Injection technique: single-shot  Procedures: ultrasound guided  Local infiltration: lidocaine  Infiltration strength: 1.5 %  Dose: 1.5 mL  Local infiltration: lidocaine  Needle  Needle gauge: 21 G  Needle length: 10 cm  Needle localization: ultrasound guidance  Assessment  Injection assessment: negative aspiration for heme, no paresthesia on injection and local visualized surrounding nerve on ultrasound  Paresthesia pain: none  Slow fractionated injection: yes  Hemodynamics: stable  Additional Notes  Immediately prior to procedure a \"time out\" was called to verify the correct patient, allergies, laterality, procedure and equipment. Time out performed with Lucy Omer RN  2Local Anesthetic: 0.25 %  Bupivacaine   Amount: 30 ml  in 5 ml increments after negative aspiration each time.         Reason for block: post-op pain management and at surgeon's request

## 2018-08-14 NOTE — BRIEF OP NOTE
Brief Postoperative Note  ______________________________________________________________    Patient: Andrea Dominique  YOB: 1970  MRN: [id number]  Date of Procedure: 8/14/2018    Pre-Op Diagnosis: OSTEOARTHRITIS OF LEFT KNEE    Post-Op Diagnosis: Same       Procedure(s):  LEFT TOTAL KNEE REPLACEMENT WITH PLATELET RICH PLASMA REYES & NEPHEW LEGION OX XLPE    Anesthesia: Anesthesia type not filed in the log. Surgeon(s):  Antonio Julian MD    Staff:  Surgical Assistant: Bessy Dunn Assistant: Jessica BECKER  Scrub Person First: Adithya Salgado     Estimated Blood Loss: * No values recorded between 8/14/2018  8:00 AM and 8/14/2018  9:41 AM * [id number]    Complications: None    Specimens:   ID Type Source Tests Collected by Time Destination   A : LEFT KNEE BONE AND TISSUE Specimen Joint, Knee SURGICAL PATHOLOGY Antonio Julian MD 8/14/2018 8067        Implants:    Implant Name Type Inv.  Item Serial No.  Lot No. LRB No. Used   IMPL KNEE TIB BASE NON PORS LT  5 Knee IMPL KNEE TIB BASE NON PORS LT SZ 5  SMITH  [id number] Left 1   IMPL KNEE PATELLA COMP RESURF 32MM Knee IMPL KNEE PATELLA COMP RESURF 32MM  [place]  [id number] Left 1   IMPL KNEE FEM COMP OXINIUM IVELISSE  6 Knee IMPL KNEE FEM COMP OXINIUM IVELISSE  6  SMITH  [id number] Left 1   IMPL KNEE INSERT ARTIC LEGION CR HI FLEX  5-6 9MM Knee IMPL KNEE INSERT ARTIC LEGION CR HI FLEX  5-6 9MM  REYES  [id number] Left 1   CEMENT BONE RALLY HV Cement CEMENT BONE RALLY HV  AMY  [id number] Left 1   CEMENT BONE RALLY HV Cement CEMENT BONE RALLY HV   AMY  [id number] Left 1         Drains:   Closed/Suction Drain Left Knee Bulb 10 [place] (Active)       Findings: OA lEft knee    ROSITA Robin  Date: 8/14/2018  Time: 9:50 AM

## 2018-08-14 NOTE — ANESTHESIA PRE PROCEDURE
Department of Anesthesiology  Preprocedure Note       Name:  Christ Morrissey   Age:  51 y.o.  :  1970                                          MRN:  [id number]         Date:  2018      Surgeon: Fco Osborne):  Amadna Mahajan MD    Procedure: Procedure(s):  LEFT TOTAL KNEE REPLACEMENT WITH PLATELET RICH PLASMA REYES & NEPHEW LEGION OX XLPE    Medications prior to admission:   Prior to Admission medications    Medication Sig Start Date End Date Taking? Authorizing Provider   MORPHINE SULFATE IR PO Take 15 mg by mouth 3 times daily   Yes Historical Provider, MD   XARELTO 20 MG TABS tablet TAKE 1 TABLET BY MOUTH EVERY 24 HOURS. PT NEEDS APPT BEFORE NEXT REFILL 18  Yes FEROZ Greenwood CNP   valACYclovir (VALTREX) 1 g tablet TAKE 2 TABLETS BY MOUTH EVERY 12 HOURS FOR 2 DAYS 18  Yes Erasto Silva MD   losartan-hydrochlorothiazide (HYZAAR) 50-12.5 MG per tablet TAKE 1 TABLET BY MOUTH DAILY 3/21/18  Yes FEROZ Naik - CNP   ondansetron (ZOFRAN ODT) 4 MG disintegrating tablet Take 2 tablets by mouth every 8 hours as needed for Nausea 3/8/18  Yes FEROZ Greenwood CNP   buPROPion (WELLBUTRIN XL) 300 MG extended release tablet TAKE (1) TABLET BY MOUTH ONCE DAILY 10/26/17  Yes Dedra Mullen MD   citalopram (CELEXA) 40 MG tablet TAKE (1) TABLET BY MOUTH ONCE DAILY 10/26/17  Yes Dedra Mullen MD   dextroamphetamine (DEXTROSTAT) 10 MG tablet Take 10 mg by mouth 2 times daily . Yes Historical Provider, MD   amphetamine-dextroamphetamine (ADDERALL XR) 30 MG extended release capsule Take 30 mg by mouth 2 times daily .    Yes Historical Provider, MD   furosemide (LASIX) 40 MG tablet Take 1 tablet by mouth daily  Patient taking differently: Take 40 mg by mouth as needed  16  Yes Param Varela MD       Current medications:    Current Facility-Administered Medications   Medication Dose Route Frequency Provider Last Rate Last Dose    celecoxib (CELEBREX) capsule 200 mg  200 mg Oral Once Merna Li MD        bupivacaine liposome (EXPAREL) 1.3 % injection 266 mg  20 mL Subcutaneous Once Florina David MD        lactated ringers infusion   Intravenous Continuous Jah De Dios MD        sodium chloride flush 0.9 % injection 10 mL  10 mL Intravenous 2 times per day Jah De Dios MD        sodium chloride flush 0.9 % injection 10 mL  10 mL Intravenous PRN Jah De Dios MD        lidocaine PF 1 % injection 1 mL  1 mL Intradermal Once PRN Jah De Dios MD         Facility-Administered Medications Ordered in Other Encounters   Medication Dose Route Frequency Provider Last Rate Last Dose    propofol injection    PRN Rosanna Deadwood, APRN - CRNA   200 mg at 08/14/18 0808    rocuronium (ZEMURON) injection    PRN Rosanna Deadwood, APRN - CRNA   50 mg at 08/14/18 8432       Allergies:     Allergies   Allergen Reactions    Rocephin [Ceftriaxone Sodium In Dextrose] Hives     ITCH    Lisinopril Other (See Comments)     coughing       Problem List:    Patient Active Problem List   Diagnosis Code    Pulmonary embolism ([facility]) I26.99    DVT (deep venous thrombosis) ([facility]) I82.409    Disc displacement, lumbar M51.26    Lumbar stenosis M48.061    Lumbar facet arthropathy ([facility]) M46.96    Opiate analgesic contract exists Z02.89    Venous insufficiency I87.2    Pain in joint, lower leg M25.569    Localized osteoarthrosis, lower leg M17.10    Cervical disc displacement M50.20    Acute diastolic congestive heart failure ([facility]) I50.31    Morbid obesity with BMI of 45.0-49.9, adult ([facility] E66.01, Z68.42    Lupus anticoagulant disorder ([facility] D68.62    LVH (left ventricular hypertrophy) I51.7    Tear of lateral meniscus of left knee S83.282A    Primary osteoarthritis of left knee M17.12    Hypertension, essential I10    Sleep apnea, obstructive G47.33    Anxiety F41.9    Arthritis M19.90    Fibromyalgia M79.7    Depression F32.9    Migraine G43.909    I-PACK block for post op analgesia. Questions answered. Willing to proceed.)  Induction: intravenous. Anesthetic plan and risks discussed with patient.                       Hina Dillard MD   8/14/2018

## 2018-08-14 NOTE — PROGRESS NOTES
guard assistance (forward to EOB)     Transfers  Sit to Stand: Contact guard assistance (Cues for hand placement)  Stand to sit: Contact guard assistance     Ambulation  Ambulation?: Yes  WB Status: WBAT LLE  Ambulation 1  Surface: level tile  Device: Rolling Walker  Assistance: Contact guard assistance  Quality of Gait: Cues for sequencing; 2 standing rest breaks due to left knee pain. Cues for upright posture and to maintain ROSALINO within RW. Lacks heel strike on left; mild unsteadiness with no overt LOB. Distance: 40 ft     Balance  Sitting - Static: Good  Sitting - Dynamic: Good  Standing - Static: Fair;+  Standing - Dynamic: Fair     Exercises  Straight Leg Raise: x 10 LLE with assist  Quad Sets: x 10 BLE  Heelslides: x 10 LLE  Gluteal Sets: x 10 BLE  Knee Short Arc Quad: x 10 LLE  Ankle Pumps: x 10 BLE     Assessment   Body structures, Functions, Activity limitations: Decreased functional mobility ; Decreased endurance;Decreased balance;Decreased strength;Decreased sensation  Assessment: Pt is 52 yo female who presents POD 0 s/p left TKA. Pt CGA for mobility with walker this date. Pt reports she does not have help at home and has many stairs to negotiate. Pt requesting to go to rehab at d/c. Will continue to assess for d/c recs pending progress.   Treatment Diagnosis: decreased (I) with mobility  Specific instructions for Next Treatment: progress gait, stair training  Prognosis: Good  Decision Making: Low Complexity  Patient Education: Role of PT, safety with mobility, POC, d/c recs, WBAT LLE, TKA protocol exercises; pt verbalized understanding  Barriers to Learning: none  REQUIRES PT FOLLOW UP: Yes  Activity Tolerance  Activity Tolerance: Patient Tolerated treatment well;Patient limited by pain  Activity Tolerance: SpO2 98% on room air post ambulation         Plan   Plan  Times per week: BID 7 days/wk  Times per day: Twice a day  Specific instructions for Next Treatment: progress gait, stair training  Current

## 2018-08-14 NOTE — CONSULTS
Hospital Medicine  Consult History & Physical        Chief Complaint:  Left knee OA    Date of Service: Pt seen/examined in consultation on 8/14/2018    History Of Present Illness:      51 y.o. female who we are asked to see/evaluate by Karyn Diaz MD for medical management. Patient was seen postoperatively, up in bed, comfortable. Denies any chest pain or shortness of breath. Past Medical History:        Diagnosis Date    ADHD (attention deficit hyperactivity disorder)     Adopted     Blood transfusion     GI BLEEDING-COUMADIN    CHF (congestive heart failure) (HCC)     Degenerative disc disease     DVT (deep venous thrombosis) (HCC)     Fibromyalgia     History of blood transfusion     Hx of blood clots 2004    PE and DVT    Hypertension     controlled lately    Hypertrophic cardiomyopathy ([address]     mild, echo 2016    Hypoglycemia 2012    PAST HISTORY    Lupus anticoagulant disorder ([address]     Narcolepsy     PE (pulmonary embolism)     Pneumonia     Pulmonary edema 03/2016    Rheumat[facility]     Sleep apnea     CPAP       Past Surgical History:        Procedure Laterality Date    CHOLECYSTECTOMY, LAPAROSCOPIC      FOOT SURGERY      bone spur  - rt foot    HYSTEROSCOPY  05/25/2017    D & C    KNEE ARTHROPLASTY Left 08/14/2018    KNEE ARTHROSCOPY Left 2006    MENISCUS    KNEE ARTHROSCOPY Left 09/09/2016    MENISCUS    SLEEVE GASTRECTOMY  11/29/2017    LAPAROSCOPIC SLEEVE GASTRECTOMY-ETHICON, LIVER BIOPSY    UMBILICAL HERNIA REPAIR  11/05/2012    LAPAROSCOPIC WITH PHYSIO MESH       Medications Prior to Admission:    Prior to Admission medications    Medication Sig Start Date End Date Taking? Authorizing Provider   MORPHINE SULFATE IR PO Take 15 mg by mouth 3 times daily   Yes Historical Provider, MD   XARELTO 20 MG TABS tablet TAKE 1 TABLET BY MOUTH EVERY 24 HOURS.  PT NEEDS APPT BEFORE NEXT REFILL 7/17/18  Yes FEROZ Tompkins - CNP   valACYclovir (VALTREX) 1 g essential [I10] 03/14/2017    Morbid obesity with BMI of 45.0-49.9, adult (HCC) [E66.01, Z68.42] 05/05/2016    Lupus anticoagulant disorder [facility]) [D68.62] 05/05/2016       PLAN:    Left knee osteoarthritis, status post TKR, admitted under orthopedic surgery service, pain management, wound care and therapy as per surgery. Hypertension, controlled with current regimen, continue to monitor. History of lupus anticoagulant disorder, history of VTE, patient is on Xarelto, continue same. Morbid Obesity complicating assessment and treatment. Placing patient at risk for multiple co-morbidities as well as early death and contributing to the patient's presentation. Counseled on weight loss.        DVT Prophylaxis: Xarelto  Diet: DIET GENERAL;  Code Status: Full Code    PT/OT Eval Status: Active and ongoing    Dispo - as per surgery    Thank you for the consultation, will follow up as needed    Maine Doe MD

## 2018-08-15 PROBLEM — Z96.652 STATUS POST TOTAL LEFT KNEE REPLACEMENT: Status: ACTIVE | Noted: 2018-08-15

## 2018-08-15 LAB
ANION GAP SERPL CALCULATED.3IONS-SCNC: 7 MMOL/L (ref 3–16)
BUN BLDV-MCNC: 12 MG/DL (ref 7–20)
CALCIUM SERPL-MCNC: 8.3 MG/DL (ref 8.3–10.6)
CHLORIDE BLD-SCNC: 104 MMOL/L (ref 99–110)
CO2: 27 MMOL/L (ref 21–32)
CREAT SERPL-MCNC: 0.8 MG/DL (ref 0.6–1.1)
GFR AFRICAN AMERICAN: >60
GFR NON-AFRICAN AMERICAN: >60
GLUCOSE BLD-MCNC: 102 MG/DL (ref 70–99)
HCT VFR BLD CALC: 31.8 % (ref 36–48)
HEMOGLOBIN: 11.2 G/DL (ref 12–16)
MCH RBC QN AUTO: 28.2 PG (ref 26–34)
MCHC RBC AUTO-ENTMCNC: 35.1 G/DL (ref 31–36)
MCV RBC AUTO: 80.3 FL (ref 80–100)
PDW BLD-RTO: 14.8 % (ref 12.4–15.4)
PLATELET # BLD: 234 K/UL (ref 135–450)
PMV BLD AUTO: 7.8 FL (ref 5–10.5)
POTASSIUM REFLEX MAGNESIUM: 3.9 MMOL/L (ref 3.5–5.1)
RBC # BLD: 3.96 M/UL (ref 4–5.2)
SODIUM BLD-SCNC: 138 MMOL/L (ref 136–145)
WBC # BLD: 8.3 K/UL (ref 4–11)

## 2018-08-15 PROCEDURE — 97110 THERAPEUTIC EXERCISES: CPT

## 2018-08-15 PROCEDURE — 85027 COMPLETE CBC AUTOMATED: CPT

## 2018-08-15 PROCEDURE — 6360000002 HC RX W HCPCS: Performed by: PHYSICIAN ASSISTANT

## 2018-08-15 PROCEDURE — 97116 GAIT TRAINING THERAPY: CPT

## 2018-08-15 PROCEDURE — 6370000000 HC RX 637 (ALT 250 FOR IP): Performed by: ANESTHESIOLOGY

## 2018-08-15 PROCEDURE — 2580000003 HC RX 258: Performed by: PHYSICIAN ASSISTANT

## 2018-08-15 PROCEDURE — 80048 BASIC METABOLIC PNL TOTAL CA: CPT

## 2018-08-15 PROCEDURE — 97535 SELF CARE MNGMENT TRAINING: CPT

## 2018-08-15 PROCEDURE — 6370000000 HC RX 637 (ALT 250 FOR IP): Performed by: ORTHOPAEDIC SURGERY

## 2018-08-15 PROCEDURE — 36415 COLL VENOUS BLD VENIPUNCTURE: CPT

## 2018-08-15 PROCEDURE — 1200000000 HC SEMI PRIVATE

## 2018-08-15 PROCEDURE — 6370000000 HC RX 637 (ALT 250 FOR IP): Performed by: PHYSICIAN ASSISTANT

## 2018-08-15 RX ADMIN — CITALOPRAM HYDROBROMIDE 40 MG: 20 TABLET ORAL at 20:40

## 2018-08-15 RX ADMIN — OXYCODONE HYDROCHLORIDE 10 MG: 5 TABLET ORAL at 22:34

## 2018-08-15 RX ADMIN — RIVAROXABAN 20 MG: 20 TABLET, FILM COATED ORAL at 08:07

## 2018-08-15 RX ADMIN — DEXTROAMPHETAMINE SACCHARATE, AMPHETAMINE ASPARTATE MONOHYDRATE, DEXTROAMPHETAMINE SULFATE, AND AMPHETAMINE SULFATE 30 MG: 2.5; 2.5; 2.5; 2.5 CAPSULE, EXTENDED RELEASE ORAL at 08:06

## 2018-08-15 RX ADMIN — SODIUM CHLORIDE, PRESERVATIVE FREE 10 ML: 5 INJECTION INTRAVENOUS at 08:09

## 2018-08-15 RX ADMIN — OXYCODONE HYDROCHLORIDE 10 MG: 5 TABLET ORAL at 02:45

## 2018-08-15 RX ADMIN — HYDROMORPHONE HYDROCHLORIDE 0.5 MG: 1 INJECTION, SOLUTION INTRAMUSCULAR; INTRAVENOUS; SUBCUTANEOUS at 15:26

## 2018-08-15 RX ADMIN — DEXTROAMPHETAMINE SACCHARATE, AMPHETAMINE ASPARTATE MONOHYDRATE, DEXTROAMPHETAMINE SULFATE, AND AMPHETAMINE SULFATE 30 MG: 2.5; 2.5; 2.5; 2.5 CAPSULE, EXTENDED RELEASE ORAL at 15:25

## 2018-08-15 RX ADMIN — HYDROMORPHONE HYDROCHLORIDE 0.5 MG: 1 INJECTION, SOLUTION INTRAMUSCULAR; INTRAVENOUS; SUBCUTANEOUS at 00:06

## 2018-08-15 RX ADMIN — HYDROCHLOROTHIAZIDE 12.5 MG: 12.5 CAPSULE ORAL at 08:08

## 2018-08-15 RX ADMIN — Medication 2 G: at 01:29

## 2018-08-15 RX ADMIN — LOSARTAN POTASSIUM 50 MG: 25 TABLET, FILM COATED ORAL at 08:09

## 2018-08-15 RX ADMIN — OXYCODONE HYDROCHLORIDE 10 MG: 5 TABLET ORAL at 18:28

## 2018-08-15 RX ADMIN — SODIUM CHLORIDE, PRESERVATIVE FREE 10 ML: 5 INJECTION INTRAVENOUS at 15:26

## 2018-08-15 RX ADMIN — OXYCODONE HYDROCHLORIDE 10 MG: 5 TABLET ORAL at 12:21

## 2018-08-15 RX ADMIN — SODIUM CHLORIDE, PRESERVATIVE FREE 10 ML: 5 INJECTION INTRAVENOUS at 20:40

## 2018-08-15 RX ADMIN — CELECOXIB 100 MG: 100 CAPSULE ORAL at 20:40

## 2018-08-15 RX ADMIN — BUPROPION HYDROCHLORIDE 300 MG: 150 TABLET, FILM COATED, EXTENDED RELEASE ORAL at 08:08

## 2018-08-15 RX ADMIN — DOCUSATE SODIUM 100 MG: 100 CAPSULE, LIQUID FILLED ORAL at 20:40

## 2018-08-15 RX ADMIN — DOCUSATE SODIUM 100 MG: 100 CAPSULE, LIQUID FILLED ORAL at 08:08

## 2018-08-15 RX ADMIN — OXYCODONE HYDROCHLORIDE 10 MG: 5 TABLET ORAL at 08:21

## 2018-08-15 RX ADMIN — HYDROMORPHONE HYDROCHLORIDE 0.5 MG: 1 INJECTION, SOLUTION INTRAMUSCULAR; INTRAVENOUS; SUBCUTANEOUS at 09:47

## 2018-08-15 RX ADMIN — HYDROMORPHONE HYDROCHLORIDE 0.5 MG: 1 INJECTION, SOLUTION INTRAMUSCULAR; INTRAVENOUS; SUBCUTANEOUS at 20:40

## 2018-08-15 RX ADMIN — CELECOXIB 100 MG: 100 CAPSULE ORAL at 08:07

## 2018-08-15 ASSESSMENT — PAIN DESCRIPTION - ORIENTATION
ORIENTATION: LEFT

## 2018-08-15 ASSESSMENT — PAIN SCALES - GENERAL
PAINLEVEL_OUTOF10: 5
PAINLEVEL_OUTOF10: 5
PAINLEVEL_OUTOF10: 4
PAINLEVEL_OUTOF10: 7
PAINLEVEL_OUTOF10: 7
PAINLEVEL_OUTOF10: 8
PAINLEVEL_OUTOF10: 7
PAINLEVEL_OUTOF10: 8
PAINLEVEL_OUTOF10: 7
PAINLEVEL_OUTOF10: 4
PAINLEVEL_OUTOF10: 8
PAINLEVEL_OUTOF10: 8
PAINLEVEL_OUTOF10: 5
PAINLEVEL_OUTOF10: 5
PAINLEVEL_OUTOF10: 4
PAINLEVEL_OUTOF10: 5
PAINLEVEL_OUTOF10: 9

## 2018-08-15 ASSESSMENT — PAIN DESCRIPTION - PAIN TYPE
TYPE: SURGICAL PAIN

## 2018-08-15 ASSESSMENT — PAIN DESCRIPTION - LOCATION
LOCATION: KNEE

## 2018-08-15 NOTE — PROGRESS NOTES
Physical Therapy  Facility/Department: [place] [address] - MED SURG/ORTHO  Daily Treatment Note  NAME: Gurwinder River  : 1970  MRN: [id number]    Date of Service: 8/15/2018    Discharge Recommendations:  Continue to assess pending progress        Patient Diagnosis(es): The encounter diagnosis was Osteoarthritis of left knee, unspecified osteoarthritis type. has a past medical history of ADHD (attention deficit hyperactivity disorder); Adopted; Blood transfusion; CHF (congestive heart failure) ([address] Degenerative disc disease; DVT (deep venous thrombosis) ([facility] [address] Fibromyalgia; History of blood transfusion; Hx of blood clots; Hypertension; Hypertrophic cardiomyopathy ([facility] [address] [facility] Hypoglycemia; Lupus anticoagulant disorder ([facility] [address] Narcolepsy; PE (pulmonary embolism); Pneumonia; Pulmonary edema; Rheumatoid arthritis ([facility] [address] and Sleep apnea. has a past surgical history that includes Cholecystectomy, laparoscopic; Knee arthroscopy (Left, ); Foot surgery; Umbilical hernia repair (2012); Knee arthroscopy (Left, 2016); hysteroscopy (2017); Sleeve Gastrectomy (2017); Knee Arthroplasty (Left, 2018); and pr total knee arthroplasty (Left, 2018).     Restrictions  Restrictions/Precautions  Restrictions/Precautions: General Precautions, Fall Risk, Weight Bearing  Required Braces or Orthoses?: Yes  Lower Extremity Weight Bearing Restrictions  Left Lower Extremity Weight Bearing: Weight Bearing As Tolerated  Required Braces or Orthoses  Left Lower Extremity Brace: Knee Brace  LLE Brace Type: Knee immobilizer     Subjective   General  Chart Reviewed: Yes  Response To Previous Treatment: Not applicable  Family / Caregiver Present: No  Referring Practitioner: ROSITA Eden; Dr. Nicola Gonzalez MD  Subjective  Subjective: Pt agreeable to therapy  Pain Screening  Patient Currently in Pain: Yes  Pain Assessment  Pain Assessment: 0-10  Pain Level: 4  Pain Type: Surgical pain  Pain Location: Knee  Pain Orientation: Left  Pain Intervention(s): Emotional support; Ambulation/Increased activity  Vital Signs  Patient Currently in Pain: Yes       Orientation  Orientation  Overall Orientation Status: Within Functional Limits     Objective   Bed mobility  Supine to Sit: Unable to assess  Sit to Supine: Minimal assistance;Contact guard assistance  Transfers  Sit to Stand: Contact guard assistance (puching up from both armrests, pt states she has no armrests on chairs in her home.  )  Stand to sit: Contact guard assistance  Ambulation  Ambulation?: Yes  WB Status: WBAT LLE  Ambulation 1  Surface: level tile  Device: Standard Walker  Assistance: Contact guard assistance;Stand by assistance  Distance: 80'  Stairs/Curb  Stairs?: Yes  Stairs  Rails: Left ascending  Device: No Device  Assistance: Contact guard assistance;Stand by assistance        Exercises  Straight Leg Raise: x 10 LLE with assist  Quad Sets: x 10 BLE  Heelslides: x 10 LLE  Gluteal Sets: x 10 BLE  Hip Abduction: X 10 LLE with A  Knee Long Arc Quad: X 10 LLE with  A  Ankle Pumps: X 15 BLE                        Assessment   Body structures, Functions, Activity limitations: Decreased functional mobility ; Decreased endurance;Decreased balance;Decreased strength;Decreased sensation  Treatment Diagnosis: decreased (I) with mobility  Specific instructions for Next Treatment: progress gait, stair training  Prognosis: Good  Patient Education: Role of PT, safety with mobility, POC, d/c recs, WBAT LLE, TKA protocol exercises; pt verbalized understanding  REQUIRES PT FOLLOW UP: Yes  Activity Tolerance  Activity Tolerance: Patient Tolerated treatment well;Patient limited by pain        Goals  Short term goals  Time Frame for Short term goals: 1 week  Short term goal 1: Pt will be indep with bed mobility. Short term goal 2: Pt will be mod I with transfers with LRAD. Short term goal 3: Pt will ambulate 100 ft with supervision and LRAD.   Short term goal 4: Pt will negotiate

## 2018-08-15 NOTE — PROGRESS NOTES
WBAT  4:  Continue Pain Control PRN, takes morphine IR 15 mg TID PTA. Resumed. 5:   D/c planning for SNF per patient request, states she does not have the assistance she needs at home and lives in a [address] [facility] home. DCP updated, will give list of facilities.       Trina Haas PA-C

## 2018-08-15 NOTE — OP NOTE
peripatellar region. Utilizing the Smash Bucket and [employer] protocol for the above-mentioned prosthesis,  the course of femoral and tibial cuts were made. Femoral cuts were  addressed first.  The distal cut first protocol was used with the patient  ultimately being sized for the aforementioned trial.  This gave excellent  fixation both in the anteroposterior and mediolateral dimensions. Attention was then drawn toward the tibia. The preliminary cut was made in  the tibia, removing approximately 2 mm of bone based upon the patient's  overall tibial anatomy. The trial tibia was sized and placed in the  aforementioned thickness and size as mentioned above. This gave excellent  position and range of motion after trial components were utilized. Any  further tissue balancing that was necessary was performed at this juncture. The patella was addressed. Approximately 10 mm of bone was removed from  the patella using an oscillating saw. The onlay patellar component was  sized and appropriately positioned. The knee was then taken through a  trial range of motion with excellent patellar tracking. At this point, copious irrigation was performed with the pulsatile lavage,  and the bone was prepped for cement. The permanent components were then  cemented into position. After the cement hardened, the knee was again  taken through a range of motion and gave the same excellent alignment and  stability. The permanent polyethylene prosthesis was applied to the tibia. The  pneumatic tourniquet was deflated at this point, and hemostasis was  obtained with Bovie electrocauterization. The wound was then closed in a  layered fashion. A Bard drain had been placed into the lateral gutter. The patient was stable and taken to the recovery room with a knee  immobilization device in place.         Ian Travis MD    D: 08/15/2018 12:19:47       T: 08/15/2018 16:12:58     [email or web address]  Job#: [id number]     Doc#: [id number]    CC:

## 2018-08-15 NOTE — PROGRESS NOTES
Occupational Therapy  Facility/Department: [place] [address] - MED SURG/ORTHO  Daily Treatment Note  NAME: Saray Bolton  : 1970  MRN: [id number]    Date of Service: 8/15/2018    Discharge Recommendations:  Home with assist PRN       Patient Diagnosis(es): The encounter diagnosis was Osteoarthritis of left knee, unspecified osteoarthritis type. has a past medical history of ADHD (attention deficit hyperactivity disorder); Adopted; Blood transfusion; CHF (congestive heart failure) ([address] Degenerative disc disease; DVT (deep venous thrombosis) ([address] Fibromyalgia; History of blood transfusion; Hx of blood clots; Hypertension; Hypertrophic cardiomyopathy ([address] Hypoglycemia; Lupus anticoagulant disorder ([facility] Narcolepsy; PE (pulmonary embolism); Pneumonia; Pulmonary edema; Rheumatoid arthritis ([address] and Sleep apnea. has a past surgical history that includes Cholecystectomy, laparoscopic; Knee arthroscopy (Left, ); Foot surgery; Umbilical hernia repair (2012); Knee arthroscopy (Left, 2016); hysteroscopy (2017); Sleeve Gastrectomy (2017); Knee Arthroplasty (Left, 2018); and pr total knee arthroplasty (Left, 2018).     Restrictions  Restrictions/Precautions  Restrictions/Precautions: General Precautions, Fall Risk, Weight Bearing  Required Braces or Orthoses?: Yes  Lower Extremity Weight Bearing Restrictions  Left Lower Extremity Weight Bearing: Weight Bearing As Tolerated  Required Braces or Orthoses  Left Lower Extremity Brace: Knee Brace  LLE Brace Type: Knee immobilizer     Subjective   General  Chart Reviewed: Yes  Patient assessed for rehabilitation services?: Yes  Response to previous treatment: Patient with no complaints from previous session  Family / Caregiver Present: No  Referring Practitioner: ROSITA Glass  Diagnosis: L knee OA, s/p LEFT TOTAL KNEE REPLACEMENT 18    Subjective  Subjective: Pt resting in bed, pleasant and agreeable to OT Safety Education & Training, Home Management Training    AM-PAC Score  AM-PAC Inpatient Daily Activity Raw Score: 21  AM-PAC Inpatient ADL T-Scale Score : 44.27  ADL Inpatient CMS 0-100% Score: 32.79  ADL Inpatient CMS G-Code Modifier : CJ    Goals  Short term goals  Time Frame for Short term goals: 1 week  Short term goal 1: Pt will complete functional transfers with Mod I   Short term goal 2: Pt will complete LE dressing with Min A (goal partially met for shorts, continue for socks/shoes 8/15/18)  Short term goal 3: Pt will perform 5 minutes dynamic standing activity with Supervision by 8/18/18 (goal met 8/15/18)  Patient Goals   Patient goals : \"to walk and be able to go to the bathroom without issue\"     Therapy Time   Individual Concurrent Group Co-treatment   Time In 0850         Time Out 0914         Minutes 1200 MICHI Bustos, EDDIE/GHULAM

## 2018-08-15 NOTE — PROGRESS NOTES
Physical Therapy  Facility/Department: [place] [address] - MED SURG/ORTHO  Daily Treatment Note  NAME: Noemi Marrero  : 1970  MRN: [id number]    Date of Service: 8/15/2018    Discharge Recommendations:  Home with assist PRN       Patient Diagnosis(es): The encounter diagnosis was Osteoarthritis of left knee, unspecified osteoarthritis type. has a past medical history of ADHD (attention deficit hyperactivity disorder); Adopted; Blood transfusion; CHF (congestive heart failure) ([address] Degenerative disc disease; DVT (deep venous thrombosis) ([facility] [address] Fibromyalgia; History of blood transfusion; Hx of blood clots; Hypertension; Hypertrophic cardiomyopathy ([facility] [address] Hypoglycemia; Lupus anticoagulant disorder ([facility] [address] Narcolepsy; PE (pulmonary embolism); Pneumonia; Pulmonary edema; Rheumatoid arthritis ([facility] [address] and Sleep apnea. has a past surgical history that includes Cholecystectomy, laparoscopic; Knee arthroscopy (Left, ); Foot surgery; Umbilical hernia repair (2012); Knee arthroscopy (Left, 2016); hysteroscopy (2017); Sleeve Gastrectomy (2017); Knee Arthroplasty (Left, 2018); and pr total knee arthroplasty (Left, 2018).     Restrictions  Restrictions/Precautions  Restrictions/Precautions: General Precautions, Fall Risk, Weight Bearing  Required Braces or Orthoses?: Yes  Lower Extremity Weight Bearing Restrictions  Left Lower Extremity Weight Bearing: Weight Bearing As Tolerated  Required Braces or Orthoses  Left Lower Extremity Brace: Knee Brace  LLE Brace Type: Knee immobilizer     Subjective   General  Chart Reviewed: Yes  Response To Previous Treatment: Not applicable  Family / Caregiver Present: No  Referring Practitioner: ROSITA Baumann; Dr. Alicia Alvarado MD  Subjective  Subjective: Pt agreeable to therapy  General Comment  Comments: Pt resting in bed on approach; RN cleared pt for therap  Pain Screening  Patient Currently in Pain: Yes  Pain Assessment  Pain Assessment: 0-10  Pain

## 2018-08-15 NOTE — PROGRESS NOTES
Hospitalist Progress Note      PCP: Vanessa Davis MD    Date of Admission: 8/14/2018    Chief Complaint: Left knee osteoarthritis    Hospital Course:      Subjective:   Patient is up in chair, comfortable, not in distress. Complaining of severe pain at operative site. No new event overnight noted. Medications:  Reviewed    Infusion Medications    sodium chloride 75 mL/hr at 08/14/18 1615     Scheduled Medications    celecoxib  100 mg Oral BID    amphetamine-dextroamphetamine  30 mg Oral BID    buPROPion  300 mg Oral Daily    citalopram  40 mg Oral Daily    sodium chloride flush  10 mL Intravenous 2 times per day    docusate sodium  100 mg Oral BID    losartan  50 mg Oral Daily    And    hydrochlorothiazide  12.5 mg Oral Daily    rivaroxaban  20 mg Oral Daily with breakfast     PRN Meds: cyclobenzaprine, dextroamphetamine, furosemide, morphine, ondansetron, sodium chloride flush, acetaminophen, ondansetron, HYDROmorphone **OR** HYDROmorphone, oxyCODONE **OR** oxyCODONE      Intake/Output Summary (Last 24 hours) at 08/15/18 1148  Last data filed at 08/15/18 0816   Gross per 24 hour   Intake             1245 ml   Output              650 ml   Net              595 ml       Physical Exam Performed:    /65   Pulse 55   Temp 98 °F (36.7 °C) (Oral)   Resp 16   Ht 5' 8\" (1.727 m)   Wt 260 lb (117.9 kg)   LMP 07/11/2018   SpO2 95%   BMI 39.53 kg/m²     General appearance: No apparent distress, appears stated age and cooperative. HEENT: Pupils equal, round, and reactive to light. Conjunctivae/corneas clear. Neck: Supple, with full range of motion. No jugular venous distention. Trachea midline. Respiratory:  Normal respiratory effort. Clear to auscultation, bilaterally without Rales/Wheezes/Rhonchi. Cardiovascular: Regular rate and rhythm with normal S1/S2 without murmurs, rubs or gallops. Abdomen: Soft, non-tender, non-distended with normal bowel sounds.   Musculoskeletal: No monitor.     History of lupus anticoagulant disorder, history of VTE, patient is on Xarelto, continue same.     Morbid Obesity complicating assessment and treatment. Placing patient at risk for multiple co-morbidities as well as early death and contributing to the patient's presentation. Counseled on weight loss.      DVT Prophylaxis: Xarelto  Diet: DIET GENERAL;  Code Status: Full Code    PT/OT Eval Status: Ordered    Dispo - as per surgery    Aaliyah Faith MD

## 2018-08-15 NOTE — CARE COORDINATION
Meet with patient and family at bedside, discussed role of nurse navigator and gave contact information. Reviewed reasons to call with questions or concerns, importance of icing, incentive spirometer, pain medication, and physical therapy. Will follow up with call to patient in a few days.     Deepthi Londono  Orthopedic Nurse Navigator  Phone number: [phone]

## 2018-08-16 ENCOUNTER — APPOINTMENT (OUTPATIENT)
Dept: PHYSICAL THERAPY | Age: 48
End: 2018-08-16
Payer: COMMERCIAL

## 2018-08-16 LAB
HCT VFR BLD CALC: 29.8 % (ref 36–48)
HEMOGLOBIN: 10.6 G/DL (ref 12–16)
MCH RBC QN AUTO: 29 PG (ref 26–34)
MCHC RBC AUTO-ENTMCNC: 35.7 G/DL (ref 31–36)
MCV RBC AUTO: 81.3 FL (ref 80–100)
PDW BLD-RTO: 14.6 % (ref 12.4–15.4)
PLATELET # BLD: 184 K/UL (ref 135–450)
PMV BLD AUTO: 7.7 FL (ref 5–10.5)
RBC # BLD: 3.66 M/UL (ref 4–5.2)
WBC # BLD: 5.9 K/UL (ref 4–11)

## 2018-08-16 PROCEDURE — 6360000002 HC RX W HCPCS: Performed by: PHYSICIAN ASSISTANT

## 2018-08-16 PROCEDURE — 36415 COLL VENOUS BLD VENIPUNCTURE: CPT

## 2018-08-16 PROCEDURE — 2580000003 HC RX 258: Performed by: PHYSICIAN ASSISTANT

## 2018-08-16 PROCEDURE — 6370000000 HC RX 637 (ALT 250 FOR IP): Performed by: ORTHOPAEDIC SURGERY

## 2018-08-16 PROCEDURE — 97530 THERAPEUTIC ACTIVITIES: CPT

## 2018-08-16 PROCEDURE — 6370000000 HC RX 637 (ALT 250 FOR IP): Performed by: PHYSICIAN ASSISTANT

## 2018-08-16 PROCEDURE — 97110 THERAPEUTIC EXERCISES: CPT

## 2018-08-16 PROCEDURE — 1200000000 HC SEMI PRIVATE

## 2018-08-16 PROCEDURE — 97535 SELF CARE MNGMENT TRAINING: CPT

## 2018-08-16 PROCEDURE — 6370000000 HC RX 637 (ALT 250 FOR IP): Performed by: ANESTHESIOLOGY

## 2018-08-16 PROCEDURE — 85027 COMPLETE CBC AUTOMATED: CPT

## 2018-08-16 PROCEDURE — 97116 GAIT TRAINING THERAPY: CPT

## 2018-08-16 RX ADMIN — HYDROMORPHONE HYDROCHLORIDE 0.5 MG: 1 INJECTION, SOLUTION INTRAMUSCULAR; INTRAVENOUS; SUBCUTANEOUS at 09:25

## 2018-08-16 RX ADMIN — CITALOPRAM HYDROBROMIDE 40 MG: 20 TABLET ORAL at 20:17

## 2018-08-16 RX ADMIN — OXYCODONE HYDROCHLORIDE 10 MG: 5 TABLET ORAL at 06:44

## 2018-08-16 RX ADMIN — SODIUM CHLORIDE, PRESERVATIVE FREE 10 ML: 5 INJECTION INTRAVENOUS at 00:40

## 2018-08-16 RX ADMIN — HYDROMORPHONE HYDROCHLORIDE 0.5 MG: 1 INJECTION, SOLUTION INTRAMUSCULAR; INTRAVENOUS; SUBCUTANEOUS at 18:19

## 2018-08-16 RX ADMIN — BUPROPION HYDROCHLORIDE 300 MG: 150 TABLET, FILM COATED, EXTENDED RELEASE ORAL at 08:54

## 2018-08-16 RX ADMIN — SODIUM CHLORIDE, PRESERVATIVE FREE 10 ML: 5 INJECTION INTRAVENOUS at 18:10

## 2018-08-16 RX ADMIN — DOCUSATE SODIUM 100 MG: 100 CAPSULE, LIQUID FILLED ORAL at 08:53

## 2018-08-16 RX ADMIN — OXYCODONE HYDROCHLORIDE 10 MG: 5 TABLET ORAL at 20:17

## 2018-08-16 RX ADMIN — SODIUM CHLORIDE, PRESERVATIVE FREE 10 ML: 5 INJECTION INTRAVENOUS at 09:25

## 2018-08-16 RX ADMIN — HYDROMORPHONE HYDROCHLORIDE 0.5 MG: 1 INJECTION, SOLUTION INTRAMUSCULAR; INTRAVENOUS; SUBCUTANEOUS at 00:40

## 2018-08-16 RX ADMIN — OXYCODONE HYDROCHLORIDE 10 MG: 5 TABLET ORAL at 11:45

## 2018-08-16 RX ADMIN — ONDANSETRON 8 MG: 4 TABLET, ORALLY DISINTEGRATING ORAL at 06:13

## 2018-08-16 RX ADMIN — DOCUSATE SODIUM 100 MG: 100 CAPSULE, LIQUID FILLED ORAL at 20:18

## 2018-08-16 RX ADMIN — OXYCODONE HYDROCHLORIDE 10 MG: 5 TABLET ORAL at 03:01

## 2018-08-16 RX ADMIN — CELECOXIB 100 MG: 100 CAPSULE ORAL at 20:17

## 2018-08-16 RX ADMIN — DEXTROAMPHETAMINE SACCHARATE, AMPHETAMINE ASPARTATE MONOHYDRATE, DEXTROAMPHETAMINE SULFATE, AND AMPHETAMINE SULFATE 30 MG: 2.5; 2.5; 2.5; 2.5 CAPSULE, EXTENDED RELEASE ORAL at 08:52

## 2018-08-16 RX ADMIN — RIVAROXABAN 20 MG: 20 TABLET, FILM COATED ORAL at 08:53

## 2018-08-16 RX ADMIN — CELECOXIB 100 MG: 100 CAPSULE ORAL at 08:54

## 2018-08-16 RX ADMIN — OXYCODONE HYDROCHLORIDE 10 MG: 5 TABLET ORAL at 15:52

## 2018-08-16 RX ADMIN — SODIUM CHLORIDE, PRESERVATIVE FREE 10 ML: 5 INJECTION INTRAVENOUS at 08:55

## 2018-08-16 ASSESSMENT — PAIN DESCRIPTION - ORIENTATION
ORIENTATION: LEFT

## 2018-08-16 ASSESSMENT — PAIN DESCRIPTION - LOCATION
LOCATION: KNEE

## 2018-08-16 ASSESSMENT — PAIN SCALES - GENERAL
PAINLEVEL_OUTOF10: 7
PAINLEVEL_OUTOF10: 8
PAINLEVEL_OUTOF10: 7
PAINLEVEL_OUTOF10: 7
PAINLEVEL_OUTOF10: 9
PAINLEVEL_OUTOF10: 8
PAINLEVEL_OUTOF10: 8
PAINLEVEL_OUTOF10: 6
PAINLEVEL_OUTOF10: 8
PAINLEVEL_OUTOF10: 9
PAINLEVEL_OUTOF10: 7

## 2018-08-16 ASSESSMENT — PAIN DESCRIPTION - PAIN TYPE
TYPE: SURGICAL PAIN

## 2018-08-16 ASSESSMENT — PAIN SCALES - WONG BAKER: WONGBAKER_NUMERICALRESPONSE: 4

## 2018-08-16 NOTE — PROGRESS NOTES
PA; Dr. Cy Mcduffie MD  Subjective  Subjective: Pt agreeable to therapy  Pain Screening  Patient Currently in Pain: Yes  Pain Assessment  Pain Assessment: 0-10  Pain Level: 7  Pain Type: Surgical pain  Pain Location: Knee  Pain Orientation: Left  Vital Signs  Patient Currently in Pain: Yes       Orientation  Orientation  Overall Orientation Status: Within Functional Limits  Objective   Bed mobility  Supine to Sit: Stand by assistance  Scooting: Stand by assistance  Transfers  Sit to Stand: Contact guard assistance  Stand to sit: Contact guard assistance  Ambulation  WB Status: WBAT LLE  Ambulation 1  Surface: level tile  Device: Rolling Walker  Assistance: Contact guard assistance;Stand by assistance  Quality of Gait: Cues for sequencing; 2 standing rest breaks due to left knee pain. Cues for upright posture and to maintain ROSALINO within RW. Lacks heel strike on left; mild unsteadiness with no overt LOB. Distance: 120'  Stairs/Curb  Stairs?: Yes  Stairs  # Steps : 3  Stairs Height: 6\"  Rails: Left ascending  Device: No Device  Assistance: Contact guard assistance;Stand by assistance     Balance  Sitting - Static: Good  Sitting - Dynamic: Good  Standing - Static: Fair;+  Standing - Dynamic: Fair  Exercises  Straight Leg Raise: x 10 LLE with assist  Heelslides: x2 min in chair   Gluteal Sets: 15x5 sec B   Knee Short Arc Quad: x 10 LLE with assist in chair   Ankle Pumps: x20 B      Assessment   Assessment: Pt limited due to pain and generalized deconditioning; pt c/o intermittent lightheadedness during gait training (/65). Presented with fair trunk and LE control during bed mobility with the  use of the handrails to get EOB: required use of UEs in order to move LLE to EOB. Transfers and gait training were mildly unsteady requiring sequencing cues throughout; no LOB noted. Managed stairs safely without cues.        Treatment Diagnosis: decreased (I) with mobility  Specific instructions for Next Treatment: progress gait, stair training  Prognosis: Good  Patient Education: Role of PT, safety with mobility, POC, d/c recs, WBAT LLE, TKA protocol exercises; pt verbalized understanding  REQUIRES PT FOLLOW UP: Yes     AM-PAC Score  AM-PAC Inpatient Mobility Raw Score : 18  AM-PAC Inpatient T-Scale Score : 43.63  Mobility Inpatient CMS 0-100% Score: 46.58  Mobility Inpatient CMS G-Code Modifier : CK          Goals  Short term goals  Time Frame for Short term goals: 1 week  Short term goal 1: Pt will be indep with bed mobility. Short term goal 2: Pt will be mod I with transfers with LRAD. Short term goal 3: Pt will ambulate 100 ft with supervision and LRAD. Short term goal 4: Pt will negotiate 10 stairs with HR and supervision. Short term goal 5: 8/15/18: Pt will be indep with TKA protocol exercises. Patient Goals   Patient goals : \"to go to rehab\"    Plan    Plan  Times per week: BID 7 days/wk  Times per day: Twice a day  Specific instructions for Next Treatment: progress gait, stair training  Current Treatment Recommendations: Strengthening, Neuromuscular Re-education, Home Exercise Program, ROM, Safety Education & Training, Balance Training, Endurance Training, Functional Mobility Training, Transfer Training, Gait Training, Stair training  Safety Devices  Type of devices:  All fall risk precautions in place, Call light within reach, Chair alarm in place, Gait belt, Left in chair, Nurse notified     Therapy Time   Individual Concurrent Group Co-treatment   Time In [phone]         Time Out 1040         Minutes 45         Timed Code Treatment Minutes: [facility] , PTA

## 2018-08-16 NOTE — PLAN OF CARE
Problem: Pain:  Goal: Control of acute pain  Control of acute pain   Outcome: Ongoing  Pt rates pain using 0-10 scale. Instructed pt to call with increasing pain or ineffective pain relief after taking pain medication. Pt verbalized understanding. Call light within reach, will continue to monitor.

## 2018-08-16 NOTE — PROGRESS NOTES
treatment. Pain Assessment  Patient Currently in Pain: Yes  Pain Assessment: Faces  Acosta-Baker Pain Rating: Hurts little more  Pain Type: Surgical pain  Pain Location: Knee  Pain Orientation: Left  Pain Intervention(s): Repositioned; Emotional support; Ambulation/Increased activity    Orientation  Orientation  Overall Orientation Status: Within Normal Limits     Objective    ADL  Grooming: Modified independent   LE Dressing: Independent (to wily/doff shoes and socks)  Toileting: Modified independent      Balance  Sitting Balance: Independent  Standing Balance: Modified independent   Standing Balance  Sit to stand: Modified independent  Stand to sit: Modified independent    Functional Mobility  Functional - Mobility Device: Standard Walker  Activity: To/from bathroom  Assist Level: Modified independent     Toilet Transfers  Toilet - Technique: Ambulating (with SW)  Equipment Used: Standard toilet  Toilet Transfer: Independent    Bed mobility  Supine to Sit: Independent  Sit to Supine: Independent     Transfers  Sit to stand: Modified independent  Stand to sit: Modified independent     Assessment    Assessment: Pt presents at Mod I-Ind for all functional mobility and transfers, and ADL observed this session.  Believe pt will be safe to return home at this time with PRN supervision/assist.   Prognosis: Good  Patient Education: bed mobility, functional transfers, functional mobility, ADLs, role of OT  REQUIRES OT FOLLOW UP: No  Activity Tolerance  Activity Tolerance: Patient Tolerated treatment well  Safety Devices  Safety Devices in place: Yes  Type of devices: Left in bed;Call light within reach;Nurse notified;Gait belt        Plan   Plan  Times per week: 3-6x/week   Current Treatment Recommendations: Balance Training, Functional Mobility Training, Endurance Training, Patient/Caregiver Education & Training, Equipment Evaluation, Education, & procurement, Self-Care / ADL, Safety Education & Training, Home Management

## 2018-08-16 NOTE — PROGRESS NOTES
patient at risk for multiple co-morbidities as well as early death and contributing to the patient's presentation. Counseled on weight loss.      DVT Prophylaxis: Xarelto  Diet: DIET GENERAL;  Code Status: Full Code    PT/OT Eval Status: Ordered    Dispo - as per surgery    Karley Montano MD

## 2018-08-16 NOTE — PROGRESS NOTES
Department of Orthopedic Surgery  Physician Assistant   Progress Note    Subjective:     Post-Operative Day: 2 Status Post left Total Knee Arthroplasty  Systemic or Specific Complaints:  More sore this am.  Awaiting therapy. No new issues overnight. Objective:     Patient Vitals for the past 24 hrs:   BP Temp Temp src Pulse Resp SpO2   08/16/18 0830 (!) 92/54 98.6 °F (37 °C) Oral 69 16 99 %   08/16/18 0307 107/63 98.5 °F (36.9 °C) Oral 59 16 99 %   08/16/18 0033 99/63 98.6 °F (37 °C) Oral 63 16 97 %   08/15/18 1519 97/75 98.5 °F (36.9 °C) Oral 77 18 93 %   08/15/18 1218 116/82 98.6 °F (37 °C) Axillary 58 16 98 %       General: alert, appears stated age, cooperative and no distress   Wound: Wound clean and dry no evidence of infection. Motion: Painful range of Motion   DVT Exam: No evidence of DVT seen on physical exam.       Knee swollen but thigh soft to palpation. Moving foot and ankle. Good distal pulses. Data Review  CBC:   Lab Results   Component Value Date    WBC 5.9 08/16/2018    RBC 3.66 08/16/2018    HGB 10.6 08/16/2018    HCT 29.8 08/16/2018     08/16/2018       Assessment:     Status Post left Total Knee Arthroplasty. Doing well postoperatively. Plan:      1: Continues current post-op course, IM following. Labs stable post op. Pt is s/p gastric sleeve and has lost 100 pounds recently. Vitals stable. 2:  Continue Deep venous thrombosis prophylaxis- resumed xarelto per home med. Hx of lupus anticoagulant disorder  3:  Continue physical therapy and OT, WBAT  4:  Continue Pain Control PRN, takes morphine IR 15 mg TID PTA. Resumed. 5:   D/c planning for SNF per patient request, states she does not have the assistance she needs at home and [facility] [address] [facility]. DCP updated, Kelsi Galvez has been requested.   Will need precert, discussed with patient      Raciel Acharya PA-C

## 2018-08-16 NOTE — PROGRESS NOTES
PA; Dr. Ottoniel Rabago MD  Subjective  Subjective: Pt agreeable to therapy  Pain Screening  Patient Currently in Pain: Yes  Pain Assessment  Pain Assessment: 0-10  Pain Level: 6  Pain Type: Surgical pain  Pain Location: Knee  Pain Orientation: Left  Vital Signs  Patient Currently in Pain: Yes       Orientation  Orientation  Overall Orientation Status: Within Functional Limits  Objective   Bed mobility  Supine to Sit: Stand by assistance  Scooting: Stand by assistance  Transfers  Sit to Stand: Contact guard assistance  Stand to sit: Contact guard assistance  Ambulation  WB Status: WBAT LLE  Ambulation 1  Surface: level tile  Device: Rolling Walker  Assistance: Contact guard assistance;Stand by assistance  Quality of Gait: slow antalgic gait pattern; decreased stride length and heel strike. Distance: 200'     Balance  Sitting - Static: Good  Sitting - Dynamic: Good  Standing - Static: Fair;+  Standing - Dynamic: Fair  Exercises  Straight Leg Raise: x 10 LLE with assist  Heelslides: x2 min in chair   Gluteal Sets: 15x5 sec B   Knee Short Arc Quad: x 10 LLE with assist  Ankle Pumps: x20 B    ROM: 4-82  Assessment   Assessment: Pt limited due to pain and generalized deconditioning. Presented with fair trunk and LE control during bed mobility with the  use of the handrails to get EOB. Transfers and gait training were mildly unsteady requiring sequencing cues throughout; no LOB noted. Pt was left in the chair to perform drop and dangle protocol.     Treatment Diagnosis: decreased (I) with mobility  Specific instructions for Next Treatment: progress gait, stair training  Prognosis: Good  Patient Education: Role of PT, safety with mobility, POC, d/c recs, WBAT LLE, TKA protocol exercises; pt verbalized understanding  REQUIRES PT FOLLOW UP: Yes     AM-PAC Score  AM-PAC Inpatient Mobility Raw Score : 18  AM-PAC Inpatient T-Scale Score : 43.63  Mobility Inpatient CMS 0-100% Score: 46.58  Mobility Inpatient CMS G-Code Modifier : CK

## 2018-08-17 VITALS
BODY MASS INDEX: 39.4 KG/M2 | HEART RATE: 83 BPM | HEIGHT: 68 IN | OXYGEN SATURATION: 98 % | WEIGHT: 260 LBS | TEMPERATURE: 98.7 F | RESPIRATION RATE: 16 BRPM | DIASTOLIC BLOOD PRESSURE: 69 MMHG | SYSTOLIC BLOOD PRESSURE: 125 MMHG

## 2018-08-17 LAB
HCT VFR BLD CALC: 28.2 % (ref 36–48)
HEMOGLOBIN: 9.8 G/DL (ref 12–16)
MCH RBC QN AUTO: 28.1 PG (ref 26–34)
MCHC RBC AUTO-ENTMCNC: 34.7 G/DL (ref 31–36)
MCV RBC AUTO: 81 FL (ref 80–100)
PDW BLD-RTO: 14.4 % (ref 12.4–15.4)
PLATELET # BLD: 169 K/UL (ref 135–450)
PMV BLD AUTO: 7.6 FL (ref 5–10.5)
RBC # BLD: 3.49 M/UL (ref 4–5.2)
WBC # BLD: 4 K/UL (ref 4–11)

## 2018-08-17 PROCEDURE — 6370000000 HC RX 637 (ALT 250 FOR IP): Performed by: PHYSICIAN ASSISTANT

## 2018-08-17 PROCEDURE — 6360000002 HC RX W HCPCS: Performed by: PHYSICIAN ASSISTANT

## 2018-08-17 PROCEDURE — 6370000000 HC RX 637 (ALT 250 FOR IP): Performed by: ANESTHESIOLOGY

## 2018-08-17 PROCEDURE — 36415 COLL VENOUS BLD VENIPUNCTURE: CPT

## 2018-08-17 PROCEDURE — 2580000003 HC RX 258: Performed by: PHYSICIAN ASSISTANT

## 2018-08-17 PROCEDURE — 85027 COMPLETE CBC AUTOMATED: CPT

## 2018-08-17 PROCEDURE — 6370000000 HC RX 637 (ALT 250 FOR IP): Performed by: ORTHOPAEDIC SURGERY

## 2018-08-17 PROCEDURE — 97116 GAIT TRAINING THERAPY: CPT

## 2018-08-17 PROCEDURE — 97110 THERAPEUTIC EXERCISES: CPT

## 2018-08-17 RX ORDER — MORPHINE SULFATE 15 MG/1
15 TABLET ORAL 3 TIMES DAILY
Qty: 10 TABLET | Refills: 0 | Status: SHIPPED | OUTPATIENT
Start: 2018-08-17 | End: 2018-08-24

## 2018-08-17 RX ORDER — OXYCODONE HYDROCHLORIDE 5 MG/1
TABLET ORAL
Qty: 10 TABLET | Refills: 0 | Status: SHIPPED | OUTPATIENT
Start: 2018-08-17 | End: 2018-08-20

## 2018-08-17 RX ADMIN — BUPROPION HYDROCHLORIDE 300 MG: 150 TABLET, FILM COATED, EXTENDED RELEASE ORAL at 08:43

## 2018-08-17 RX ADMIN — HYDROMORPHONE HYDROCHLORIDE 0.5 MG: 1 INJECTION, SOLUTION INTRAMUSCULAR; INTRAVENOUS; SUBCUTANEOUS at 12:58

## 2018-08-17 RX ADMIN — HYDROMORPHONE HYDROCHLORIDE 0.5 MG: 1 INJECTION, SOLUTION INTRAMUSCULAR; INTRAVENOUS; SUBCUTANEOUS at 07:27

## 2018-08-17 RX ADMIN — SODIUM CHLORIDE, PRESERVATIVE FREE 10 ML: 5 INJECTION INTRAVENOUS at 08:45

## 2018-08-17 RX ADMIN — OXYCODONE HYDROCHLORIDE 10 MG: 5 TABLET ORAL at 14:40

## 2018-08-17 RX ADMIN — CELECOXIB 100 MG: 100 CAPSULE ORAL at 08:43

## 2018-08-17 RX ADMIN — RIVAROXABAN 20 MG: 20 TABLET, FILM COATED ORAL at 08:43

## 2018-08-17 RX ADMIN — OXYCODONE HYDROCHLORIDE 10 MG: 5 TABLET ORAL at 03:29

## 2018-08-17 RX ADMIN — OXYCODONE HYDROCHLORIDE 5 MG: 5 TABLET ORAL at 09:44

## 2018-08-17 RX ADMIN — OXYCODONE HYDROCHLORIDE 5 MG: 5 TABLET ORAL at 09:46

## 2018-08-17 RX ADMIN — DOCUSATE SODIUM 100 MG: 100 CAPSULE, LIQUID FILLED ORAL at 08:44

## 2018-08-17 RX ADMIN — SODIUM CHLORIDE, PRESERVATIVE FREE 10 ML: 5 INJECTION INTRAVENOUS at 12:58

## 2018-08-17 RX ADMIN — SODIUM CHLORIDE, PRESERVATIVE FREE 10 ML: 5 INJECTION INTRAVENOUS at 07:27

## 2018-08-17 RX ADMIN — DEXTROAMPHETAMINE SACCHARATE, AMPHETAMINE ASPARTATE MONOHYDRATE, DEXTROAMPHETAMINE SULFATE, AND AMPHETAMINE SULFATE 30 MG: 2.5; 2.5; 2.5; 2.5 CAPSULE, EXTENDED RELEASE ORAL at 08:47

## 2018-08-17 ASSESSMENT — PAIN DESCRIPTION - ORIENTATION
ORIENTATION_2: LOWER
ORIENTATION: LEFT
ORIENTATION_2: LOWER
ORIENTATION: LOWER
ORIENTATION: LEFT
ORIENTATION_2: LOWER
ORIENTATION: LEFT

## 2018-08-17 ASSESSMENT — PAIN DESCRIPTION - LOCATION
LOCATION_2: BACK
LOCATION: KNEE
LOCATION_2: BACK
LOCATION: BACK
LOCATION: KNEE
LOCATION_2: BACK
LOCATION: KNEE
LOCATION_2: BACK
LOCATION_2: BACK
LOCATION: KNEE
LOCATION_2: BACK
LOCATION: KNEE
LOCATION_2: BACK
LOCATION: KNEE

## 2018-08-17 ASSESSMENT — PAIN SCALES - GENERAL
PAINLEVEL_OUTOF10: 9
PAINLEVEL_OUTOF10: 9
PAINLEVEL_OUTOF10: 7
PAINLEVEL_OUTOF10: 5
PAINLEVEL_OUTOF10: 8
PAINLEVEL_OUTOF10: 8
PAINLEVEL_OUTOF10: 6
PAINLEVEL_OUTOF10: 8
PAINLEVEL_OUTOF10: 8
PAINLEVEL_OUTOF10: 9
PAINLEVEL_OUTOF10: 8
PAINLEVEL_OUTOF10: 5

## 2018-08-17 ASSESSMENT — PAIN DESCRIPTION - DESCRIPTORS
DESCRIPTORS_2: ACHING
DESCRIPTORS: ACHING;CONSTANT;SHARP

## 2018-08-17 ASSESSMENT — PAIN DESCRIPTION - PAIN TYPE
TYPE_2: CHRONIC PAIN
TYPE: SURGICAL PAIN
TYPE_2: CHRONIC PAIN
TYPE: SURGICAL PAIN
TYPE_2: CHRONIC PAIN
TYPE_2: CHRONIC PAIN
TYPE: SURGICAL PAIN
TYPE_2: CHRONIC PAIN
TYPE: SURGICAL PAIN
TYPE: CHRONIC PAIN
TYPE_2: CHRONIC PAIN
TYPE_2: CHRONIC PAIN

## 2018-08-17 ASSESSMENT — PAIN DESCRIPTION - INTENSITY
RATING_2: 5
RATING_2: 8
RATING_2: 9
RATING_2: 8
RATING_2: 6
RATING_2: 9
RATING_2: 7

## 2018-08-17 ASSESSMENT — PAIN DESCRIPTION - FREQUENCY: FREQUENCY: CONTINUOUS

## 2018-08-17 ASSESSMENT — PAIN DESCRIPTION - DURATION: DURATION_2: CONTINUOUS

## 2018-08-17 NOTE — FLOWSHEET NOTE
TAMMIE drain d'c'd from Lt.leg per hospital policy. DSD applied over insertion site with 2x2 gauze and tegaderm. [address] d'c'd from [address]. FA per hospital policy. DSD applied over insertion site with 2x2 gauze and tegaderm. Tolerated well.

## 2018-08-17 NOTE — CARE COORDINATION
CASE MANAGEMENT DISCHARGE SUMMARY      [facility] to: [facility]    Precertification completed: Yes, per [facility] Exemption Notification (HENS) completed: Yes      Transportation:    Medical Transport/ time: 1500   Ambulance form completed: Yes    Notified:    Family: Pt and family   Facility/Agency: [facility] faxed to [facility]   RN: [employer] number for report to facility: [phone]    Note: Discharging nurse to complete [facility], reconcile AVS, and place final copy with patient's discharge packet. RN to ensure that written prescriptions for  Level II medications are sent with patient to the facility as per protocol.

## 2018-08-17 NOTE — PROGRESS NOTES
Assessment  Pain Assessment: 0-10  Pain Type: Chronic pain  Pain Location: Back (back pan worse than knee pain at this time)  Pain Orientation: Lower  Vital Signs  Patient Currently in Pain: Yes       Objective   Bed mobility  Supine to Sit: Modified independent  Sit to Supine: Unable to assess  Transfers  Sit to Stand: Supervision  Stand to sit: Supervision  Ambulation  Ambulation?: Yes  WB Status: WBAT LLE  Ambulation 1  Surface: level tile  Device: Standard Walker  Assistance: Stand by assistance  Distance: 140'  Stairs/Curb  Stairs?: No (declined  due to back pain )        Exercises  Heelslides: X 20 seated   Gluteal Sets: 15x5 sec B   Knee Long Arc Quad: X 12 with A for end range  Knee Active Range of Motion: AAROM 85*, limited by swelling and pain  Ankle Pumps: x20 B                         Assessment   Body structures, Functions, Activity limitations: Decreased functional mobility ; Decreased endurance;Decreased balance;Decreased strength;Decreased sensation  Treatment Diagnosis: decreased (I) with mobility  Specific instructions for Next Treatment: progress gait, stair training  Prognosis: Good  Patient Education: Role of PT, safety with mobility, POC, d/c recs, WBAT LLE, TKA protocol exercises; pt verbalized understanding  REQUIRES PT FOLLOW UP: Yes  Activity Tolerance  Activity Tolerance: Patient Tolerated treatment well;Patient limited by pain                   AM-PAC Score     AM-PAC Inpatient Mobility without Stair Climbing Raw Score : 17  AM-PAC Inpatient without Stair Climbing T-Scale Score : 48.47  Mobility Inpatient CMS 0-100% Score: 32.72  Mobility Inpatient without Stair CMS G-Code Modifier : CJ       Goals  Short term goals  Time Frame for Short term goals: 1 week  Short term goal 1: Pt will be indep with bed mobility. Short term goal 2: Pt will be mod I with transfers with LRAD. Short term goal 3: Pt will ambulate 100 ft with supervision and LRAD.   Short term goal 4: Pt will negotiate 10 stairs with HR and supervision. Short term goal 5: 8/15/18: Pt will be indep with TKA protocol exercises. Patient Goals   Patient goals : \"to go to rehab\"    Plan    Plan  Times per week: BID 7 days/wk  Times per day: Twice a day  Specific instructions for Next Treatment: progress gait, stair training  Current Treatment Recommendations: Strengthening, Neuromuscular Re-education, Home Exercise Program, ROM, Safety Education & Training, Balance Training, Endurance Training, Functional Mobility Training, Transfer Training, Gait Training, Stair training  Safety Devices  Type of devices:  All fall risk precautions in place, Call light within reach, Chair alarm in place, Gait belt, Left in chair, Nurse notified     Therapy Time   Individual Concurrent Group Co-treatment   Time In 0859         Time Out 0932         Minutes [address] [place] [address] [id number]

## 2018-08-17 NOTE — PROGRESS NOTES
Comment  Comments: Pt resting in bed on approach; RN cleared pt for therap  Pain Screening  Patient Currently in Pain: Yes  Pain Assessment  Pain Assessment: 0-10  Pain Level: 5  Pain Type: Surgical pain  Pain Location: Knee  Pain Orientation: Left  Pain Intervention(s): Emotional support; Ambulation/Increased activity  Vital Signs  Patient Currently in Pain: Yes       Objective   Bed mobility  Supine to Sit: Modified independent  Sit to Supine: Minimal assistance  Transfers  Sit to Stand: Supervision  Stand to sit: Supervision  Ambulation  Ambulation?: Yes  WB Status: WBAT LLE  Ambulation 1  Surface: level tile  Device: Standard Walker  Assistance: Stand by assistance  Distance: 140'  Stairs/Curb  Stairs?: Yes  Stairs  # Steps : 3  Rails: Left ascending  Device: No Device  Assistance: Supervision        Exercises  Straight Leg Raise: x 10 LLE with assist  Quad Sets: x 15 BLE  Heelslides: X 20 seated   Gluteal Sets: 15x5 sec B   Hip Abduction: X 10 LLE with A  Knee Long Arc Quad: X 12 with A for end range  Knee Active Range of Motion: AAROM 89*, limited by swelling and pain  Ankle Pumps: x20 B                         Assessment   Body structures, Functions, Activity limitations: Decreased functional mobility ; Decreased endurance;Decreased balance;Decreased strength;Decreased sensation  Treatment Diagnosis: decreased (I) with mobility  Specific instructions for Next Treatment: progress gait, stair training  Prognosis: Good  Patient Education: Role of PT, safety with mobility, POC, d/c recs, WBAT LLE, TKA protocol exercises; pt verbalized understanding  REQUIRES PT FOLLOW UP: Yes  Activity Tolerance  Activity Tolerance: Patient Tolerated treatment well;Patient limited by pain                      AM-PAC Score     AM-PAC Inpatient Mobility without Stair Climbing Raw Score : 17  AM-PAC Inpatient without Stair Climbing T-Scale Score : 48.47  Mobility Inpatient CMS 0-100% Score: 32.72  Mobility Inpatient without Stair CMS

## 2018-08-17 NOTE — CARE COORDINATION
Awaiting final d/c orders, Cari/PA aware and working on this.  time set for 1500, will cont coordinate plans.

## 2018-08-17 NOTE — PROGRESS NOTES
Hospitalist Progress Note      PCP: Satya Roberts MD    Date of Admission: 8/14/2018    Chief Complaint: Left knee osteoarthritis    Hospital Course:      Subjective:   Patient is up in chair, comfortable, not in distress. Complaining of severe pain at operative site. No new event overnight noted. Medications:  Reviewed    Infusion Medications    sodium chloride 75 mL/hr at 08/14/18 1615     Scheduled Medications    celecoxib  100 mg Oral BID    amphetamine-dextroamphetamine  30 mg Oral BID    buPROPion  300 mg Oral Daily    citalopram  40 mg Oral Daily    sodium chloride flush  10 mL Intravenous 2 times per day    docusate sodium  100 mg Oral BID    losartan  50 mg Oral Daily    And    hydrochlorothiazide  12.5 mg Oral Daily    rivaroxaban  20 mg Oral Daily with breakfast     PRN Meds: cyclobenzaprine, dextroamphetamine, furosemide, morphine, ondansetron, sodium chloride flush, acetaminophen, ondansetron, HYDROmorphone **OR** HYDROmorphone, oxyCODONE **OR** oxyCODONE      Intake/Output Summary (Last 24 hours) at 08/17/18 1348  Last data filed at 08/17/18 0945   Gross per 24 hour   Intake              840 ml   Output              200 ml   Net              640 ml       Physical Exam Performed:    /69   Pulse 83   Temp 98.7 °F (37.1 °C) (Oral)   Resp 16   Ht 5' 8\" (1.727 m)   Wt 260 lb (117.9 kg)   LMP 07/11/2018   SpO2 98%   BMI 39.53 kg/m²     General appearance: No apparent distress, appears stated age and cooperative. HEENT: Pupils equal, round, and reactive to light. Conjunctivae/corneas clear. Neck: Supple, with full range of motion. No jugular venous distention. Trachea midline. Respiratory:  Normal respiratory effort. Clear to auscultation, bilaterally without Rales/Wheezes/Rhonchi. Cardiovascular: Regular rate and rhythm with normal S1/S2 without murmurs, rubs or gallops. Abdomen: Soft, non-tender, non-distended with normal bowel sounds.   Musculoskeletal: No Placing patient at risk for multiple co-morbidities as well as early death and contributing to the patient's presentation. Counseled on weight loss.      DVT Prophylaxis: Xarelto  Diet: DIET GENERAL;  Code Status: Full Code    PT/OT Eval Status: Ordered    Dispo - as per surgery    aRh Mckeon MD

## 2018-08-21 ENCOUNTER — APPOINTMENT (OUTPATIENT)
Dept: PHYSICAL THERAPY | Age: 48
End: 2018-08-21
Payer: COMMERCIAL

## 2018-08-21 RX ORDER — RIVAROXABAN 20 MG/1
TABLET, FILM COATED ORAL
Qty: 30 TABLET | Refills: 0 | Status: SHIPPED | OUTPATIENT
Start: 2018-08-21 | End: 2018-09-28 | Stop reason: SDUPTHER

## 2018-08-22 ENCOUNTER — TELEPHONE (OUTPATIENT)
Dept: ORTHOPEDIC SURGERY | Age: 48
End: 2018-08-22

## 2018-08-23 ENCOUNTER — APPOINTMENT (OUTPATIENT)
Dept: PHYSICAL THERAPY | Age: 48
End: 2018-08-23
Payer: COMMERCIAL

## 2018-08-28 ENCOUNTER — TELEPHONE (OUTPATIENT)
Dept: ORTHOPEDIC SURGERY | Age: 48
End: 2018-08-28

## 2018-08-28 ENCOUNTER — APPOINTMENT (OUTPATIENT)
Dept: PHYSICAL THERAPY | Age: 48
End: 2018-08-28
Payer: COMMERCIAL

## 2018-08-29 ENCOUNTER — OFFICE VISIT (OUTPATIENT)
Dept: ORTHOPEDIC SURGERY | Age: 48
End: 2018-08-29

## 2018-08-29 VITALS — BODY MASS INDEX: 44.42 KG/M2 | HEIGHT: 67 IN | WEIGHT: 283 LBS

## 2018-08-29 DIAGNOSIS — M25.562 LEFT KNEE PAIN, UNSPECIFIED CHRONICITY: Primary | ICD-10-CM

## 2018-08-29 DIAGNOSIS — Z96.652 STATUS POST TOTAL LEFT KNEE REPLACEMENT: ICD-10-CM

## 2018-08-29 DIAGNOSIS — E66.01 MORBID OBESITY DUE TO EXCESS CALORIES (HCC): ICD-10-CM

## 2018-08-29 PROCEDURE — 99024 POSTOP FOLLOW-UP VISIT: CPT | Performed by: ORTHOPAEDIC SURGERY

## 2018-08-29 RX ORDER — CYCLOBENZAPRINE HCL 10 MG
10 TABLET ORAL 3 TIMES DAILY PRN
Qty: 42 TABLET | Refills: 0 | Status: SHIPPED | OUTPATIENT
Start: 2018-08-29 | End: 2018-09-08

## 2018-08-29 NOTE — PROGRESS NOTES
with night pain and difficulty. We'll send in some Flexeril for her. She can also have of these last 2 nights at the [facility]. 3.  Follow-up in 3 weeks. She does not need x-rays. I have personally performed and/or participated in the history, exam and medical decision making and agree with all pertinent clinical information. I have also reviewed and agree with the past medical, family and social history unless otherwise noted. This dictation was performed with a verbal recognition program (DRAGON) and it was checked for errors. It is possible that there are still dictated errors within this office note. If so, please bring any errors to my attention for an addendum. All efforts were made to ensure that this office note is accurate.           Denny Gutiérrez MD

## 2018-09-05 ENCOUNTER — OFFICE VISIT (OUTPATIENT)
Dept: FAMILY MEDICINE CLINIC | Age: 48
End: 2018-09-05

## 2018-09-05 VITALS
OXYGEN SATURATION: 98 % | BODY MASS INDEX: 40.02 KG/M2 | WEIGHT: 255 LBS | HEIGHT: 67 IN | SYSTOLIC BLOOD PRESSURE: 120 MMHG | HEART RATE: 93 BPM | DIASTOLIC BLOOD PRESSURE: 70 MMHG

## 2018-09-05 DIAGNOSIS — M25.562 ACUTE PAIN OF LEFT KNEE: ICD-10-CM

## 2018-09-05 DIAGNOSIS — Z96.652 STATUS POST TOTAL LEFT KNEE REPLACEMENT: Primary | ICD-10-CM

## 2018-09-05 DIAGNOSIS — D68.62 LUPUS ANTICOAGULANT DISORDER (HCC): ICD-10-CM

## 2018-09-05 PROCEDURE — 99212 OFFICE O/P EST SF 10 MIN: CPT | Performed by: PHYSICIAN ASSISTANT

## 2018-09-05 PROCEDURE — 1111F DSCHRG MED/CURRENT MED MERGE: CPT | Performed by: PHYSICIAN ASSISTANT

## 2018-09-05 PROCEDURE — G8427 DOCREV CUR MEDS BY ELIG CLIN: HCPCS | Performed by: PHYSICIAN ASSISTANT

## 2018-09-05 PROCEDURE — G8417 CALC BMI ABV UP PARAM F/U: HCPCS | Performed by: PHYSICIAN ASSISTANT

## 2018-09-05 PROCEDURE — 1036F TOBACCO NON-USER: CPT | Performed by: PHYSICIAN ASSISTANT

## 2018-09-05 ASSESSMENT — ENCOUNTER SYMPTOMS
DIARRHEA: 0
VOMITING: 0
NAUSEA: 0
CONSTIPATION: 0
ABDOMINAL PAIN: 0
RHINORRHEA: 0
SHORTNESS OF BREATH: 0
SORE THROAT: 0
COUGH: 0

## 2018-09-06 ENCOUNTER — TELEPHONE (OUTPATIENT)
Dept: ORTHOPEDIC SURGERY | Age: 48
End: 2018-09-06

## 2018-09-10 ENCOUNTER — HOSPITAL ENCOUNTER (OUTPATIENT)
Dept: PHYSICAL THERAPY | Age: 48
Setting detail: THERAPIES SERIES
Discharge: HOME | End: 2018-09-10
Payer: COMMERCIAL

## 2018-09-10 DIAGNOSIS — Z96.659 STATUS POST TOTAL KNEE REPLACEMENT, UNSPECIFIED LATERALITY: Primary | ICD-10-CM

## 2018-09-10 PROCEDURE — 97140 MANUAL THERAPY 1/> REGIONS: CPT | Performed by: PHYSICAL THERAPIST

## 2018-09-10 PROCEDURE — G8978 MOBILITY CURRENT STATUS: HCPCS | Performed by: PHYSICAL THERAPIST

## 2018-09-10 PROCEDURE — G8979 MOBILITY GOAL STATUS: HCPCS | Performed by: PHYSICAL THERAPIST

## 2018-09-10 PROCEDURE — G0283 ELEC STIM OTHER THAN WOUND: HCPCS | Performed by: PHYSICAL THERAPIST

## 2018-09-10 PROCEDURE — 97110 THERAPEUTIC EXERCISES: CPT | Performed by: PHYSICAL THERAPIST

## 2018-09-10 PROCEDURE — 97161 PT EVAL LOW COMPLEX 20 MIN: CPT | Performed by: PHYSICAL THERAPIST

## 2018-09-10 RX ORDER — OXYCODONE HYDROCHLORIDE 5 MG/1
5 TABLET ORAL
Qty: 40 TABLET | Refills: 0 | Status: SHIPPED | OUTPATIENT
Start: 2018-09-10 | End: 2018-09-26 | Stop reason: SDUPTHER

## 2018-09-10 NOTE — PLAN OF CARE
Orthopedic Special Tests: Negative Hunter's                            [x] Patient history, allergies, meds reviewed. Medical chart reviewed. See intake form. Review Of Systems (ROS):  [x]Performed Review of systems (Integumentary, CardioPulmonary, Neurological) by intake and observation. Intake form has been scanned into medical record. Patient has been instructed to contact their primary care physician regarding ROS issues if not already being addressed at this time. Co-morbidities/Complexities (which will affect course of rehabilitation):   []None           Arthritic conditions   [x]Rheumatoid arthritis (M05.9)  [x]Osteoarthritis (M19.91)   Cardiovascular conditions   [x]Hypertension (I10)  []Hyperlipidemia (E78.5)  []Angina pectoris (I20)  []Atherosclerosis (I70)   Musculoskeletal conditions   []Disc pathology   []Congenital spine pathologies   []Prior surgical intervention  []Osteoporosis (M81.8)  []Osteopenia (M85.8)   Endocrine conditions   []Hypothyroid (E03.9)  []Hyperthyroid Gastrointestinal conditions   []Constipation (Q55.25)   Metabolic conditions   []Morbid obesity (E66.01)  []Diabetes type 1(E10.65) or 2 (E11.65)   []Neuropathy (G60.9)     Pulmonary conditions   []Asthma (J45)  []Coughing   []COPD (J44.9)   Psychological Disorders  [x]Anxiety (F41.9)  [x]Depression (F32.9)   []Other:   []Other:          Barriers to/and or personal factors that will affect rehab potential:              []Age  []Sex              []Motivation/Lack of Motivation                        [x]Co-Morbidities              []Cognitive Function, education/learning barriers              []Environmental, home barriers              []profession/work barriers  []past PT/medical experience  []other:  Justification: Should progress well. Falls Risk Assessment (30 days):   [x] Falls Risk assessed and no intervention required.   [] Falls Risk assessed and Patient requires intervention due to being higher risk   TUG score (>12s at risk):     [] Falls education provided, including       G-Codes:  PT G-Codes  Functional Assessment Tool Used: LEFS  Score: 81  Functional Limitation: Mobility: Walking and moving around  Mobility: Walking and Moving Around Current Status (): At least 80 percent but less than 100 percent impaired, limited or restricted  Mobility: Walking and Moving Around Goal Status (): At least 20 percent but less than 40 percent impaired, limited or restricted    ASSESSMENT:   Functional Impairments:     []Noted lumbar/proximal hip/LE joint hypomobility   [x]Decreased LE functional ROM   [x]Decreased core/proximal hip strength and neuromuscular control   [x]Decreased LE functional strength   [x]Reduced balance/proprioceptive control   []other:      Functional Activity Limitations (from functional questionnaire and intake)   [x]Reduced ability to tolerate prolonged functional positions   [x]Reduced ability or difficulty with changes of positions or transfers between positions   [x]Reduced ability to maintain good posture and demonstrate good body mechanics with sitting, bending, and lifting   [x]Reduced ability to sleep   [] Reduced ability or tolerance with driving and/or computer work   [x]Reduced ability to perform lifting, carrying tasks   [x]Reduced ability to squat   []Reduced ability to forward bend   [x]Reduced ability to ambulate prolonged functional periods/distances/surfaces   [x]Reduced ability to ascend/descend stairs   []Reduced ability to run, hop, cut or jump   []other:    Participation Restrictions   []Reduced participation in self care activities   [x]Reduced participation in home management activities   [x]Reduced participation in work activities   [x]Reduced participation in social activities. []Reduced participation in sport/recreation activities. Classification :    [x]Signs/symptoms consistent with post-surgical status including decreased ROM, strength and function.    []Signs/symptoms

## 2018-09-10 NOTE — FLOWSHEET NOTE
[facility] [facility], [address] [place]  Phone: [phone]  Fax [phone]    Physical Therapy Daily Treatment Note  Date:  9/10/2018    Patient Name:  Iwona Amezcua    :  1970  MRN: [id number]  Restrictions/Precautions:    Physician Information:  Referring Practitioner: Dr. Yoana Mendoza  Medical/Treatment Diagnosis Information:  · Diagnosis: Left knee pain  M25.562   s/p Left TKR   DOS:  18  · Treatment Diagnosis: Left knee pain  M25.562  [] Conservative / [x] Surgical - DOS: 18  Therapy Diagnosis/Practice Pattern:  Practice Pattern H: Joint Arthroplasty  Insurance/Certification information:  PT Insurance Information: [address] of care signed: [] YES  [] NO  Number of Comorbidities:  []0     []1-2    [x]3+  Date of Patient follow up with Physician:  18    G-Code (if applicable):      Date G-Code Applied:   9/10/18  PT G-Codes  Functional Assessment Tool Used: LEFS  Score: 81  Functional Limitation: Mobility: Walking and moving around  Mobility: Walking and Moving Around Current Status (): At least 80 percent but less than 100 percent impaired, limited or restricted  Mobility: Walking and Moving Around Goal Status ():  At least 20 percent but less than 40 percent impaired, limited or restricted    Progress Note: [x]  Yes  []  No  Next due by: Visit #10        Latex Allergy:  [x]NO      []YES  Preferred Language for Healthcare:   [x]English       []other:    Visit # Insurance Allowable Reporting Period     ( 5 used)  Begin Date: 9/10/2018               End Date:      RECERT DUE BY:     SUBJECTIVE:  See eval    OBJECTIVE: See eval  Observation:  Palpation:     Test used Initial score Current Score   Pain Summary VAS 8    Functional questionnaire LEFS 81    ROM flexion 100     extension Lacking 8     Strength Quad fair     slr  indep with lag               RESTRICTIONS/PRECAUTIONS: HBP, OA, Patient will return to ascending and descending stairs with symmetrical, reciprocal gait without increased symptoms or restriction. 5. Able to sleep 7 -9 hours    New or Updated Goals (if applicable):  [x] No change to goals established upon initial eval/last progress note:  New Goals:    Progression Towards Functional goals:   [] Patient is progressing as expected towards functional goals listed. [] Progression is slowed due to complexities listed. [] Progression has been slowed due to co-morbidities.   [x] Plan just implemented, too soon to assess goals progression  [] Other:     ASSESSMENT:    [] Improvement noted relative to goals:  [] No Improvement noted related to goals:  Summary/Patient's response to treatment: See Eval    Treatment/Activity Tolerance:  [x] Patient tolerated treatment well [] Patient limited by fatique  [] Patient limited by pain  [] Patient limited by other medical complications  [] Other:     Prognosis: [x] Good [] Fair  [] Poor    Patient Requires Follow-up: [x] Yes  [] No    PLAN: See eval  [] Continue per plan of care [] Alter current plan (see comments)  [x] Plan of care initiated [] Hold pending MD visit [] Discharge    Electronically signed by: Sara Cook, PT

## 2018-09-12 ENCOUNTER — HOSPITAL ENCOUNTER (OUTPATIENT)
Dept: PHYSICAL THERAPY | Age: 48
Setting detail: THERAPIES SERIES
Discharge: HOME | End: 2018-09-12
Payer: COMMERCIAL

## 2018-09-12 PROCEDURE — 97140 MANUAL THERAPY 1/> REGIONS: CPT | Performed by: PHYSICAL THERAPIST

## 2018-09-12 PROCEDURE — 97110 THERAPEUTIC EXERCISES: CPT | Performed by: PHYSICAL THERAPIST

## 2018-09-12 NOTE — FLOWSHEET NOTE
Summary VAS 8 8   Functional questionnaire LEFS 81    ROM flexion 100 103    extension Lacking 8     Strength Quad fair     slr  indep with lag               RESTRICTIONS/PRECAUTIONS: HBP, OA, RA, anxiety/ depression  No Pacemaker/   No history of CA. Exercises/Interventions:     Therapeutic Ex Sets/reps Notes   Bike 8 min ROM. Unable to go around. Heel prop 2 x 1 min hep   QS X 10  :10 hep   SAQ 3 x 10     Supine heel slides on SB with rope X 15     SLR 2 x 10 hep   SL hip abd 2 x 10     Seated HS s 5x  :30 hep   Seated towel s 5x  :30 hep   Seated towel knee flex X 15 hep   HR/ TL X 20   hep   Mini squats X 20   hep   LAQ 2 x 10                                   Manual Intervention     PROM /  Pat mobs. 8 min    Man gastroc/  Man HS s 3 min. NMR re-education                        Therapeutic Exercise and NMR EXR  [x] (17277) Provided verbal/tactile cueing for activities related to strengthening, flexibility, endurance, ROM for improvements in LE, proximal hip, and core control with self care, mobility, lifting, ambulation.  [] (89948) Provided verbal/tactile cueing for activities related to improving balance, coordination, kinesthetic sense, posture, motor skill, proprioception  to assist with LE, proximal hip, and core control in self care, mobility, lifting, ambulation and eccentric single leg control.      NMR and Therapeutic Activities:    [x] (52041 or 87558) Provided verbal/tactile cueing for activities related to improving balance, coordination, kinesthetic sense, posture, motor skill, proprioception and motor activation to allow for proper function of core, proximal hip and LE with self care and ADLs  [] (65538) Gait Re-education- Provided training and instruction to the patient for proper LE, core and proximal hip recruitment and positioning and eccentric body weight control with ambulation re-education including up and down stairs     Home Exercise Program:    [x] demonstrate increased AROM of left knee from 0 - 125 to allow for proper joint functioning as indicated by patients Functional Deficits. 3. Patient will demonstrate an increase in Strength to good proximal hip strength and control, within 5lb HHD in LE to allow for proper functional mobility as indicated by patients Functional Deficits. 4. Patient will return to ascending and descending stairs with symmetrical, reciprocal gait without increased symptoms or restriction. 5. Able to sleep 7 -9 hours    New or Updated Goals (if applicable):  [x] No change to goals established upon initial eval/last progress note:  New Goals:    Progression Towards Functional goals:   [] Patient is progressing as expected towards functional goals listed. [] Progression is slowed due to complexities listed. [] Progression has been slowed due to co-morbidities.   [x] Plan just implemented, too soon to assess goals progression  [] Other:     ASSESSMENT:    [] Improvement noted relative to goals:  [] No Improvement noted related to goals:  Summary/Patient's response to treatment: See Eval    Treatment/Activity Tolerance:  [x] Patient tolerated treatment well [] Patient limited by fatique  [] Patient limited by pain  [] Patient limited by other medical complications  [] Other:     Prognosis: [x] Good [] Fair  [] Poor    Patient Requires Follow-up: [x] Yes  [] No    PLAN: See eval  [x] Continue per plan of care [] Alter current plan (see comments)  [] Plan of care initiated [] Hold pending MD visit [] Discharge    Electronically signed by: Oneil Brandon PT

## 2018-09-17 ENCOUNTER — HOSPITAL ENCOUNTER (OUTPATIENT)
Dept: PHYSICAL THERAPY | Age: 48
Setting detail: THERAPIES SERIES
Discharge: HOME | End: 2018-09-17
Payer: COMMERCIAL

## 2018-09-17 PROCEDURE — 97110 THERAPEUTIC EXERCISES: CPT | Performed by: PHYSICAL THERAPIST

## 2018-09-17 PROCEDURE — 97140 MANUAL THERAPY 1/> REGIONS: CPT | Performed by: PHYSICAL THERAPIST

## 2018-09-17 NOTE — FLOWSHEET NOTE
[facility], [address] [place]  Phone: [phone]  Fax [phone]    Physical Therapy Daily Treatment Note  Date:  2018    Patient Name:  Noemi Marrero    :  1970  MRN: [id number]  Restrictions/Precautions:    Physician Information:  Referring Practitioner: Dr. Cristiana Patel  Medical/Treatment Diagnosis Information:  · Diagnosis: Left knee pain  M25.562   s/p Left TKR   DOS:  18  · Treatment Diagnosis: Left knee pain  M25.562  [] Conservative / [x] Surgical - DOS: 18  Therapy Diagnosis/Practice Pattern:  Practice Pattern H: Joint Arthroplasty  Insurance/Certification information:  PT Insurance Information: [id number] of care signed: [] YES  [] NO  Number of Comorbidities:  []0     []1-2    [x]3+  Date of Patient follow up with Physician:  18    G-Code (if applicable):      Date G-Code Applied:   9/10/18  PT G-Codes  Functional Assessment Tool Used: LEFS  Score: 81  Functional Limitation: Mobility: Walking and moving around  Mobility: Walking and Moving Around Current Status (): At least 80 percent but less than 100 percent impaired, limited or restricted  Mobility: Walking and Moving Around Goal Status (): At least 20 percent but less than 40 percent impaired, limited or restricted    Progress Note: [x]  Yes  []  No  Next due by: Visit #10        Latex Allergy:  [x]NO      []YES  Preferred Language for Healthcare:   [x]English       []other:    Visit # Insurance Allowable Reporting Period   3   ( 5 used)  Begin Date: 2018               End Date:      RECERT DUE BY:     SUBJECTIVE: Pt arrived 20 min late. Pt says she is in a lot of pain today. Pt says her exercises are painful. Did not take meds today.       OBJECTIVE: See eval  Observation:  Palpation:     Test used Initial score Current Score   Pain Summary VAS 8 9   Functional questionnaire LEFS 81    ROM flexion 100 110    extension Lacking 8  0 with OP. Strength Quad fair     slr  indep with lag               RESTRICTIONS/PRECAUTIONS: HBP, OA, RA, anxiety/ depression  No Pacemaker/   No history of CA. Exercises/Interventions:     Therapeutic Ex Sets/reps Notes   Bike 8 min ROM. Unable to go around. Heel prop 2 x 1 min hep   QS X 10  :10 hep   SAQ 2 x 10     Supine heel slides on SB with rope X 15     SLR 3 x 10 hep   SL hip abd 2 x 10     Seated HS s 5x  :30 hep   Seated towel s 5x  :30 hep   Seated towel knee flex X 15 hep   HR/ TL X 20   hep   hep   LAQ 3  x 10     Incline s 4x  :20                    Manual Intervention     PROM /  Pat mobs. 8 min    Man gastroc/  Man HS s 3 min. NMR re-education                        Therapeutic Exercise and NMR EXR  [x] (91340) Provided verbal/tactile cueing for activities related to strengthening, flexibility, endurance, ROM for improvements in LE, proximal hip, and core control with self care, mobility, lifting, ambulation.  [] (72873) Provided verbal/tactile cueing for activities related to improving balance, coordination, kinesthetic sense, posture, motor skill, proprioception  to assist with LE, proximal hip, and core control in self care, mobility, lifting, ambulation and eccentric single leg control.      NMR and Therapeutic Activities:    [x] (06299 or 44622) Provided verbal/tactile cueing for activities related to improving balance, coordination, kinesthetic sense, posture, motor skill, proprioception and motor activation to allow for proper function of core, proximal hip and LE with self care and ADLs  [] (11716) Gait Re-education- Provided training and instruction to the patient for proper LE, core and proximal hip recruitment and positioning and eccentric body weight control with ambulation re-education including up and down stairs     Home Exercise Program:    [x] (89331) Reviewed/Progressed HEP activities related to strengthening, flexibility, endurance, ROM of core, proximal hip and LE for functional self-care, mobility, lifting and ambulation/stair navigation   [] (51813)Reviewed/Progressed HEP activities related to improving balance, coordination, kinesthetic sense, posture, motor skill, proprioception of core, proximal hip and LE for self care, mobility, lifting, and ambulation/stair navigation      Manual Treatments:  PROM / STM / Oscillations-Mobs:  G-I, II, III, IV (PA's, Inf., Post.)  [x] (96203) Provided manual therapy to mobilize LE, proximal hip and/or LS spine soft tissue/joints for the purpose of modulating pain, promoting relaxation,  increasing ROM, reducing/eliminating soft tissue swelling/inflammation/restriction, improving soft tissue extensibility and allowing for proper ROM for normal function with self care, mobility, lifting and ambulation. Modalities: x 15 min with CP     Charges:  Timed Code Treatment Minutes: 40   Total Treatment Minutes: 55     [] EVAL (LOW) 95770 (typically 20 minutes face-to-face)  [] EVAL (MOD) 59925 (typically 30 minutes face-to-face)  [] EVAL (HIGH) 61111 (typically 45 minutes face-to-face)  [] RE-EVAL     [x] QB(20645) x  2   [] IONTO  [] NMR (15457) x      [] VASO  [x] Manual (80305) x  1    [x] Other:ice  [] TA x       [] Mech Traction (16641)  [] ES(attended) (29475)      [] ES (un) (92429):     GOALS:   Patient stated goal: return to normal ADLs     Therapist goals for Patient:   Short Term Goals: To be achieved in: 2 weeks  1. Independent in HEP and progression per patient tolerance, in order to prevent re-injury. 2. Patient will have a decrease in pain to facilitate improvement in movement, function, and ADLs as indicated by Functional Deficits.     Long Term Goals: To be achieved in: 8 weeks  1. Disability index score of 30% or less for the LEFS to assist with reaching prior level of function.    2. Patient will demonstrate increased AROM of left knee from 0 - 125 to allow for proper joint

## 2018-09-19 ENCOUNTER — HOSPITAL ENCOUNTER (OUTPATIENT)
Dept: PHYSICAL THERAPY | Age: 48
Setting detail: THERAPIES SERIES
Discharge: HOME | End: 2018-09-19
Payer: COMMERCIAL

## 2018-09-19 DIAGNOSIS — Z96.659 HISTORY OF TOTAL KNEE REPLACEMENT, UNSPECIFIED LATERALITY: Primary | ICD-10-CM

## 2018-09-19 PROCEDURE — 97110 THERAPEUTIC EXERCISES: CPT | Performed by: PHYSICAL THERAPIST

## 2018-09-19 PROCEDURE — 97140 MANUAL THERAPY 1/> REGIONS: CPT | Performed by: PHYSICAL THERAPIST

## 2018-09-19 RX ORDER — OXYCODONE HYDROCHLORIDE 5 MG/1
5 TABLET ORAL EVERY 6 HOURS PRN
Qty: 28 TABLET | Refills: 0 | Status: SHIPPED | OUTPATIENT
Start: 2018-09-19 | End: 2018-09-26

## 2018-09-19 NOTE — FLOWSHEET NOTE
[facility], [address] [place] [address]  Phone: [phone]  Fax [phone]    Physical Therapy Daily Treatment Note  Date:  2018    Patient Name:  Dilma Jennings    :  1970  MRN: [id number]  Restrictions/Precautions:    Physician Information:  Referring Practitioner: Dr. Monse Starr  Medical/Treatment Diagnosis Information:  · Diagnosis: Left knee pain  M25.562   s/p Left TKR   DOS:  18  · Treatment Diagnosis: Left knee pain  M25.562  [] Conservative / [x] Surgical - DOS: 18  Therapy Diagnosis/Practice Pattern:  Practice Pattern H: Joint Arthroplasty  Insurance/Certification information:  PT Insurance Information: [id number] of care signed: [] YES  [] NO  Number of Comorbidities:  []0     []1-2    [x]3+  Date of Patient follow up with Physician:  18    G-Code (if applicable):      Date G-Code Applied:   9/10/18  PT G-Codes  Functional Assessment Tool Used: LEFS  Score: 81  Functional Limitation: Mobility: Walking and moving around  Mobility: Walking and Moving Around Current Status (): At least 80 percent but less than 100 percent impaired, limited or restricted  Mobility: Walking and Moving Around Goal Status (): At least 20 percent but less than 40 percent impaired, limited or restricted    Progress Note: [x]  Yes  []  No  Next due by: Visit #10        Latex Allergy:  [x]NO      []YES  Preferred Language for Healthcare:   [x]English       []other:    Visit # Insurance Allowable Reporting Period     ( 5 used)  Begin Date: 2018               End Date:      RECERT DUE BY:     SUBJECTIVE:  Pt takes pain meds to sleep. Amb without assistive device. Pt is finds it easy to dress. Pt has no restrictions driving. Pt is still using step to gait on stairs.      OBJECTIVE: See eval  Observation:  Palpation:     Test used Initial score Current Score   Pain Summary VAS 8 8   Functional flexibility, endurance, ROM of core, proximal hip and LE for functional self-care, mobility, lifting and ambulation/stair navigation   [] (25715)Reviewed/Progressed HEP activities related to improving balance, coordination, kinesthetic sense, posture, motor skill, proprioception of core, proximal hip and LE for self care, mobility, lifting, and ambulation/stair navigation      Manual Treatments:  PROM / STM / Oscillations-Mobs:  G-I, II, III, IV (PA's, Inf., Post.)  [x] (53498) Provided manual therapy to mobilize LE, proximal hip and/or LS spine soft tissue/joints for the purpose of modulating pain, promoting relaxation,  increasing ROM, reducing/eliminating soft tissue swelling/inflammation/restriction, improving soft tissue extensibility and allowing for proper ROM for normal function with self care, mobility, lifting and ambulation. Modalities: x 15 min with CP     Charges:  Timed Code Treatment Minutes: 40   Total Treatment Minutes: 55     [] EVAL (LOW) 41189 (typically 20 minutes face-to-face)  [] EVAL (MOD) 15286 (typically 30 minutes face-to-face)  [] EVAL (HIGH) 65459 (typically 45 minutes face-to-face)  [] RE-EVAL     [x] UM(15758) x  2   [] IONTO  [] NMR (81536) x      [] VASO  [x] Manual (10442) x  1    [x] Other:ice  [] TA x       [] Mech Traction (72230)  [] ES(attended) (12303)      [] ES (un) (92766):     GOALS:   Patient stated goal: return to normal ADLs     Therapist goals for Patient:   Short Term Goals: To be achieved in: 2 weeks  1. Independent in HEP and progression per patient tolerance, in order to prevent re-injury. 2. Patient will have a decrease in pain to facilitate improvement in movement, function, and ADLs as indicated by Functional Deficits.     Long Term Goals: To be achieved in: 8 weeks  1. Disability index score of 30% or less for the LEFS to assist with reaching prior level of function.    2. Patient will demonstrate increased AROM of left knee from 0 - 125 to allow for proper joint functioning as indicated by patients Functional Deficits. 3. Patient will demonstrate an increase in Strength to good proximal hip strength and control, within 5lb HHD in LE to allow for proper functional mobility as indicated by patients Functional Deficits. 4. Patient will return to ascending and descending stairs with symmetrical, reciprocal gait without increased symptoms or restriction. 5. Able to sleep 7 -9 hours    New or Updated Goals (if applicable):  [x] No change to goals established upon initial eval/last progress note:  New Goals:    Progression Towards Functional goals:   [] Patient is progressing as expected towards functional goals listed. [] Progression is slowed due to complexities listed. [] Progression has been slowed due to co-morbidities.   [x] Plan just implemented, too soon to assess goals progression  [] Other:     ASSESSMENT:    [] Improvement noted relative to goals:  [] No Improvement noted related to goals:  Summary/Patient's response to treatment: See Eval    Treatment/Activity Tolerance:  [x] Patient tolerated treatment well [] Patient limited by fatique  [] Patient limited by pain  [] Patient limited by other medical complications  [] Other:     Prognosis: [x] Good [] Fair  [] Poor    Patient Requires Follow-up: [x] Yes  [] No    PLAN: See eval  [x] Continue per plan of care [] Alter current plan (see comments)  [] Plan of care initiated [] Hold pending MD visit [] Discharge    Electronically signed by: Shad Salamanca PT

## 2018-09-19 NOTE — TELEPHONE ENCOUNTER
I called and Left  a message with the patient. I sent a refill of pain medication but we are going to be weaning her down over the next couple of weeks.

## 2018-09-24 ENCOUNTER — HOSPITAL ENCOUNTER (OUTPATIENT)
Dept: PHYSICAL THERAPY | Age: 48
Setting detail: THERAPIES SERIES
Discharge: HOME | End: 2018-09-24
Payer: COMMERCIAL

## 2018-09-24 PROCEDURE — 97110 THERAPEUTIC EXERCISES: CPT | Performed by: PHYSICAL THERAPIST

## 2018-09-24 PROCEDURE — 97140 MANUAL THERAPY 1/> REGIONS: CPT | Performed by: PHYSICAL THERAPIST

## 2018-09-24 PROCEDURE — 97016 VASOPNEUMATIC DEVICE THERAPY: CPT | Performed by: PHYSICAL THERAPIST

## 2018-09-24 NOTE — FLOWSHEET NOTE
[facility],  [address] [place]  Phone: [phone]  Fax [phone]      ATHLETIC TRAINING [address]  Date:  2018    Patient Name:  Renard Rock    :  1970  MRN: [id number]  Restrictions/Precautions:    Medical/Treatment Diagnosis Information:  ·  L knee pain s/p L TKR   DOS 18  ·  L knee pain   Physician Information:   Dr. Mike Casillas Post-op  8 wks  12 wks 16 wks 20 wks   24 wks                            Activity Log                                                  DOS/DOI:                                                    Date: 18    ATC communication: Hx of lymphedema, LBP May need to try jump board for incline NV on Reformer d/t LBP   Bike    Elliptical    Treadmill    Airdyne        Gastroc stretch    Soleus stretch    Hamstring stretch    ITB stretch    Hip Flexor stretch    Quad stretch    Adductor stretch        Weight Shifting sp                              fp                              tp    Lateral walking (with/w/o TB)        Balance: PEP/Christine board                   SLS          Star excursion load/explode          Extremity reach UE/LE        Leg Press Gordon. Ecc.                      Inv. Calf Press Gordon. Ecc.                        Inv.        CAROLA   Flex               ABd               ADd              TKE               Ext        Steps Up               Up and Over               Down               Lateral               Rotation        Squats  mini                  wall                 BOSU         Lunges:  Lunge to Balance                   Balance to Lunge                   Walking        Knee Extension Bilat. Ecc.                               Inv. Hamstring Curls Bilat.                                Ecc.                               Inv.        Soleus Press Bilat.                           Ecc.                           Inv.                             Ladders                Square               Jump/Hop  Low                      Med.                      High                              Reformer FW Parallel Heels 1R1B 10x   Reformer FW Parallel Toes 1R1B 10x   Reformer FW Walking 1R1B 15x                                     Modality HV/GR 15' (high pr)   Initials                             JLW   Time spent with PT assistant

## 2018-09-24 NOTE — FLOWSHEET NOTE
[facility], [address]  Phone: [phone]  Fax [phone]    Physical Therapy Daily Treatment Note  Date:  2018    Patient Name:  Peggy Parks    :  1970  MRN: [id number]  Restrictions/Precautions:    Physician Information:  Referring Practitioner: Dr. Jes Contreras  Medical/Treatment Diagnosis Information:  · Diagnosis: Left knee pain  M25.562   s/p Left TKR   DOS:  18  · Treatment Diagnosis: Left knee pain  M25.562  [] Conservative / [x] Surgical - DOS: 18  Therapy Diagnosis/Practice Pattern:  Practice Pattern H: Joint Arthroplasty  Insurance/Certification information:  PT Insurance Information: [id number] of care signed: [] YES  [] NO  Number of Comorbidities:  []0     []1-2    [x]3+  Date of Patient follow up with Physician:  18    G-Code (if applicable):      Date G-Code Applied:   9/10/18  PT G-Codes  Functional Assessment Tool Used: LEFS  Score: 81  Functional Limitation: Mobility: Walking and moving around  Mobility: Walking and Moving Around Current Status (): At least 80 percent but less than 100 percent impaired, limited or restricted  Mobility: Walking and Moving Around Goal Status (): At least 20 percent but less than 40 percent impaired, limited or restricted    Progress Note: [x]  Yes  []  No  Next due by: Visit #10        Latex Allergy:  [x]NO      []YES  Preferred Language for Healthcare:   [x]English       []other:    Visit # Insurance Allowable Reporting Period    30  ( 5 used)  Begin Date: 2018               End Date:      RECERT DUE BY:     SUBJECTIVE:  Pt amb without cane. Pt sleeps well with medicine. Pt is still using step to gait with stairs.        OBJECTIVE: See eval  Observation:  Palpation:     Test used Initial score Current Score   Pain Summary VAS 8 7   Functional questionnaire LEFS 81    ROM flexion 100 118    extension Lacking 8 functional self-care, mobility, lifting and ambulation/stair navigation   [] (24775)Reviewed/Progressed HEP activities related to improving balance, coordination, kinesthetic sense, posture, motor skill, proprioception of core, proximal hip and LE for self care, mobility, lifting, and ambulation/stair navigation      Manual Treatments:  PROM / STM / Oscillations-Mobs:  G-I, II, III, IV (PA's, Inf., Post.)  [x] (58871) Provided manual therapy to mobilize LE, proximal hip and/or LS spine soft tissue/joints for the purpose of modulating pain, promoting relaxation,  increasing ROM, reducing/eliminating soft tissue swelling/inflammation/restriction, improving soft tissue extensibility and allowing for proper ROM for normal function with self care, mobility, lifting and ambulation. Modalities:Vaso x 15 min. Charges:  Timed Code Treatment Minutes: 40   Total Treatment Minutes: 55     [] EVAL (LOW) 68884 (typically 20 minutes face-to-face)  [] EVAL (MOD) 76687 (typically 30 minutes face-to-face)  [] EVAL (HIGH) 36936 (typically 45 minutes face-to-face)  [] RE-EVAL     [x] FH(09864) x  2   [] IONTO  [] NMR (39279) x      [x] VASO  [x] Manual (07887) x  1    [] Other:ice  [] TA x       [] Mech Traction (71470)  [] ES(attended) (69167)      [] ES (un) (63364):     GOALS:   Patient stated goal: return to normal ADLs     Therapist goals for Patient:   Short Term Goals: To be achieved in: 2 weeks  1. Independent in HEP and progression per patient tolerance, in order to prevent re-injury. 2. Patient will have a decrease in pain to facilitate improvement in movement, function, and ADLs as indicated by Functional Deficits.     Long Term Goals: To be achieved in: 8 weeks  1. Disability index score of 30% or less for the LEFS to assist with reaching prior level of function.    2. Patient will demonstrate increased AROM of left knee from 0 - 125 to allow for proper joint functioning as indicated by patients Functional Deficits. 3. Patient will demonstrate an increase in Strength to good proximal hip strength and control, within 5lb HHD in LE to allow for proper functional mobility as indicated by patients Functional Deficits. 4. Patient will return to ascending and descending stairs with symmetrical, reciprocal gait without increased symptoms or restriction. 5. Able to sleep 7 -9 hours    New or Updated Goals (if applicable):  [x] No change to goals established upon initial eval/last progress note:  New Goals:    Progression Towards Functional goals:   [] Patient is progressing as expected towards functional goals listed. [] Progression is slowed due to complexities listed. [] Progression has been slowed due to co-morbidities.   [x] Plan just implemented, too soon to assess goals progression  [] Other:     ASSESSMENT:    [] Improvement noted relative to goals:  [] No Improvement noted related to goals:  Summary/Patient's response to treatment: See Eval    Treatment/Activity Tolerance:  [x] Patient tolerated treatment well [] Patient limited by fatique  [] Patient limited by pain  [] Patient limited by other medical complications  [] Other:     Prognosis: [x] Good [] Fair  [] Poor    Patient Requires Follow-up: [x] Yes  [] No    PLAN: See eval  [x] Continue per plan of care [] Alter current plan (see comments)  [] Plan of care initiated [] Hold pending MD visit [] Discharge    Electronically signed by: Margoth Webb PT

## 2018-09-26 ENCOUNTER — OFFICE VISIT (OUTPATIENT)
Dept: ORTHOPEDIC SURGERY | Age: 48
End: 2018-09-26

## 2018-09-26 ENCOUNTER — HOSPITAL ENCOUNTER (OUTPATIENT)
Dept: PHYSICAL THERAPY | Age: 48
Setting detail: THERAPIES SERIES
Discharge: HOME | End: 2018-09-26
Payer: COMMERCIAL

## 2018-09-26 VITALS — BODY MASS INDEX: 44.42 KG/M2 | WEIGHT: 283 LBS | HEIGHT: 67 IN

## 2018-09-26 DIAGNOSIS — Z96.659 STATUS POST TOTAL KNEE REPLACEMENT, UNSPECIFIED LATERALITY: ICD-10-CM

## 2018-09-26 PROBLEM — Z01.810 PREOPERATIVE CARDIOVASCULAR EXAMINATION: Status: RESOLVED | Noted: 2017-04-07 | Resolved: 2018-09-26

## 2018-09-26 PROCEDURE — 97110 THERAPEUTIC EXERCISES: CPT | Performed by: PHYSICAL THERAPIST

## 2018-09-26 PROCEDURE — 99024 POSTOP FOLLOW-UP VISIT: CPT | Performed by: PHYSICIAN ASSISTANT

## 2018-09-26 PROCEDURE — 97016 VASOPNEUMATIC DEVICE THERAPY: CPT | Performed by: PHYSICAL THERAPIST

## 2018-09-26 PROCEDURE — 97140 MANUAL THERAPY 1/> REGIONS: CPT | Performed by: PHYSICAL THERAPIST

## 2018-09-26 RX ORDER — SULFAMETHOXAZOLE AND TRIMETHOPRIM 800; 160 MG/1; MG/1
1 TABLET ORAL 2 TIMES DAILY
Qty: 20 TABLET | Refills: 0 | Status: SHIPPED | OUTPATIENT
Start: 2018-09-26 | End: 2018-10-06

## 2018-09-26 RX ORDER — OXYCODONE HYDROCHLORIDE 5 MG/1
5 TABLET ORAL
Qty: 40 TABLET | Refills: 0 | Status: SHIPPED | OUTPATIENT
Start: 2018-09-26 | End: 2018-10-03

## 2018-09-26 NOTE — OP NOTE
[facility], [address] [place] [address] [place]  Phone: [phone]  Fax [phone]    Date: 9/26/2018  Physician: Ellie Dowell  Patient: Clyde Duncan Diagnosis: Left TKR    Patient has received 6 sessions of Physical Therapy over a  week period. Functional Questionnaire Score: Initial 81%, Current 68%  Pain reported has decreased from 8/10 to 8/10     Test used Initial score Current Score   Pain Summary VAS 8 8   Functional questionnaire LEFS 81 68   ROM flexion 100 118     extension Lacking 8  0 with OP. Strength Quad fair  fair+     slr  indep with lag indep                Functional Activity Checklist: The patient continues to have moderate difficulty with the following:   [] Personal care  [] Reaching / overhead  [] Standing    [] Housework chores  [] Climbing  [] Driving / riding in a vehicle    [] Work  [] Squatting  [] Bed / vehicle mobility    [] Lifting  [] Walking  [] Sleeping    [] Pushing / pulling  [] Sitting  [] Concentrating / reading     Specific Functional Improvement and Impression:  Pt has had a reduction in pain meds. She was given mm relaxers, but she says she has not taken them as they make her too drowsy. Pt amb into clinic without cane. Pt uses step to gait on stairs. Pt has c/o numbness around incision site. .  Pt would like to return to pain management with Dr. Mitch Carl    Summary:   [x] Patient is progressing as expected for this condition   [] Patient is progressing, but slower than expected for this condition   [] Patient is not progressing  Clinician Recommendations:   [x] Continue rehabilitation due to objective improvement and continued functional deficits. [] Follow up periodically to advance home exercise program to match level of function. [] Continue rehabitation due to objective improvement and continued functional deficits with progression to work conditioning.    [] Discharge to post-rehab program secondary to maximizing \"medical necessity\" of physical / occupational therapy. [] Discharge independent in a home program as:  [] All goals achieved  [] Maximized \"medical necessity\" of physical / occupational therapy  [] No subjective or objective improvements    Plan: cont.      Electronically signed by: Jessi Aaron PT   License #: [id number]  Physician Recommendations:  [] Follow treatment plan as above [] Discontinue physical therapy  [] Change plan to: _______________________________________________________________

## 2018-09-26 NOTE — PROGRESS NOTES
dose.   Medication Contracts -     Patient being given increased dosage/quantity of opoid pain medication in excess of OSMB guidelines which noted a 30 MED daily of opioids due to the fact that he/she has undergone major orthopaedic surgery as outlined in rule 4731-11-13. Dosages and further duration of the pain medication will be re-evaluated at her post op visit in 2 weeks. Patient was educated on dosing expectations and limits of prescribing as a result of the new [facility] Board rules enacted August 31, 2017. Please also note that this is not the initial opoid prescription issued to this patient but a continuation of medication utilized during the hospital admission as noted in the medical record. OARRS report has also been utilized to screen for any abuse history or suspicious activity as outlined in [place]. All efforts have been taken to prevent abuse potential and misuse of opioid medications including education, screening, and close clinical follow up. Donna Galarza, [facility]    This dictation was performed with a verbal recognition program [facility]) and it was checked for errors. It is possible that there are still dictated errors within this office note. If so, please bring any errors to my attention for an addendum. All efforts were made to ensure that this office note is accurate.

## 2018-09-28 RX ORDER — RIVAROXABAN 20 MG/1
TABLET, FILM COATED ORAL
Qty: 30 TABLET | Refills: 10 | Status: SHIPPED | OUTPATIENT
Start: 2018-09-28

## 2018-10-24 NOTE — TELEPHONE ENCOUNTER
.  Last office visit 9-5-18    Last written 10-26-17 #30 with 11 refills      Next office visit scheduled no future ov     Requested Prescriptions     Pending Prescriptions Disp Refills    buPROPion (WELLBUTRIN XL) 300 MG extended release tablet 30 tablet 11

## 2018-10-25 ENCOUNTER — CARE COORDINATION (OUTPATIENT)
Dept: CARE COORDINATION | Age: 48
End: 2018-10-25

## 2018-10-25 RX ORDER — BUPROPION HYDROCHLORIDE 300 MG/1
TABLET ORAL
Qty: 30 TABLET | Refills: 11 | Status: SHIPPED | OUTPATIENT
Start: 2018-10-25

## 2018-10-29 ENCOUNTER — OFFICE VISIT (OUTPATIENT)
Dept: ORTHOPEDIC SURGERY | Age: 48
End: 2018-10-29

## 2018-10-29 VITALS — WEIGHT: 283.07 LBS | HEIGHT: 67 IN | BODY MASS INDEX: 44.43 KG/M2

## 2018-10-29 DIAGNOSIS — Z96.652 STATUS POST TOTAL LEFT KNEE REPLACEMENT: Primary | ICD-10-CM

## 2018-10-29 PROCEDURE — 99024 POSTOP FOLLOW-UP VISIT: CPT | Performed by: PHYSICIAN ASSISTANT

## 2018-10-29 NOTE — PATIENT INSTRUCTIONS
comfort. This will help keep your arm at your side. 3. Hold one end of the elastic band with the hand of the painful arm. 4. Start with your forearm across your belly. Slowly rotate the forearm out away from your body. Keep your elbow and upper arm tucked against the towel roll or the side of your body until you begin to feel tightness in your shoulder. Slowly move your arm back to where you started. 5. Repeat 8 to 12 times. Follow-up care is a key part of your treatment and safety. Be sure to make and go to all appointments, and call your doctor if you are having problems. It's also a good idea to know your test results and keep a list of the medicines you take. Where can you learn more? Go to [email or web address] org and sign in to your [employer] account. Enter Cleveland Salvador in the Digicompanion box to learn more about \"Rotator Cuff: Exercises. \"     If you do not have an account, please click on the \"Sign Up Now\" link. Current as of: November 29, 2017  Content Version: 11.7  © 7195-6513 [employer]. Care instructions adapted under license by [facility] ([facility]). If you have questions about a medical condition or this instruction, always ask your healthcare professional. [address] any warranty or liability for your use of this information.

## 2018-11-20 ENCOUNTER — OFFICE VISIT (OUTPATIENT)
Dept: ENT CLINIC | Age: 48
End: 2018-11-20
Payer: COMMERCIAL

## 2018-11-20 VITALS
DIASTOLIC BLOOD PRESSURE: 78 MMHG | TEMPERATURE: 98.1 F | SYSTOLIC BLOOD PRESSURE: 135 MMHG | WEIGHT: 249 LBS | HEIGHT: 67 IN | HEART RATE: 84 BPM | BODY MASS INDEX: 39.08 KG/M2

## 2018-11-20 DIAGNOSIS — H90.3 SENSORINEURAL HEARING LOSS (SNHL) OF BOTH EARS: Primary | ICD-10-CM

## 2018-11-20 DIAGNOSIS — J34.89 NASAL VESTIBULITIS: ICD-10-CM

## 2018-11-20 PROCEDURE — G8484 FLU IMMUNIZE NO ADMIN: HCPCS | Performed by: OTOLARYNGOLOGY

## 2018-11-20 PROCEDURE — 99203 OFFICE O/P NEW LOW 30 MIN: CPT | Performed by: OTOLARYNGOLOGY

## 2018-11-20 PROCEDURE — G8417 CALC BMI ABV UP PARAM F/U: HCPCS | Performed by: OTOLARYNGOLOGY

## 2018-11-20 PROCEDURE — G8427 DOCREV CUR MEDS BY ELIG CLIN: HCPCS | Performed by: OTOLARYNGOLOGY

## 2018-11-20 PROCEDURE — 1036F TOBACCO NON-USER: CPT | Performed by: OTOLARYNGOLOGY

## 2018-11-20 RX ORDER — ARIPIPRAZOLE 10 MG/1
5 TABLET ORAL DAILY
COMMUNITY

## 2018-11-20 ASSESSMENT — ENCOUNTER SYMPTOMS
VOMITING: 0
FACIAL SWELLING: 0
EYE ITCHING: 0
COLOR CHANGE: 0
CONSTIPATION: 0
BLOOD IN STOOL: 0
RHINORRHEA: 0
COUGH: 0
SINUS PAIN: 0
SINUS PRESSURE: 0
PHOTOPHOBIA: 0
EYE DISCHARGE: 0
SHORTNESS OF BREATH: 0
CHOKING: 0
VOICE CHANGE: 0
DIARRHEA: 0
NAUSEA: 0
STRIDOR: 0
TROUBLE SWALLOWING: 0
SORE THROAT: 0
WHEEZING: 0
BACK PAIN: 0

## 2018-12-10 ENCOUNTER — HOSPITAL ENCOUNTER (EMERGENCY)
Age: 48
Discharge: HOME | End: 2018-12-10
Payer: COMMERCIAL

## 2018-12-10 ENCOUNTER — APPOINTMENT (OUTPATIENT)
Dept: GENERAL RADIOLOGY | Age: 48
End: 2018-12-10
Payer: COMMERCIAL

## 2018-12-10 VITALS
RESPIRATION RATE: 16 BRPM | HEART RATE: 63 BPM | SYSTOLIC BLOOD PRESSURE: 181 MMHG | DIASTOLIC BLOOD PRESSURE: 93 MMHG | OXYGEN SATURATION: 100 % | TEMPERATURE: 98.2 F | HEIGHT: 68 IN | BODY MASS INDEX: 39.4 KG/M2 | WEIGHT: 260 LBS

## 2018-12-10 DIAGNOSIS — S51.831A PUNCTURE WOUND OF RIGHT FOREARM, INITIAL ENCOUNTER: ICD-10-CM

## 2018-12-10 DIAGNOSIS — Z79.01 CHRONIC ANTICOAGULATION: ICD-10-CM

## 2018-12-10 DIAGNOSIS — S50.11XA TRAUMATIC HEMATOMA OF RIGHT FOREARM, INITIAL ENCOUNTER: ICD-10-CM
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
## 2018-12-10 DIAGNOSIS — S51.811A LACERATION OF RIGHT FOREARM, INITIAL ENCOUNTER: ICD-10-CM

## 2018-12-10 PROCEDURE — 6370000000 HC RX 637 (ALT 250 FOR IP): Performed by: NURSE PRACTITIONER

## 2018-12-10 PROCEDURE — 4500000023 HC ED LEVEL 3 PROCEDURE

## 2018-12-10 PROCEDURE — 99283 EMERGENCY DEPT VISIT LOW MDM: CPT

## 2018-12-10 PROCEDURE — 73090 X-RAY EXAM OF FOREARM: CPT

## 2018-12-10 RX ORDER — HYDROCODONE BITARTRATE AND ACETAMINOPHEN 5; 325 MG/1; MG/1
1 TABLET ORAL ONCE
Status: COMPLETED | OUTPATIENT
Start: 2018-12-10 | End: 2018-12-10

## 2018-12-10 RX ORDER — HYDROCODONE BITARTRATE AND ACETAMINOPHEN 5; 325 MG/1; MG/1
1 TABLET ORAL EVERY 6 HOURS PRN
Qty: 20 TABLET | Refills: 0 | Status: SHIPPED | OUTPATIENT
Start: 2018-12-10 | End: 2018-12-13

## 2018-12-10 RX ORDER — DOXYCYCLINE HYCLATE 100 MG
100 TABLET ORAL ONCE
Status: COMPLETED | OUTPATIENT
Start: 2018-12-10 | End: 2018-12-10

## 2018-12-10 RX ORDER — DOXYCYCLINE 100 MG/1
100 TABLET ORAL 2 TIMES DAILY
Qty: 20 TABLET | Refills: 0 | Status: SHIPPED | OUTPATIENT
Start: 2018-12-10 | End: 2018-12-20

## 2018-12-10 RX ADMIN — DOXYCYCLINE HYCLATE 100 MG: 100 TABLET, COATED ORAL at 23:22

## 2018-12-10 RX ADMIN — HYDROCODONE BITARTRATE AND ACETAMINOPHEN 1 TABLET: 5; 325 TABLET ORAL at 22:06

## 2018-12-10 ASSESSMENT — PAIN SCALES - GENERAL
PAINLEVEL_OUTOF10: 10
PAINLEVEL_OUTOF10: 10
PAINLEVEL_OUTOF10: 8

## 2018-12-11 ENCOUNTER — CARE COORDINATION (OUTPATIENT)
Dept: CARE COORDINATION | Age: 48
End: 2018-12-11

## 2018-12-11 NOTE — ED PROVIDER NOTES
compression to help with hematoma. Patient will be started on doxycycline for prophylactic antibiotic coverage due to the dog bite, she is allergic to Rocephin and I do not feel starting her on Augmentin would be the best option. At this point I do not feel the patient requires further work up and it is reasonable to discharge the patient. The patient was instructed on wound care, signs and symptoms of infection, when to return to a health care provider and follow up for suture removal. The patient verbalized understanding and agreement with the plan. While in ED patient received   Medications   HYDROcodone-acetaminophen (NORCO) 5-325 MG per tablet 1 tablet (1 tablet Oral Given 12/10/18 2206)   doxycycline hyclate (VIBRA-TABS) tablet 100 mg (100 mg Oral Given 12/10/18 2322)       A discussion was had with the patient regarding diagnosis, diagnostic testing results, treatment/ plan of care, and follow up. All questions were answered. Patient will follow up as directed for further evaluation/treatment. The patient was given strict return precautions as we discussed symptoms that would necessitate return to the ED. Patient will return to ED for new/worsening symptoms. The patient verbalized their understanding and agreement with the above plan. I estimate there is LOW risk for CELLULITIS, COMPARTMENT SYNDROME, NECROTIZING FASCIITIS, TENDON OR NEUROVASCULAR INJURY, or FOREIGN BODY, thus I consider the discharge disposition reasonable. Also, there is no evidence or peritonitis, sepsis, or toxicity. Cherl Friday and I have discussed the diagnosis and risks, and we agree with discharging home to follow-up with their primary doctor. We also discussed returning to the Emergency Department immediately if new or worsening symptoms occur.  We have discussed the symptoms which are most concerning (e.g., changing or worsening pain, fever, numbness, weakness, cool or painful digits) that necessitate immediate

## 2018-12-12 ENCOUNTER — OFFICE VISIT (OUTPATIENT)
Dept: FAMILY MEDICINE CLINIC | Age: 48
End: 2018-12-12
Payer: COMMERCIAL

## 2018-12-12 VITALS
BODY MASS INDEX: 39.53 KG/M2 | SYSTOLIC BLOOD PRESSURE: 132 MMHG | WEIGHT: 260 LBS | HEART RATE: 70 BPM | OXYGEN SATURATION: 98 % | DIASTOLIC BLOOD PRESSURE: 64 MMHG
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
## 2018-12-12 PROCEDURE — 99213 OFFICE O/P EST LOW 20 MIN: CPT | Performed by: PHYSICIAN ASSISTANT

## 2018-12-12 PROCEDURE — G8427 DOCREV CUR MEDS BY ELIG CLIN: HCPCS | Performed by: PHYSICIAN ASSISTANT

## 2018-12-12 PROCEDURE — G8484 FLU IMMUNIZE NO ADMIN: HCPCS | Performed by: PHYSICIAN ASSISTANT

## 2018-12-12 PROCEDURE — 1036F TOBACCO NON-USER: CPT | Performed by: PHYSICIAN ASSISTANT

## 2018-12-12 PROCEDURE — G8417 CALC BMI ABV UP PARAM F/U: HCPCS | Performed by: PHYSICIAN ASSISTANT

## 2018-12-12 ASSESSMENT — ENCOUNTER SYMPTOMS
COUGH: 0
SHORTNESS OF BREATH: 0

## 2018-12-12 NOTE — PROGRESS NOTES
mg by mouth as needed ) 60 tablet 1    HYDROcodone-acetaminophen (NORCO) 5-325 MG per tablet Take 1 tablet by mouth every 6 hours as needed for Pain for up to 3 days. . 20 tablet 0    losartan-hydrochlorothiazide (HYZAAR) 50-12.5 MG per tablet TAKE 1 TABLET BY MOUTH DAILY 30 tablet 0     No current facility-administered medications for this visit. Vitals:    12/12/18 1842   BP: 132/64   Pulse: 70   SpO2: 98%   Weight: 260 lb (117.9 kg)     Estimated body mass index is 39.53 kg/m² as calculated from the following:    Height as of 12/10/18: 5' 8\" (1.727 m). Weight as of this encounter: 260 lb (117.9 kg). Physical Exam   Constitutional: She is oriented to person, place, and time. She appears well-developed and well-nourished. No distress. HENT:   Head: Normocephalic and atraumatic. Eyes: Pupils are equal, round, and reactive to light. Conjunctivae and EOM are normal.   Neck: Neck supple. Cardiovascular: Normal rate, regular rhythm and normal heart sounds. No murmur heard. Pulmonary/Chest: Effort normal and breath sounds normal. She has no wheezes. Abdominal: Soft. Bowel sounds are normal. There is no tenderness. Musculoskeletal: She exhibits no edema. Normal ROM of Bilateral upper and lower extremities and spine    Lymphadenopathy:     She has no cervical adenopathy. Neurological: She is alert and oriented to person, place, and time. She has normal reflexes. Skin: Skin is warm and dry. No rash noted. 2 inch wound to right forearm with 2 visible sutures . Edges are well approximated, serosanguinous drainage. No warmth or surrounding erythema. Moderately sized surrounding hematoma/    Psychiatric: She has a normal mood and affect. ASSESSMENT and PLAN:  Sarah Mueller was seen today for follow-up from hospital.    Diagnoses and all orders for this visit:    Dog bite, subsequent encounter  - Dressing removed and wound cleaned with water and mild soap.  Dressing reapplied  - Instructed patient to remove dressing and wash daily  - Return in 8 days for wound check and suture removal, continue abx     Return in about 8 days (around 12/20/2018) for suture removal .

## 2018-12-13 ENCOUNTER — CARE COORDINATION (OUTPATIENT)
Dept: CARE COORDINATION | Age: 48
End: 2018-12-13

## 2018-12-13 NOTE — CARE COORDINATION
Attempted outreach - message left to return my call with good time to call back.    Follow up re dog bite/wound     Plan - Follow up at PCP PA appt below  Future Appointments  Date Time Provider Michael Yu   12/13/2018 3:30 PM DO ZAIRA Alvarenga   12/20/2018 2:00 PM ROSITA Bergman

## 2018-12-19 ENCOUNTER — OFFICE VISIT (OUTPATIENT)
Dept: ENT CLINIC | Age: 48
End: 2018-12-19
Payer: COMMERCIAL

## 2018-12-19 VITALS
HEIGHT: 68 IN | WEIGHT: 252 LBS | TEMPERATURE: 97.4 F | HEART RATE: 96 BPM | SYSTOLIC BLOOD PRESSURE: 135 MMHG | BODY MASS INDEX: 38.19 KG/M2 | DIASTOLIC BLOOD PRESSURE: 78 MMHG

## 2018-12-19 DIAGNOSIS — J34.89 NASAL VESTIBULITIS: ICD-10-CM

## 2018-12-19 DIAGNOSIS — H83.3X3 NOISE-INDUCED HEARING LOSS OF BOTH EARS: Primary | ICD-10-CM

## 2018-12-19 PROCEDURE — G8427 DOCREV CUR MEDS BY ELIG CLIN: HCPCS | Performed by: OTOLARYNGOLOGY

## 2018-12-19 PROCEDURE — 99213 OFFICE O/P EST LOW 20 MIN: CPT | Performed by: OTOLARYNGOLOGY

## 2018-12-19 PROCEDURE — G8484 FLU IMMUNIZE NO ADMIN: HCPCS | Performed by: OTOLARYNGOLOGY

## 2018-12-19 PROCEDURE — 1036F TOBACCO NON-USER: CPT | Performed by: OTOLARYNGOLOGY

## 2018-12-19 PROCEDURE — G8417 CALC BMI ABV UP PARAM F/U: HCPCS | Performed by: OTOLARYNGOLOGY

## 2018-12-19 ASSESSMENT — ENCOUNTER SYMPTOMS
EYE REDNESS: 0
DIARRHEA: 0
SORE THROAT: 0
VOICE CHANGE: 0
FACIAL SWELLING: 0
RHINORRHEA: 0
PHOTOPHOBIA: 0
STRIDOR: 0
CHOKING: 0
SHORTNESS OF BREATH: 0
SINUS PAIN: 0
SINUS PRESSURE: 0
TROUBLE SWALLOWING: 0
EYE ITCHING: 0
COLOR CHANGE: 0
EYE PAIN: 0
NAUSEA: 0
COUGH: 0

## 2018-12-19 NOTE — PROGRESS NOTES
08/14/2018    KNEE ARTHROSCOPY Left 2006    MENISCUS    KNEE ARTHROSCOPY Left 09/09/2016    MENISCUS    WA TOTAL KNEE ARTHROPLASTY Left 8/14/2018    LEFT TOTAL KNEE REPLACEMENT WITH PLATELET RICH PLASMA REYES & NEPHEW LEGION OX XLPE performed by Bobby Luna MD at [place] [address]  11/29/2017    LAPAROSCOPIC SLEEVE GASTRECTOMY-ETHICON, LIVER BIOPSY    UMBILICAL HERNIA REPAIR  11/05/2012    LAPAROSCOPIC WITH PHYSIO MESH       Family History     Family History   Problem Relation Age of Onset    Adopted: Yes       Social History     Social History     Social History    Marital status:      Spouse name: N/A    Number of children: N/A    Years of education: N/A     Occupational History    Not on file. Social History Main Topics    Smoking status: Never Smoker    Smokeless tobacco: Never Used    Alcohol use Yes      Comment: rarely    Drug use: No      Comment: on chronic percocet for back pain     Sexual activity: Not Currently     Other Topics Concern    Not on file     Social History Narrative    No narrative on file       Allergies     Allergies   Allergen Reactions    Rocephin [Ceftriaxone Sodium In Dextrose] Hives     ITCH    Lisinopril Other (See Comments)     coughing       Medications     Current Outpatient Prescriptions   Medication Sig Dispense Refill    doxycycline monohydrate (ADOXA) 100 MG tablet Take 1 tablet by mouth 2 times daily for 10 days 20 tablet 0    ARIPiprazole (ABILIFY) 10 MG tablet Take 5 mg by mouth daily       buPROPion (WELLBUTRIN XL) 300 MG extended release tablet TAKE (1) TABLET BY MOUTH ONCE DAILY 30 tablet 11    XARELTO 20 MG TABS tablet TAKE (1) TABLET BY MOUTH EVERY 24 HOURS.  -PATIENT NEEDS APPOINTMENT FOR FURTHER REFILLS- 30 tablet 10    valACYclovir (VALTREX) 1 g tablet TAKE 2 TABLETS BY MOUTH EVERY 12 HOURS FOR 2 DAYS 8 tablet 1    losartan-hydrochlorothiazide (HYZAAR) 50-12.5 MG per tablet TAKE 1 TABLET BY MOUTH DAILY 30 tablet 0 drainage. Tympanic membrane is not perforated. No middle ear effusion. Nose: No mucosal edema, rhinorrhea or septal deviation. No epistaxis. Mouth/Throat: Uvula is midline, oropharynx is clear and moist and mucous membranes are normal. No trismus in the jaw. Normal dentition. No oropharyngeal exudate. Eyes: Pupils are equal, round, and reactive to light. EOM are normal. Right eye exhibits no discharge. Left eye exhibits no discharge. No scleral icterus. Neck: Phonation normal. Neck supple. No tracheal deviation present. No thyromegaly present. Pulmonary/Chest: Effort normal. No stridor. No respiratory distress. Lymphadenopathy:     She has no cervical adenopathy. Neurological: She is alert and oriented to person, place, and time. No cranial nerve deficit. Skin: Skin is warm and dry. Psychiatric: She has a normal mood and affect. Her behavior is normal.         Procedure           Assessment and Plan     1. Noise-induced hearing loss of both ears  Audiometry reveals bilateral 4 kHz mild sensorineural hearing loss with 25 dB thresholds. I discussed with the patient that this is likely secondary to noise exposure. She does not meet criteria for hearing application at this time. I recommend a repeat audiogram in 3 years. I instructed the patient to use hearing protection when around loud noises. She may call with any questions or concerns. 2. Nasal vestibulitis  The patient's symptoms have significantly improved with the use of Bactroban ointment. She will continue to use this as needed. Return if symptoms worsen or fail to improve, for 3 years audiometry.

## 2018-12-20 ENCOUNTER — PROCEDURE VISIT (OUTPATIENT)
Dept: FAMILY MEDICINE CLINIC | Age: 48
End: 2018-12-20
Payer: COMMERCIAL

## 2018-12-20 VITALS
SYSTOLIC BLOOD PRESSURE: 120 MMHG | DIASTOLIC BLOOD PRESSURE: 82 MMHG | BODY MASS INDEX: 38.71 KG/M2 | HEART RATE: 72 BPM | HEIGHT: 68 IN | WEIGHT: 255.4 LBS | OXYGEN SATURATION: 100 %
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
## 2018-12-20 DIAGNOSIS — Z23 FLU VACCINE NEED: Primary | ICD-10-CM

## 2018-12-20 DIAGNOSIS — B37.9 YEAST INFECTION: ICD-10-CM

## 2018-12-20 PROCEDURE — 90471 IMMUNIZATION ADMIN: CPT | Performed by: PHYSICIAN ASSISTANT

## 2018-12-20 PROCEDURE — 1036F TOBACCO NON-USER: CPT | Performed by: PHYSICIAN ASSISTANT

## 2018-12-20 PROCEDURE — G8427 DOCREV CUR MEDS BY ELIG CLIN: HCPCS | Performed by: PHYSICIAN ASSISTANT

## 2018-12-20 PROCEDURE — 99213 OFFICE O/P EST LOW 20 MIN: CPT | Performed by: PHYSICIAN ASSISTANT

## 2018-12-20 PROCEDURE — 90686 IIV4 VACC NO PRSV 0.5 ML IM: CPT | Performed by: PHYSICIAN ASSISTANT

## 2018-12-20 PROCEDURE — G8482 FLU IMMUNIZE ORDER/ADMIN: HCPCS | Performed by: PHYSICIAN ASSISTANT

## 2018-12-20 PROCEDURE — G8417 CALC BMI ABV UP PARAM F/U: HCPCS | Performed by: PHYSICIAN ASSISTANT

## 2018-12-20 RX ORDER — FLUCONAZOLE 150 MG/1
150 TABLET ORAL
Qty: 2 TABLET | Refills: 0 | Status: SHIPPED | OUTPATIENT
Start: 2018-12-20 | End: 2018-12-24

## 2018-12-23 ASSESSMENT — ENCOUNTER SYMPTOMS
VOMITING: 0
CHEST TIGHTNESS: 0
SHORTNESS OF BREATH: 0
NAUSEA: 0

## 2019-02-01 ENCOUNTER — CARE COORDINATION (OUTPATIENT)
Dept: CARE COORDINATION | Age: 49
End: 2019-02-01

## 2019-03-01 RX ORDER — CITALOPRAM 40 MG/1
TABLET ORAL
Qty: 30 TABLET | Refills: 2 | Status: SHIPPED | OUTPATIENT
Start: 2019-03-01 | End: 2019-04-12 | Stop reason: SDUPTHER

## 2019-03-13 ENCOUNTER — CARE COORDINATION (OUTPATIENT)
Dept: CARE COORDINATION | Age: 49
End: 2019-03-13

## 2019-03-25 ENCOUNTER — TELEPHONE (OUTPATIENT)
Dept: ORTHOPEDIC SURGERY | Age: 49
End: 2019-03-25

## 2019-04-10 ENCOUNTER — CARE COORDINATION (OUTPATIENT)
Dept: CARE COORDINATION | Age: 49
End: 2019-04-10

## 2019-04-10 NOTE — CARE COORDINATION
Ambulatory Care Coordination Note  4/10/2019  CM Risk Score: 5  Levi Mortality Risk Score:      ACC: Oscar Vance, RN    Summary Note: Called pt for CC outreach. Pt stated she missed her appt yesterday because she fell asleep. Pt state her Lg Flaco is really bothering\" her.  C/o constant nausea and vomiting. Pt stated she has changed her diet and \"eating better\" to see if that is the issue. No improvement. Pt is concerned she is pregnant. Pt is having regular menstruation cycles but is currently 7 days late. Pt stated she took a pregnancy test and it is negative. Concerns for ectopic pregnancy due to previous endometrial ablation. Pt is also concerned \"something is wrong with my stomach from gastric sleeve\". Encouraged pt to call office and make new appt. Pt stated she can't make appt today because it is her daughter's birthday. Pt not able to give current weight. Denies any other symptoms at this time. Plan     -Will f/u with pt in one week        Care Coordination Interventions    Program Enrollment:  Rising Risk  Referral from Primary Care Provider:  No  Suggested Interventions and Community Resources  Registered Dietician:  Declined  Other Therapy Services: In Process (Comment: Has Psychiartrist Dr. Eleanor Chand 3/2019)  Zone Management Tools:  Completed (Comment: CHF 3/2019)         Goals Addressed                 This Visit's Progress     Self Monitoring   No change     Daily Weights - I will weight myself as directed - Daily and write down weights  I will notify my provider of any increase in weight by 3 or more pounds in 2 days OR 5 or more pounds in a week. None Recently Recorded    Barriers: overwhelmed by complexity of regimen and stress  Plan for overcoming my barriers: Work with [facility] for accountability and will obtain a new scale or the one from pt home.   Confidence: 6/10  Anticipated Goal Completion Date: 3/30/2019              Prior to Admission medications    Medication Sig Start Date End Date Taking? Authorizing Provider   citalopram (CELEXA) 40 MG tablet TAKE (1) TABLET BY MOUTH ONCE DAILY 3/1/19   ROSITA Casey   ARIPiprazole (ABILIFY) 10 MG tablet Take 5 mg by mouth daily     Historical Provider, MD   buPROPion (WELLBUTRIN XL) 300 MG extended release tablet TAKE (1) TABLET BY MOUTH ONCE DAILY 10/25/18   ROSITA Casey   XARELTO 20 MG TABS tablet TAKE (1) TABLET BY MOUTH EVERY 24 HOURS. -PATIENT NEEDS APPOINTMENT FOR FURTHER REFILLS- 9/28/18   ROSITA Casey   valACYclovir (VALTREX) 1 g tablet TAKE 2 TABLETS BY MOUTH EVERY 12 HOURS FOR 2 DAYS 6/25/18   Alix Shepard MD   losartan-hydrochlorothiazide [facility]) 50-12.5 MG per tablet TAKE 1 TABLET BY MOUTH DAILY 3/21/18   FEROZ Mckeon - CNP   ondansetron (ZOFRAN ODT) 4 MG disintegrating tablet Take 2 tablets by mouth every 8 hours as needed for Nausea 3/8/18   FEROZ Sykes - CNP   dextroamphetamine (DEXTROSTAT) 10 MG tablet Take 10 mg by mouth 2 times daily . Historical Provider, MD   amphetamine-dextroamphetamine (ADDERALL XR) 30 MG extended release capsule Take 30 mg by mouth 2 times daily . Historical Provider, MD   furosemide (LASIX) 40 MG tablet Take 1 tablet by mouth daily  Patient taking differently: Take 40 mg by mouth as needed  5/9/16   Winnie Shoemaker MD       No future appointments.

## 2019-04-12 RX ORDER — CITALOPRAM 40 MG/1
TABLET ORAL
Qty: 30 TABLET | Refills: 10 | Status: SHIPPED | OUTPATIENT
Start: 2019-04-12

## 2019-04-23 ENCOUNTER — CARE COORDINATION (OUTPATIENT)
Dept: CARE COORDINATION | Age: 49
End: 2019-04-23

## 2019-05-01 ENCOUNTER — CARE COORDINATION (OUTPATIENT)
Dept: CARE COORDINATION | Age: 49
End: 2019-05-01

## 2019-05-14 ENCOUNTER — TELEPHONE (OUTPATIENT)
Dept: BARIATRICS/WEIGHT MGMT | Age: 49
End: 2019-05-14

## 2019-05-14 NOTE — LETTER
Carlos Alberto Valentino MD  [facility] ([facility]) [facility] [employer]  [address] [facility], [address] [place] [address], [address] [facility]  [phone]  Phone  [phone]  Fax    Kalebene Kind,    We noticed that you missed your last appointment. We are interested in your progress and how you have been feeling! As you know, it is important to complete regular follow-up visits to monitor your health after surgery and to insure the best success of your procedure. Please call the office today at [phone] to schedule an appointment. We look forward to seeing you!       Carlos Alberto Valentino MD  [facility] ([facility]) [facility]  [address] [facility], [address] [place] [address], [address] [facility]  [phone]  Phone  [phone]  Fax

## 2019-05-25 ENCOUNTER — HOSPITAL ENCOUNTER (EMERGENCY)
Age: 49
Discharge: HOME | End: 2019-05-25
Attending: EMERGENCY MEDICINE
Payer: COMMERCIAL

## 2019-05-25 VITALS
BODY MASS INDEX: 37.89 KG/M2 | DIASTOLIC BLOOD PRESSURE: 110 MMHG | HEIGHT: 68 IN | TEMPERATURE: 98.6 F | WEIGHT: 250 LBS | SYSTOLIC BLOOD PRESSURE: 144 MMHG | HEART RATE: 78 BPM | RESPIRATION RATE: 20 BRPM | OXYGEN SATURATION: 98 %

## 2019-05-25 DIAGNOSIS — L03.116 CELLULITIS OF LEFT LOWER LEG: ICD-10-CM

## 2019-05-25 DIAGNOSIS — T24.232A PARTIAL THICKNESS BURN OF LEFT LOWER LEG, INITIAL ENCOUNTER: Primary | ICD-10-CM

## 2019-05-25 PROCEDURE — 90471 IMMUNIZATION ADMIN: CPT | Performed by: NURSE PRACTITIONER

## 2019-05-25 PROCEDURE — 99282 EMERGENCY DEPT VISIT SF MDM: CPT

## 2019-05-25 PROCEDURE — 6370000000 HC RX 637 (ALT 250 FOR IP): Performed by: NURSE PRACTITIONER

## 2019-05-25 PROCEDURE — 90715 TDAP VACCINE 7 YRS/> IM: CPT | Performed by: NURSE PRACTITIONER

## 2019-05-25 PROCEDURE — 6360000002 HC RX W HCPCS: Performed by: NURSE PRACTITIONER

## 2019-05-25 RX ORDER — DOXYCYCLINE HYCLATE 100 MG
100 TABLET ORAL ONCE
Status: COMPLETED | OUTPATIENT
Start: 2019-05-25 | End: 2019-05-25

## 2019-05-25 RX ORDER — DOXYCYCLINE 100 MG/1
100 TABLET ORAL 2 TIMES DAILY
Qty: 20 TABLET | Refills: 0 | Status: SHIPPED | OUTPATIENT
Start: 2019-05-25 | End: 2019-06-04

## 2019-05-25 RX ORDER — OXYCODONE HYDROCHLORIDE AND ACETAMINOPHEN 5; 325 MG/1; MG/1
1 TABLET ORAL ONCE
Status: COMPLETED | OUTPATIENT
Start: 2019-05-25 | End: 2019-05-25

## 2019-05-25 RX ADMIN — TETANUS TOXOID, REDUCED DIPHTHERIA TOXOID AND ACELLULAR PERTUSSIS VACCINE, ADSORBED 0.5 ML: 5; 2.5; 8; 8; 2.5 SUSPENSION INTRAMUSCULAR at 22:28

## 2019-05-25 RX ADMIN — OXYCODONE HYDROCHLORIDE AND ACETAMINOPHEN 1 TABLET: 5; 325 TABLET ORAL at 22:27

## 2019-05-25 RX ADMIN — DOXYCYCLINE HYCLATE 100 MG: 100 TABLET, COATED ORAL at 22:27

## 2019-05-25 ASSESSMENT — ENCOUNTER SYMPTOMS
VOMITING: 0
NAUSEA: 0

## 2019-05-25 ASSESSMENT — PAIN DESCRIPTION - LOCATION: LOCATION: LEG

## 2019-05-25 ASSESSMENT — PAIN DESCRIPTION - ORIENTATION: ORIENTATION: LEFT;LOWER

## 2019-05-25 ASSESSMENT — PAIN SCALES - GENERAL
PAINLEVEL_OUTOF10: 8
PAINLEVEL_OUTOF10: 8

## 2019-05-25 ASSESSMENT — PAIN DESCRIPTION - PAIN TYPE: TYPE: ACUTE PAIN
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
## 2019-05-30 ENCOUNTER — CARE COORDINATION (OUTPATIENT)
Dept: CARE COORDINATION | Age: 49
End: 2019-05-30

## 2019-06-14 ENCOUNTER — CARE COORDINATION (OUTPATIENT)
Dept: CARE COORDINATION | Age: 49
End: 2019-06-14

## 2019-06-14 RX ORDER — FUROSEMIDE 40 MG/1
40 TABLET ORAL PRN
Qty: 7 TABLET | Refills: 0 | Status: SHIPPED | OUTPATIENT
Start: 2019-06-14 | End: 2019-06-19 | Stop reason: SDUPTHER

## 2019-06-14 RX ORDER — FUROSEMIDE 40 MG/1
40 TABLET ORAL DAILY
Qty: 60 TABLET | Refills: 1 | Status: CANCELLED | OUTPATIENT
Start: 2019-06-14

## 2019-06-14 RX ORDER — FUROSEMIDE 40 MG/1
40 TABLET ORAL PRN
Qty: 7 TABLET | Refills: 0 | Status: SHIPPED | OUTPATIENT
Start: 2019-06-14 | End: 2019-06-14 | Stop reason: SDUPTHER

## 2019-06-14 NOTE — CARE COORDINATION
Tiarra Odom notified of on week refill on lasix and encouraged to keep appointment on 6/19/19. Understanding voiced.

## 2019-06-14 NOTE — TELEPHONE ENCOUNTER
Francisco Javier Dos Santos,   I will send in one week supply. Needs OV for refills. .. Has history of noncompliance.

## 2019-06-19 ENCOUNTER — CARE COORDINATION (OUTPATIENT)
Dept: CARE COORDINATION | Age: 49
End: 2019-06-19

## 2019-06-19 ENCOUNTER — OFFICE VISIT (OUTPATIENT)
Dept: FAMILY MEDICINE CLINIC | Age: 49
End: 2019-06-19
Payer: COMMERCIAL

## 2019-06-19 VITALS
SYSTOLIC BLOOD PRESSURE: 128 MMHG | WEIGHT: 263.2 LBS | TEMPERATURE: 98.5 F | DIASTOLIC BLOOD PRESSURE: 80 MMHG | RESPIRATION RATE: 16 BRPM | BODY MASS INDEX: 39.89 KG/M2 | HEIGHT: 68 IN | HEART RATE: 64 BPM | OXYGEN SATURATION: 98 %

## 2019-06-19 DIAGNOSIS — I10 HYPERTENSION, ESSENTIAL: ICD-10-CM

## 2019-06-19 DIAGNOSIS — E78.2 MIXED HYPERLIPIDEMIA: ICD-10-CM

## 2019-06-19 DIAGNOSIS — R10.2 SUPRAPUBIC ABDOMINAL PAIN: ICD-10-CM

## 2019-06-19 DIAGNOSIS — I87.2 VENOUS INSUFFICIENCY: Primary | ICD-10-CM

## 2019-06-19 LAB
BILIRUBIN, POC: NORMAL
BLOOD URINE, POC: NORMAL
CLARITY, POC: NORMAL
COLOR, POC: YELLOW
GLUCOSE URINE, POC: NORMAL
HCT VFR BLD CALC: 37.3 % (ref 36–48)
HEMOGLOBIN: 12.1 G/DL (ref 12–16)
KETONES, POC: NORMAL
LEUKOCYTE EST, POC: NORMAL
MCH RBC QN AUTO: 24.9 PG (ref 26–34)
MCHC RBC AUTO-ENTMCNC: 32.4 G/DL (ref 31–36)
MCV RBC AUTO: 76.9 FL (ref 80–100)
NITRITE, POC: NORMAL
PDW BLD-RTO: 14.7 % (ref 12.4–15.4)
PH, POC: 8.5
PLATELET # BLD: 227 K/UL (ref 135–450)
PMV BLD AUTO: 8.1 FL (ref 5–10.5)
PROTEIN, POC: 30
RBC # BLD: 4.85 M/UL (ref 4–5.2)
SPECIFIC GRAVITY, POC: 1.01
UROBILINOGEN, POC: 1
WBC # BLD: 5.7 K/UL (ref 4–11)

## 2019-06-19 PROCEDURE — 3017F COLORECTAL CA SCREEN DOC REV: CPT | Performed by: PHYSICIAN ASSISTANT

## 2019-06-19 PROCEDURE — 1036F TOBACCO NON-USER: CPT | Performed by: PHYSICIAN ASSISTANT

## 2019-06-19 PROCEDURE — 81002 URINALYSIS NONAUTO W/O SCOPE: CPT | Performed by: PHYSICIAN ASSISTANT

## 2019-06-19 PROCEDURE — 99214 OFFICE O/P EST MOD 30 MIN: CPT | Performed by: PHYSICIAN ASSISTANT

## 2019-06-19 PROCEDURE — G8427 DOCREV CUR MEDS BY ELIG CLIN: HCPCS | Performed by: PHYSICIAN ASSISTANT

## 2019-06-19 PROCEDURE — G8417 CALC BMI ABV UP PARAM F/U: HCPCS | Performed by: PHYSICIAN ASSISTANT

## 2019-06-19 RX ORDER — FUROSEMIDE 40 MG/1
40 TABLET ORAL PRN
Qty: 30 TABLET | Refills: 2 | Status: SHIPPED | OUTPATIENT
Start: 2019-06-19

## 2019-06-19 RX ORDER — GINSENG 100 MG
CAPSULE ORAL
COMMUNITY
Start: 2019-05-29

## 2019-06-19 ASSESSMENT — ENCOUNTER SYMPTOMS
NAUSEA: 0
SHORTNESS OF BREATH: 0
COUGH: 0
DIARRHEA: 0
ABDOMINAL PAIN: 1
RHINORRHEA: 0
VOMITING: 0
SORE THROAT: 0
CONSTIPATION: 0

## 2019-06-19 NOTE — PROGRESS NOTES
2019  Marla Hutchison (: 1970)  47 y.o. HPI    Patient presents for follow up of chronic medical conditions. Endorses worsening lower extremity swelling. Has been off of most of her medications in the last 3 months. No longer taking losartan/hctz or prn lasix. States she lost 50 lbs and BP has been normal since that time. Has history of HF and monitors weight almost daily. States she has had almost 10lb weight gain in the last 2 weeks without changing any dietary habits. Requested lasix refill last week, picked up last night has only had 1 dose. Denies cp, soa, orthopnea and joe. Is overdue for follow up with cards. Last echo 2019 LVEF 02%, grade I diastolic dysfunction, moderate LVH. Unchanged from 2016 echo. Patient reports following with pulm for suspected sleep apnea. Per patient, sleep study within the next couple of weeks. Also endorsing some right lower quadrant abdominal pain. Has been there for the last 3 days, acute worsened today. Diarrhea x 2 yesterday. No bowel movement today. Normal appetite. Denies fever, nausea and vomiting. Is mid cycle, still with menses, although not regular. Denies melena/hematochezia. Is up to date on pap, [facility]. Denies urinary symptoms. Continues on xarelto for lupus AC disorder. Review of Systems   Constitutional: Negative for activity change, chills and fever. HENT: Negative for congestion, ear pain, rhinorrhea and sore throat. Eyes: Negative for visual disturbance. Respiratory: Negative for cough and shortness of breath. Cardiovascular: Positive for leg swelling. Negative for chest pain and palpitations. Gastrointestinal: Positive for abdominal pain (RLQ). Negative for constipation, diarrhea, nausea and vomiting. Genitourinary: Negative for difficulty urinating and dysuria. Musculoskeletal: Negative for arthralgias and myalgias. Skin: Negative for rash.    Neurological: Negative for dizziness, weakness and

## 2019-06-19 NOTE — CARE COORDINATION
by complexity of regimen and stress  Plan for overcoming my barriers: Work with [facility] for accountability and will obtain a new scale or the one from pt home. Confidence: 6/10  Anticipated Goal Completion Date: 3/30/2019    6/19/19: Pt is not weighing self. Wt log given and reviewed CHF zone tool with pt. Pt does report that she has a scale at home. Prior to Admission medications    Medication Sig Start Date End Date Taking? Authorizing Provider   bacitracin 500 UNIT/GM ointment Apply topically 5/29/19   Historical Provider, MD   furosemide (LASIX) 40 MG tablet Take 1 tablet by mouth as needed (lower extremity swelling.) 6/14/19   ROSITA Lama   citalopram (CELEXA) 40 MG tablet TAKE 1 TABLET BY MOUTH ONCE DAILY 4/12/19   ROSITA Lama   ARIPiprazole (ABILIFY) 10 MG tablet Take 5 mg by mouth daily     Dinorah Blanco MD   buPROPion (WELLBUTRIN XL) 300 MG extended release tablet TAKE (1) TABLET BY MOUTH ONCE DAILY 10/25/18   ROSITA Lama   XARELTO 20 MG TABS tablet TAKE (1) TABLET BY MOUTH EVERY 24 HOURS. -PATIENT NEEDS APPOINTMENT FOR FURTHER REFILLS- 9/28/18   ROSITA Lama   valACYclovir (VALTREX) 1 g tablet TAKE 2 TABLETS BY MOUTH EVERY 12 HOURS FOR 2 DAYS 6/25/18   Jason Araujo MD   ondansetron (ZOFRAN ODT) 4 MG disintegrating tablet Take 2 tablets by mouth every 8 hours as needed for Nausea 3/8/18   FEROZ Lindquist Cap - SAI   dextroamphetamine (DEXTROSTAT) 10 MG tablet Take 10 mg by mouth 2 times daily . Sidney Sigala MD   amphetamine-dextroamphetamine (ADDERALL XR) 30 MG extended release capsule Take 30 mg by mouth 2 times daily . Sidney Sigala MD       No future appointments.   ,   Congestive Heart Failure Assessment    Are you currently restricting fluids?:  No Restriction  Do you understand a low sodium diet?:  Yes  Do you understand how to read food labels?:  Yes  How many restaurant meals do you eat per week?:  3-4  Do you salt your food before tasting it?: No         Symptoms:      Patient-reported weight (lb):   (Comment: Has not been weighing self. Started lasix back on 6/18/19)      and   General Assessment    Do you have any symptoms that are causing concern?:  Yes  Progression since Onset:  Unchanged  Reported Symptoms:  Abdominal Pain, Other (Comment: Concerned about abdominal pain and leg swelling.  Reason for appt today.)

## 2019-06-19 NOTE — Clinical Note
Please call patient and let her know that urine culture was positive for bacterial infection, I have sent in antibiotic to the pharmacy.

## 2019-06-20 DIAGNOSIS — D50.9 IRON DEFICIENCY ANEMIA, UNSPECIFIED IRON DEFICIENCY ANEMIA TYPE: Primary | ICD-10-CM

## 2019-06-20 LAB
A/G RATIO: 1.3 (ref 1.1–2.2)
ALBUMIN SERPL-MCNC: 4 G/DL (ref 3.4–5)
ALP BLD-CCNC: 90 U/L (ref 40–129)
ALT SERPL-CCNC: 22 U/L (ref 10–40)
ANION GAP SERPL CALCULATED.3IONS-SCNC: 11 MMOL/L (ref 3–16)
AST SERPL-CCNC: 20 U/L (ref 15–37)
BILIRUB SERPL-MCNC: 0.6 MG/DL (ref 0–1)
BUN BLDV-MCNC: 12 MG/DL (ref 7–20)
CALCIUM SERPL-MCNC: 9.1 MG/DL (ref 8.3–10.6)
CHLORIDE BLD-SCNC: 104 MMOL/L (ref 99–110)
CHOLESTEROL, TOTAL: 162 MG/DL (ref 0–199)
CO2: 27 MMOL/L (ref 21–32)
CREAT SERPL-MCNC: 0.8 MG/DL (ref 0.6–1.1)
GFR AFRICAN AMERICAN: >60
GFR NON-AFRICAN AMERICAN: >60
GLOBULIN: 3.1 G/DL
GLUCOSE BLD-MCNC: 91 MG/DL (ref 70–99)
HDLC SERPL-MCNC: 56 MG/DL (ref 40–60)
LDL CHOLESTEROL CALCULATED: 87 MG/DL
POTASSIUM SERPL-SCNC: 3.8 MMOL/L (ref 3.5–5.1)
SODIUM BLD-SCNC: 142 MMOL/L (ref 136–145)
TOTAL PROTEIN: 7.1 G/DL (ref 6.4–8.2)
TRIGL SERPL-MCNC: 96 MG/DL (ref 0–150)
VLDLC SERPL CALC-MCNC: 19 MG/DL

## 2019-06-22 LAB
ORGANISM: ABNORMAL
URINE CULTURE, ROUTINE: ABNORMAL
URINE CULTURE, ROUTINE: ABNORMAL

## 2019-06-24 ENCOUNTER — CARE COORDINATION (OUTPATIENT)
Dept: CARE COORDINATION | Age: 49
End: 2019-06-24

## 2019-06-24 ENCOUNTER — TELEPHONE (OUTPATIENT)
Dept: FAMILY MEDICINE CLINIC | Age: 49
End: 2019-06-24

## 2019-06-24 RX ORDER — NITROFURANTOIN 25; 75 MG/1; MG/1
100 CAPSULE ORAL 2 TIMES DAILY
Qty: 14 CAPSULE | Refills: 0 | Status: SHIPPED | OUTPATIENT
Start: 2019-06-24 | End: 2019-07-01

## 2019-07-02 ENCOUNTER — CARE COORDINATION (OUTPATIENT)
Dept: CARE COORDINATION | Age: 49
End: 2019-07-02

## 2019-07-16 ENCOUNTER — CARE COORDINATION (OUTPATIENT)
Dept: CARE COORDINATION | Age: 49
End: 2019-07-16

## 2019-08-06 ENCOUNTER — CARE COORDINATION (OUTPATIENT)
Dept: FAMILY MEDICINE CLINIC | Age: 49
End: 2019-08-06

## 2019-08-06 ENCOUNTER — TELEPHONE (OUTPATIENT)
Dept: FAMILY MEDICINE CLINIC | Age: 49
End: 2019-08-06

## 2019-08-09 ENCOUNTER — TELEPHONE (OUTPATIENT)
Dept: FAMILY MEDICINE CLINIC | Age: 49
End: 2019-08-09

## 2019-09-16 ENCOUNTER — NURSE TRIAGE (OUTPATIENT)
Dept: OTHER | Facility: CLINIC | Age: 49
End: 2019-09-16

## 2019-10-02 ENCOUNTER — OFFICE VISIT (OUTPATIENT)
Dept: CARDIOLOGY CLINIC | Age: 49
End: 2019-10-02

## 2019-10-02 VITALS
HEIGHT: 68 IN | WEIGHT: 276.4 LBS | BODY MASS INDEX: 41.89 KG/M2 | SYSTOLIC BLOOD PRESSURE: 104 MMHG | OXYGEN SATURATION: 98 % | HEART RATE: 61 BPM | DIASTOLIC BLOOD PRESSURE: 68 MMHG

## 2019-10-02 DIAGNOSIS — E78.2 MIXED HYPERLIPIDEMIA: ICD-10-CM

## 2019-10-02 DIAGNOSIS — I50.31 ACUTE DIASTOLIC CONGESTIVE HEART FAILURE (HCC): Primary | ICD-10-CM

## 2019-10-02 DIAGNOSIS — I51.7 LVH (LEFT VENTRICULAR HYPERTROPHY): ICD-10-CM

## 2019-10-02 DIAGNOSIS — I10 HYPERTENSION, ESSENTIAL: ICD-10-CM

## 2019-10-02 PROCEDURE — 99214 OFFICE O/P EST MOD 30 MIN: CPT | Performed by: INTERNAL MEDICINE

## 2020-04-22 ENCOUNTER — TELEPHONE (OUTPATIENT)
Dept: BARIATRICS/WEIGHT MGMT | Age: 50
End: 2020-04-22

## 2020-07-16 ENCOUNTER — APPOINTMENT (OUTPATIENT)
Dept: GENERAL RADIOLOGY | Age: 50
End: 2020-07-16
Payer: COMMERCIAL

## 2020-07-16 ENCOUNTER — HOSPITAL ENCOUNTER (EMERGENCY)
Age: 50
Discharge: HOME | End: 2020-07-16
Payer: COMMERCIAL

## 2020-07-16 VITALS
TEMPERATURE: 98.2 F | OXYGEN SATURATION: 98 % | HEART RATE: 86 BPM | DIASTOLIC BLOOD PRESSURE: 71 MMHG | SYSTOLIC BLOOD PRESSURE: 150 MMHG | RESPIRATION RATE: 26 BRPM

## 2020-07-16 LAB
A/G RATIO: 1.1 (ref 1.1–2.2)
ALBUMIN SERPL-MCNC: 3.7 G/DL (ref 3.4–5)
ALP BLD-CCNC: 98 U/L (ref 40–129)
ALT SERPL-CCNC: 32 U/L (ref 10–40)
ANION GAP SERPL CALCULATED.3IONS-SCNC: 10 MMOL/L (ref 3–16)
AST SERPL-CCNC: 44 U/L (ref 15–37)
BASOPHILS ABSOLUTE: 0 K/UL (ref 0–0.2)
BASOPHILS RELATIVE PERCENT: 0.8 %
BILIRUB SERPL-MCNC: 0.5 MG/DL (ref 0–1)
BUN BLDV-MCNC: 7 MG/DL (ref 7–20)
CALCIUM SERPL-MCNC: 9 MG/DL (ref 8.3–10.6)
CHLORIDE BLD-SCNC: 103 MMOL/L (ref 99–110)
CO2: 23 MMOL/L (ref 21–32)
CREAT SERPL-MCNC: 0.7 MG/DL (ref 0.6–1.1)
EOSINOPHILS ABSOLUTE: 0 K/UL (ref 0–0.6)
EOSINOPHILS RELATIVE PERCENT: 1.1 %
GFR AFRICAN AMERICAN: >60
GFR NON-AFRICAN AMERICAN: >60
GLOBULIN: 3.4 G/DL
GLUCOSE BLD-MCNC: 97 MG/DL (ref 70–99)
HCT VFR BLD CALC: 33.5 % (ref 36–48)
HEMOGLOBIN: 11.2 G/DL (ref 12–16)
INR BLD: 1.13 (ref 0.86–1.14)
LACTIC ACID: 0.8 MMOL/L (ref 0.4–2)
LYMPHOCYTES ABSOLUTE: 0.6 K/UL (ref 1–5.1)
LYMPHOCYTES RELATIVE PERCENT: 22.7 %
MCH RBC QN AUTO: 24.9 PG (ref 26–34)
MCHC RBC AUTO-ENTMCNC: 33.3 G/DL (ref 31–36)
MCV RBC AUTO: 74.8 FL (ref 80–100)
MONOCYTES ABSOLUTE: 0.3 K/UL (ref 0–1.3)
MONOCYTES RELATIVE PERCENT: 10.2 %
NEUTROPHILS ABSOLUTE: 1.6 K/UL (ref 1.7–7.7)
NEUTROPHILS RELATIVE PERCENT: 65.2 %
PDW BLD-RTO: 15.7 % (ref 12.4–15.4)
PLATELET # BLD: 182 K/UL (ref 135–450)
PMV BLD AUTO: 7.2 FL (ref 5–10.5)
POTASSIUM SERPL-SCNC: 3.7 MMOL/L (ref 3.5–5.1)
PROTHROMBIN TIME: 13.1 SEC (ref 10–13.2)
RBC # BLD: 4.48 M/UL (ref 4–5.2)
SODIUM BLD-SCNC: 136 MMOL/L (ref 136–145)
TOTAL PROTEIN: 7.1 G/DL (ref 6.4–8.2)
TROPONIN: <0.01 NG/ML
WBC # BLD: 2.5 K/UL (ref 4–11)

## 2020-07-16 PROCEDURE — 85610 PROTHROMBIN TIME: CPT

## 2020-07-16 PROCEDURE — 6370000000 HC RX 637 (ALT 250 FOR IP): Performed by: NURSE PRACTITIONER

## 2020-07-16 PROCEDURE — 85025 COMPLETE CBC W/AUTO DIFF WBC: CPT

## 2020-07-16 PROCEDURE — 99285 EMERGENCY DEPT VISIT HI MDM: CPT

## 2020-07-16 PROCEDURE — 6360000002 HC RX W HCPCS: Performed by: NURSE PRACTITIONER

## 2020-07-16 PROCEDURE — 84484 ASSAY OF TROPONIN QUANT: CPT

## 2020-07-16 PROCEDURE — 96374 THER/PROPH/DIAG INJ IV PUSH: CPT

## 2020-07-16 PROCEDURE — U0003 INFECTIOUS AGENT DETECTION BY NUCLEIC ACID (DNA OR RNA); SEVERE ACUTE RESPIRATORY SYNDROME CORONAVIRUS 2 (SARS-COV-2) (CORONAVIRUS DISEASE [COVID-19]), AMPLIFIED PROBE TECHNIQUE, MAKING USE OF HIGH THROUGHPUT TECHNOLOGIES AS DESCRIBED BY CMS-2020-01-R: HCPCS

## 2020-07-16 PROCEDURE — 36415 COLL VENOUS BLD VENIPUNCTURE: CPT

## 2020-07-16 PROCEDURE — 83605 ASSAY OF LACTIC ACID: CPT

## 2020-07-16 PROCEDURE — 93005 ELECTROCARDIOGRAM TRACING: CPT | Performed by: NURSE PRACTITIONER

## 2020-07-16 PROCEDURE — 94640 AIRWAY INHALATION TREATMENT: CPT

## 2020-07-16 PROCEDURE — 71045 X-RAY EXAM CHEST 1 VIEW: CPT

## 2020-07-16 PROCEDURE — 80053 COMPREHEN METABOLIC PANEL: CPT

## 2020-07-16 RX ORDER — DEXAMETHASONE SODIUM PHOSPHATE 4 MG/ML
10 INJECTION, SOLUTION INTRA-ARTICULAR; INTRALESIONAL; INTRAMUSCULAR; INTRAVENOUS; SOFT TISSUE ONCE
Status: COMPLETED | OUTPATIENT
Start: 2020-07-16 | End: 2020-07-16

## 2020-07-16 RX ORDER — IPRATROPIUM BROMIDE AND ALBUTEROL SULFATE 2.5; .5 MG/3ML; MG/3ML
1 SOLUTION RESPIRATORY (INHALATION) ONCE
Status: COMPLETED | OUTPATIENT
Start: 2020-07-16 | End: 2020-07-16

## 2020-07-16 RX ORDER — ALBUTEROL SULFATE 90 UG/1
AEROSOL, METERED RESPIRATORY (INHALATION)
Qty: 1 INHALER | Refills: 0 | Status: SHIPPED | OUTPATIENT
Start: 2020-07-16

## 2020-07-16 RX ADMIN — DEXAMETHASONE SODIUM PHOSPHATE 10 MG: 4 INJECTION, SOLUTION INTRAMUSCULAR; INTRAVENOUS at 19:04

## 2020-07-16 RX ADMIN — IPRATROPIUM BROMIDE AND ALBUTEROL SULFATE 1 AMPULE: .5; 3 SOLUTION RESPIRATORY (INHALATION) at 19:09

## 2020-07-16 ASSESSMENT — PAIN SCALES - GENERAL: PAINLEVEL_OUTOF10: 8

## 2020-07-16 ASSESSMENT — PAIN DESCRIPTION - DESCRIPTORS: DESCRIPTORS: ACHING

## 2020-07-16 ASSESSMENT — PAIN DESCRIPTION - FREQUENCY: FREQUENCY: CONTINUOUS

## 2020-07-16 ASSESSMENT — PAIN DESCRIPTION - ORIENTATION: ORIENTATION: LOWER

## 2020-07-16 ASSESSMENT — PAIN DESCRIPTION - LOCATION: LOCATION: BACK

## 2020-07-16 ASSESSMENT — PAIN DESCRIPTION - PAIN TYPE: TYPE: CHRONIC PAIN

## 2020-07-16 NOTE — ED PROVIDER NOTES
Evaluated by Advanced Practice Provider    EMERGENCY DEPARTMENT ENCOUNTER      CHIEFCOMPLAINT  Shortness of Breath (A&Ox4 pt arrives ambulatory to ED with c/o sob, fever of 101.0, and fatigue worsening throughout today. Hx of PE. Voice hoarse in triage. breathing labored. 99% on room air in triage. ) and Fever    HPI    Rita Muniz is a 45 y.o. female who presents to the emergency department with complaints of shortness of breath. Episodes of SOB and trouble breathing, started yesterday but got really bad today. Has not been feeling well-fatigued since last week. Temp at home up to 101.9 today. She took her usual percocet 7.5 at 9 am, nothing else for fever. SHe is coughing, it is productive at times, can not describe it. She denies CP, abdominal pain, vomiting, diarrhea. Reports sore throat, nasal congestion and nausea. She does have a history of PE and is on xarelto, there is damage to her left lung from the PE. No known COVID contacts, daughter has some congestion and cough but not as severe as patient. She denies chronic lung history and was never smoker. Patient denies any chest pain, patient denies abdominal pain, nausea, vomiting, diarrhea. Patient denies any fever or chills. Thepatient is currently rating their pain as 8/10 and describes it as an aching-back, chronic type of pain. Treatments tried prior to arrival in the ED include: none. The patient denies other complaints, modifying factors orassociated symptoms. The patient arrived to the ED via private car.     PAST MEDICAL HISTORY    Past Medical History:   Diagnosis Date    ADHD (attention deficit hyperactivity disorder)     Adopted     Blood transfusion     GI BLEEDING-COUMADIN    CHF (congestive heart failure) (HCC)     Degenerative disc disease     Dental disease     Dizziness     DVT (deep venous thrombosis) (HCC)     Fibromyalgia     Headache     Hearing loss     History of blood transfusion     Hx of blood clots 2004    PE and DVT    Hypertension     controlled lately    Hypertrophic cardiomyopathy ([address]     mild, echo 2016    Hypoglycemia 2012    PAST HISTORY    Lupus anticoagulant disorder ([address]     Narcolepsy     Nosebleed     PE (pulmonary embolism)     Pneumonia     Pulmonary edema 03/2016    Rheumatoid arthritis ([facility]     Sleep apnea     CPAP    TMJ dysfunction        SURGICAL HISTORY    Past Surgical History:   Procedure Laterality Date    CHOLECYSTECTOMY, LAPAROSCOPIC      FOOT SURGERY      bone spur  - rt foot    HYSTEROSCOPY  05/25/2017    D & C    KNEE ARTHROPLASTY Left 08/14/2018    KNEE ARTHROSCOPY Left 2006    MENISCUS    KNEE ARTHROSCOPY Left 09/09/2016    MENISCUS    AR TOTAL KNEE ARTHROPLASTY Left 8/14/2018    LEFT TOTAL KNEE REPLACEMENT WITH PLATELET RICH PLASMA REYES & NEPHEW LEGION OX XLPE performed by Michael Stark MD at [place] [address]  11/29/2017    LAPAROSCOPIC SLEEVE GASTRECTOMY-ETHICON, LIVER BIOPSY    UMBILICAL HERNIA REPAIR  11/05/2012    LAPAROSCOPIC WITH PHYSIO MESH       CURRENT MEDICATIONS    Current Outpatient Rx   Medication Sig Dispense Refill    albuterol sulfate HFA (PROAIR HFA) 108 (90 Base) MCG/ACT inhaler Use 1-2 puffs every 4 hours while awake as needed for cough/wheezing  Dispense with SPACER and Instruct on use. May sub Ventolin or Proventil as needed per [employer]. 1 Inhaler 0    bacitracin 500 UNIT/GM ointment Apply topically      furosemide (LASIX) 40 MG tablet Take 1 tablet by mouth as needed (lower extremity swelling.) 30 tablet 2    citalopram (CELEXA) 40 MG tablet TAKE 1 TABLET BY MOUTH ONCE DAILY 30 tablet 10    ARIPiprazole (ABILIFY) 10 MG tablet Take 5 mg by mouth daily       buPROPion (WELLBUTRIN XL) 300 MG extended release tablet TAKE (1) TABLET BY MOUTH ONCE DAILY 30 tablet 11    XARELTO 20 MG TABS tablet TAKE (1) TABLET BY MOUTH EVERY 24 HOURS.  -PATIENT NEEDS APPOINTMENT FOR FURTHER REFILLS- 30 tablet 10    valACYclovir (VALTREX) 1 g tablet TAKE 2 TABLETS BY MOUTH EVERY 12 HOURS FOR 2 DAYS 8 tablet 1    ondansetron (ZOFRAN ODT) 4 MG disintegrating tablet Take 2 tablets by mouth every 8 hours as needed for Nausea 30 tablet 1    dextroamphetamine (DEXTROSTAT) 10 MG tablet Take 10 mg by mouth 2 times daily .  amphetamine-dextroamphetamine (ADDERALL XR) 30 MG extended release capsule Take 30 mg by mouth 2 times daily .          ALLERGIES    Allergies   Allergen Reactions    Rocephin [Ceftriaxone Sodium In Dextrose] Hives     ITCH    Lisinopril Other (See Comments)     coughing       FAMILY HISTORY    Family History   Adopted: Yes       SOCIAL HISTORY    Social History     Socioeconomic History    Marital status:      Spouse name: None    Number of children: None    Years of education: None    Highest education level: None   Occupational History    None   Social Needs    Financial resource strain: None    Food insecurity     Worry: None     Inability: None    Transportation needs     Medical: None     Non-medical: None   Tobacco Use    Smoking status: Never Smoker    Smokeless tobacco: Never Used   Substance and Sexual Activity    Alcohol use: Yes     Comment: rarely    Drug use: No     Comment: on chronic percocet for back pain     Sexual activity: Not Currently   Lifestyle    Physical activity     Days per week: None     Minutes per session: None    Stress: None   Relationships    Social connections     Talks on phone: None     Gets together: None     Attends [employer] service: None     Active member of club or organization: None     Attends meetings of clubs or organizations: None     Relationship status: None    Intimate partner violence     Fear of current or ex partner: None     Emotionally abused: None     Physically abused: None     Forced sexual activity: None   Other Topics Concern    None   Social History Narrative    None       REVIEW OF SYSTEMS    10 systems reviewed, pertinent positives per HPI otherwise noted to be negative    PHYSICAL EXAM  Physical Exam  Vitals:    07/16/20 2100   BP: (!) 150/71   Pulse: 86   Resp: 26   Temp:    SpO2: 98%       GENERAL: Patient is well-developed, well-nourished. Awake and alert. Cooperative. Resting in bed. No apparent distress. HEENT:  Normocephalic, atraumatic. Conjunctiva appear normal. Sclera is non-icteric. External ears are normal. POP with mild erythema, no exudate or edema. NECK: Supple with normal ROM. Trachea midline  LUNGS: Equal and symmetric chest rise. Breathing is unlabored. Speaking comfortably in full sentences. Lungs are clear bilaterally to auscultation. Without wheezing, rales, or rhonchi. Voice is hoarse sounding. CADIOVASCULAR:  Regular rate and rhythm. Normal S1-S2 sounds. No murmurs, rubs, or gallops. Capillary refill is brisk in all 4extremities. Bilateral lower extremities are equal in size, there is +1-2 lower extremity edema observed, right > left, right also with erythema. There is no tenderness to palpation. There is no erythema observed or warmth palpated. GI: Soft, nontender, nondistended with positive bowelsounds. No rebound tenderness, guarding or any peritoneal signs. No masses or hepatosplenomegaly   MUSCULOSKELETAL:  No gross deformities or trauma noted. Moving allextremities equally and appropriately. Normal ROM. SKIN: Warm/dry. Skin is intact. Norashes/lesions noted. PSYCHIATRIC: Mood and affect appropriate. Speech is clear andarticulate. NEUROLOGIC: Alert and oriented. No focal motor or sensory deficits. LABS  I havereviewed all labs for this visit.    Results for orders placed or performed during the hospital encounter of 07/16/20   CBC Auto Differential   Result Value Ref Range    WBC 2.5 (L) 4.0 - 11.0 K/uL    RBC 4.48 4.00 - 5.20 M/uL    Hemoglobin 11.2 (L) 12.0 - 16.0 g/dL    Hematocrit 33.5 (L) 36.0 - 48.0 %    MCV 74.8 (L) 80.0 - 100.0 fL    MCH 24.9 (L) 26.0 - 34.0 pg MCHC 33.3 31.0 - 36.0 g/dL    RDW 15.7 (H) 12.4 - 15.4 %    Platelets 820 888 - 016 K/uL    MPV 7.2 5.0 - 10.5 fL    Neutrophils % 65.2 %    Lymphocytes % 22.7 %    Monocytes % 10.2 %    Eosinophils % 1.1 %    Basophils % 0.8 %    Neutrophils Absolute 1.6 (L) 1.7 - 7.7 K/uL    Lymphocytes Absolute 0.6 (L) 1.0 - 5.1 K/uL    Monocytes Absolute 0.3 0.0 - 1.3 K/uL    Eosinophils Absolute 0.0 0.0 - 0.6 K/uL    Basophils Absolute 0.0 0.0 - 0.2 K/uL   Comprehensive Metabolic Panel   Result Value Ref Range    Sodium 136 136 - 145 mmol/L    Potassium 3.7 3.5 - 5.1 mmol/L    Chloride 103 99 - 110 mmol/L    CO2 23 21 - 32 mmol/L    Anion Gap 10 3 - 16    Glucose 97 70 - 99 mg/dL    BUN 7 7 - 20 mg/dL    CREATININE 0.7 0.6 - 1.1 mg/dL    GFR Non-African American >60 >60    GFR African American >60 >60    Calcium 9.0 8.3 - 10.6 mg/dL    Total Protein 7.1 6.4 - 8.2 g/dL    Alb 3.7 3.4 - 5.0 g/dL    Albumin/Globulin Ratio 1.1 1.1 - 2.2    Total Bilirubin 0.5 0.0 - 1.0 mg/dL    Alkaline Phosphatase 98 40 - 129 U/L    ALT 32 10 - 40 U/L    AST 44 (H) 15 - 37 U/L    Globulin 3.4 g/dL   Troponin   Result Value Ref Range    Troponin <0.01 <0.01 ng/mL   Lactic Acid, Plasma   Result Value Ref Range    Lactic Acid 0.8 0.4 - 2.0 mmol/L   Protime-INR   Result Value Ref Range    Protime 13.1 10.0 - 13.2 sec    INR 1.13 0.86 - 1.14   EKG 12 Lead   Result Value Ref Range    Ventricular Rate 77 BPM    Atrial Rate 77 BPM    P-R Interval 140 ms    QRS Duration 82 ms    Q-T Interval 386 ms    QTc Calculation (Bazett) 436 ms    P Axis 34 degrees    R Axis -20 degrees    T Axis 13 degrees    Diagnosis       Normal sinus rhythmNormal ECGWhen compared with ECG of 31-JUL-2018 11:17,No significant change was found       RADIOLOGY    Xr Chest Portable    Result Date: 7/16/2020  EXAMINATION: ONE XRAY VIEW OF THE CHEST 7/16/2020 6:05 pm COMPARISON: 09/27/2017 HISTORY: ORDERING SYSTEM PROVIDED HISTORY: sob TECHNOLOGIST PROVIDED HISTORY: Reason for exam:->sob Reason for Exam: SOB Acuity: Acute Type of Exam: Initial FINDINGS: A single frontal view of the chest was performed. There is no acute skeletal abnormality. There is stable mild chronic elevation of the right hemidiaphragm. The heart size is mildly enlarged, though stable. The mediastinal contours are stable, and within normal limits. There is chronic pulmonary vascular congestion, without overt edema. No focus of acute airspace consolidation is identified. There is no evidence of a pneumothorax. 1. Stable mild cardiomegaly and mild chronic pulmonary vascular congestion, without acute airspace consolidation or overt CHF. 2. Stable nonspecific chronic elevation of the right hemidiaphragm. ED COURSE/MDM  Patient seen and evaluated. Old records reviewed. Diagnostic testing reviewed and results discussed. I have evaluated this patient. My supervising physician was available for consultation. Tanesha Castanon presented to the ED with the above noted complaints. Physical exam reveals no adventitious breath sounds on exam. While in the ER she is afebrile and hemodynamically stable. She is well saturated on RA prior to breathing treatments given. Blood work shows a leukopenia as WBC are low at 2.5, review of records shows patient has recently run low normal.  Absolute neutrophils are low at 1.6, absolute lymphocytes low at 0.6. I do feel that this could be consistent with a viral infection that the patient currently has. There is a stable anemia. There is no electrolyte abnormality. No evidence of acute kidney injury or transaminitis, AST is slightly elevated at 44. PT/INR is within normal limits. Patient is on anticoagulants for history of PE. Chest x-ray shows stable cardia megaly and mild chronic pulmonary vascular congestion without focal airspace consolidation or overt CHF. Stable nonspecific chronic elevation of the right hemidiaphragm.     Pt was given the following verbalized their understanding and agreement with the above plan. I estimate there is LOW risk for PULMONARY EMBOLISM, ACUTE CORONARY SYNDROME, OR THORACIC AORTIC DISSECTION, thus I consider the discharge disposition reasonable. Kaila Pineda and I have discussed the diagnosisand risks, and we agree with discharging home to follow-up with their primary doctor. We also discussed returning to the Emergency Department immediately if new or worsening symptoms occur. We have discussed the symptomswhich are most concerning (e.g., bloody sputum, fever, worsening pain or shortness of breath, vomiting) that necessitate immediate return. was sent home with a prescription for below medication/s. I did [place] patient on appropriate use of these medication. Discharge Medication List as of 7/16/2020  8:34 PM      START taking these medications    Details   albuterol sulfate HFA (PROAIR HFA) 108 (90 Base) MCG/ACT inhaler Use 1-2 puffs every 4 hours while awake as needed for cough/wheezing  Dispense with SPACER and Instruct on use. May sub Ventolin or Proventil as needed per Insurance., Disp-1 Inhaler,R-0Print               CLINICAL IMPRESSION    1. URI with cough and congestion    2. Laryngitis           Discharge Vitals:  Blood pressure (!) 150/71, pulse 86, temperature 98.2 °F (36.8 °C), temperature source Oral, resp. rate 26, SpO2 98 %, not currently breastfeeding. FOLLOW UP  Anthony Dunn MD  2020 [address]  [phone]    Schedule an appointment as soon as possible for a visit in 1 week  For further evaluation    [facility]  ED  [address]  Go to   If symptoms worsen      DISPOSITION  Patient was discharged to home in good condition.     Comment: Please note this report has been produced using speech recognition software and may contain errors related to that system including errorsin grammar, punctuation, and spelling, as well as

## 2020-07-16 NOTE — ED TRIAGE NOTES
A&Ox4 pt arrives ambulatory to ED with c/o sob, fever of 101.0, and fatigue worsening throughout today. Hx of PE. Voice hoarse in triage. breathing labored. 99% on room air in triage.

## 2020-07-17 ENCOUNTER — CARE COORDINATION (OUTPATIENT)
Dept: CARE COORDINATION | Age: 50
End: 2020-07-17

## 2020-07-17 LAB
EKG ATRIAL RATE: 77 BPM
EKG DIAGNOSIS: NORMAL
EKG P AXIS: 34 DEGREES
EKG P-R INTERVAL: 140 MS
EKG Q-T INTERVAL: 386 MS
EKG QRS DURATION: 82 MS
EKG QTC CALCULATION (BAZETT): 436 MS
EKG R AXIS: -20 DEGREES
EKG T AXIS: 13 DEGREES
EKG VENTRICULAR RATE: 77 BPM

## 2020-07-17 PROCEDURE — 93010 ELECTROCARDIOGRAM REPORT: CPT | Performed by: INTERNAL MEDICINE

## 2020-07-17 NOTE — ED NOTES
--Patient provided with discharge instructions. --Instructions, and follow-up appointments reviewed with patient/family. No further questions or needs at this time. --Vital signs and patient stable upon discharge. --Patient ambulatory to [facility].     -iv dc and dsd applied to site bleeding controlled     Christen Brian RN  07/16/20 [id number]

## 2020-07-17 NOTE — CARE COORDINATION
Patient contacted regarding Radames Caballero. Discussed COVID-19 related testing which was pending at this time. Care Transition Nurse/ Ambulatory Care Manager contacted the patient by telephone to perform post discharge assessment. Verified name and  with patient as identifiers. Provided introduction to self, and explanation of the CTN/ACM role, and reason for call due to risk factors for infection and/or exposure to COVID-19. Symptoms reviewed with patient who verbalized the following symptoms: fatigue, cough, shortness of breath, loss of taste or smell, chills or shaking, sweating, nausea, diarrhea, no new symptoms, no worsening symptoms and loss of smell and taste. Due to no new or worsening symptoms encounter was not routed to provider for escalation. Discussed follow-up appointments. [facility] follow up appointment(s): No future appointments. Non-[facility] follow up appointment(s): pcp appointment vv at 2pm  Has back pain specialist appointment. Sleep apnea specialist.      Advance Care Planning:   Does patient have an Advance Directive:  patient declined education. Patient has following risk factors of: heart failure, immunocompromised and lupus, history of pe. CTN/ACM reviewed discharge instructions, medical action plan and red flags such as increased shortness of breath, increasing fever and signs of decompensation with patient who verbalized understanding. Discussed exposure protocols and quarantine with CDC Guidelines What to do if you are sick with coronavirus disease .  Patient was given an opportunity for questions and concerns. The patient agrees to contact the [facility] exposure line [phone], local [facility] department [place] [facility]: ([phone]) and PCP office for questions related to their healthcare. CTN/ACM provided contact information for future needs.     Reviewed and educated patient on any new and changed medications related to discharge diagnosis

## 2020-07-19 LAB
SARS-COV-2: DETECTED
SOURCE: ABNORMAL

## 2020-07-20 ENCOUNTER — CARE COORDINATION (OUTPATIENT)
Dept: CARE COORDINATION | Age: 50
End: 2020-07-20

## 2020-07-20 NOTE — PROGRESS NOTES
7/20/20 Positive Covid result on chart after testing during ED encounter on 7/16/20. Message left for ED attending this am,  Care Coordination did reach out to pt today with test result. Communicated with [facility], e faxed note and result.  JOCELINE Hicks IP
declines

## 2020-07-20 NOTE — CARE COORDINATION
Patient contacted regarding COVID-19 risk and screening. Discussed COVID-19 related testing which was available at this time. Test results were positive. Patient informed of results, if available? Yes. The patient reached out to this RN to discuss her medical condition. She provided permission to speak with her friend Summer Mock, listed in her  Address book. Verified name and  with patient as identifiers. Patient has following risk factors of: Obesity, heart failure, immunocompromised and lupus, history of pe. Symptoms reviewed with patient who verbalized the following symptoms: shortness of breath. Reviewed symptoms and treatment with patient and her friend Reyes Seals, who stated that patient is a poor historian and that her daughter and son live in the home with her. She states that daughter is bedridden and son is not supportive. States that son is still working in the community and not complying with quarantine and testing. States that another daughter lives within several miles of the home. Reviewed the ED order to follow up with pcp asap and that patient has vv scheduled for tomorrow. Reminded RN had instructed to inform her cardiologist and pulmonologist of her condition and results of covid test (which we now know are positive). Reviewed orders for albuteral inhaler, which Reyes Seals does not know if patient had filled and has a transportation issue to  prescriptions. Educated that many local pharmacies will fill medications and deliver to the home free of charge. Educated on condition to get additional medical attention/when to return to the ED. Aaliyah Maceo to make a conference call with patient to pcp office to review symptoms. Educated Reyes Seals on [place] loop and how to access, as link has already been sent to patient email. Made certain that patient and friend Reeys Seals both have RN contact information, understand they can leave a confidential vm for RN.        Due to continued symptoms, call placed to BODØ at pcp office to inform of symptoms, recent ED visit, positive covid test results, barriers to getting medications and maintaining quarantine. They were unable to confirm vv scheduled for tomorrow. BODØ agreed to send message to pcp about condition and barriers. Education provided regarding infection prevention, and signs and symptoms of COVID-19 and when to seek medical attention with patient who verbalized understanding. Discussed exposure protocols and quarantine from [address] you at higher risk for severe illness 2019 and given an opportunity for questions and concerns. The patient agrees to contact the COVID-19 hotline [phone] or PCP office for questions related to their healthcare. CTN/ACM provided contact information for future reference. From CDC: Are you at higher risk for severe illness?  Wash your hands often.  Avoid close contact (6 feet, which is about two arm lengths) with people who are sick.  Put distance between yourself and other people if COVID-19 is spreading in your community.  Clean and disinfect frequently touched surfaces.  Avoid all cruise travel and non-essential air travel.  Call your healthcare professional if you have concerns about COVID-19 and your underlying condition or if you are sick. For more information on steps you can take to protect yourself, see CDC's How to Protect Yourself      Pt will be further monitored by COVID Loop Team based on severity of symptoms and risk factors.

## 2020-07-20 NOTE — TELEPHONE ENCOUNTER
Saw patient for preoperative clearance in the past, last seen 2017  Patient is follow-up with PCP  Happy to see patient if PCP feels patient needs to see a pulmonologist

## 2020-09-11 ENCOUNTER — NURSE ONLY (OUTPATIENT)
Dept: PRIMARY CARE CLINIC | Age: 50
End: 2020-09-11
Payer: COMMERCIAL

## 2020-09-11 PROCEDURE — 99211 OFF/OP EST MAY X REQ PHY/QHP: CPT | Performed by: NURSE PRACTITIONER

## 2020-09-12 LAB — SARS-COV-2, NAA: NOT DETECTED

## 2020-09-16 ENCOUNTER — HOSPITAL ENCOUNTER (OUTPATIENT)
Age: 50
Setting detail: OUTPATIENT SURGERY
Discharge: HOME | End: 2020-09-16
Attending: INTERNAL MEDICINE | Admitting: INTERNAL MEDICINE
Payer: COMMERCIAL

## 2020-09-16 VITALS
HEIGHT: 68 IN | HEART RATE: 66 BPM | BODY MASS INDEX: 40.92 KG/M2 | SYSTOLIC BLOOD PRESSURE: 135 MMHG | OXYGEN SATURATION: 100 % | RESPIRATION RATE: 18 BRPM | DIASTOLIC BLOOD PRESSURE: 80 MMHG | TEMPERATURE: 97.2 F | WEIGHT: 270 LBS

## 2020-09-16 LAB — PREGNANCY, URINE: NEGATIVE

## 2020-09-16 PROCEDURE — 6360000002 HC RX W HCPCS: Performed by: INTERNAL MEDICINE

## 2020-09-16 PROCEDURE — 2709999900 HC NON-CHARGEABLE SUPPLY: Performed by: INTERNAL MEDICINE

## 2020-09-16 PROCEDURE — 2580000003 HC RX 258: Performed by: INTERNAL MEDICINE

## 2020-09-16 PROCEDURE — 84703 CHORIONIC GONADOTROPIN ASSAY: CPT

## 2020-09-16 PROCEDURE — 7100000010 HC PHASE II RECOVERY - FIRST 15 MIN: Performed by: INTERNAL MEDICINE

## 2020-09-16 PROCEDURE — 7100000011 HC PHASE II RECOVERY - ADDTL 15 MIN: Performed by: INTERNAL MEDICINE

## 2020-09-16 PROCEDURE — 3609010300 HC COLONOSCOPY W/BIOPSY SINGLE/MULTIPLE: Performed by: INTERNAL MEDICINE

## 2020-09-16 PROCEDURE — 88305 TISSUE EXAM BY PATHOLOGIST: CPT

## 2020-09-16 RX ORDER — FENTANYL CITRATE 50 UG/ML
INJECTION, SOLUTION INTRAMUSCULAR; INTRAVENOUS PRN
Status: DISCONTINUED | OUTPATIENT
Start: 2020-09-16 | End: 2020-09-16 | Stop reason: ALTCHOICE

## 2020-09-16 RX ORDER — MIDAZOLAM HYDROCHLORIDE 1 MG/ML
INJECTION INTRAMUSCULAR; INTRAVENOUS PRN
Status: DISCONTINUED | OUTPATIENT
Start: 2020-09-16 | End: 2020-09-16 | Stop reason: ALTCHOICE

## 2020-09-16 RX ORDER — OXYCODONE AND ACETAMINOPHEN 7.5; 325 MG/1; MG/1
1 TABLET ORAL EVERY 6 HOURS PRN
COMMUNITY

## 2020-09-16 RX ORDER — SODIUM CHLORIDE, SODIUM LACTATE, POTASSIUM CHLORIDE, CALCIUM CHLORIDE 600; 310; 30; 20 MG/100ML; MG/100ML; MG/100ML; MG/100ML
INJECTION, SOLUTION INTRAVENOUS CONTINUOUS
Status: DISCONTINUED | OUTPATIENT
Start: 2020-09-16 | End: 2020-09-16 | Stop reason: HOSPADM

## 2020-09-16 RX ADMIN — SODIUM CHLORIDE, POTASSIUM CHLORIDE, SODIUM LACTATE AND CALCIUM CHLORIDE: 600; 310; 30; 20 INJECTION, SOLUTION INTRAVENOUS at 11:16

## 2020-09-16 ASSESSMENT — PAIN - FUNCTIONAL ASSESSMENT
PAIN_FUNCTIONAL_ASSESSMENT: 0-10
PAIN_FUNCTIONAL_ASSESSMENT: 0-10
PAIN_FUNCTIONAL_ASSESSMENT: FACES
PAIN_FUNCTIONAL_ASSESSMENT: 0-10
PAIN_FUNCTIONAL_ASSESSMENT: FACES

## 2020-09-16 NOTE — BRIEF OP NOTE
Brief Postoperative Note      Patient: Cameron Heath  YOB: 1970  MRN: [id number]    Date of Procedure: 9/16/2020    Pre-Op Diagnosis: Screen for colon cancer [Z12.11]    Post-Op Diagnosis: Same       Procedure(s):  COLONOSCOPY WITH BIOPSY    Surgeon(s):  Lolis Banegas MD    Assistant:  * No surgical staff found *    Anesthesia: IV Sedation    Estimated Blood Loss (mL): Minimal    Complications: None    Specimens:   ID Type Source Tests Collected by Time Destination   A : ASCENDING  COLON POLYP BX Tissue Colon SURGICAL PATHOLOGY Lolis Banegas MD 9/16/2020 1222        Implants:  * No implants in log *      Drains: * No LDAs found *    Findings: polyp    Electronically signed by Luis A Rodriguez MD on 9/16/2020 at 12:29 PM

## 2020-09-16 NOTE — PROGRESS NOTES
Received report from Cecilia Raygoza, [address] [place] [address] at 2416. Upon walking into room, pt resting with eyes closed. This RN asked pt if she was ready to get dressed, and she stated she wanted to rest some more before getting dressed. Pt breathing easily on RA. No distress exhibited. Call light within reach. Stat Creat entered for CT

## 2020-09-16 NOTE — H&P
History and Physical / Pre-Sedation Assessment    Kaila Pineda is a 45 y.o. female who presents today for colonoscopy procedure. PMHx:    Past Medical History:   Diagnosis Date    ADHD (attention deficit hyperactivity disorder)     Adopted     Blood transfusion     GI BLEEDING-COUMADIN    [facility]estive [facility] failure) [facility]     COVID-19 07/16/2020    Degenerative disc disease     Dental disease     Dizziness     D[facility] ([facility] venous thrombosis) ([facility]     Fibromyalgia     Headache     Hearing loss     History of blood transfusion     Hx of blood clots 2004    PE and DVT    Hypertension     controlled lately    Hypertrophic cardiomyopathy ([facility] [address] [facility]     mild, echo 2016    Hypoglycemia 2012    PAST HISTORY    Lupus anticoagulant disorder ([facility] [facility]     Narcolepsy     Nosebleed     PE (pulmonary embolism)     Pneumonia     Pulmonary edema 03/2016    Rheumatoid arthritis ([facility] [address] [facility]     Sleep apnea     CPAP    TMJ dysfunction        Medications:    Prior to Admission medications    Medication Sig Start Date End Date Taking? Authorizing Provider   oxyCODONE-acetaminophen (PERCOCET) 7.5-325 MG per tablet Take 1 tablet by mouth every 6 hours as needed for Pain (back pain). Yes Historical Provider, MD   albuterol sulfate HFA (PROAIR HFA) 108 (90 Base) MCG/ACT inhaler Use 1-2 puffs every 4 hours while awake as needed for cough/wheezing  Dispense with SPACER and Instruct on use. May sub Ventolin or Proventil as needed per [employer].  7/16/20  Yes FEROZ Watkins - CNP   furosemide (LASIX) 40 MG tablet Take 1 tablet by mouth as needed (lower extremity swelling.) 6/19/19  Yes ROSITA Patel   citalopram (CELEXA) 40 MG tablet TAKE 1 TABLET BY MOUTH ONCE DAILY 4/12/19  Yes ROSITA Patel   buPROPion (WELLBUTRIN XL) 300 MG extended release tablet TAKE (1) TABLET BY MOUTH ONCE DAILY 10/25/18  Yes ROSITA Patel   valACYclovir (VALTREX) 1 g tablet TAKE 2 TABLETS BY MOUTH EVERY 12 HOURS FOR 2 DAYS 6/25/18  Yes Jesús Christianson MD   dextroamphetamine (DEXTROSTAT) 10 MG tablet Take 10 mg by mouth 2 times daily . Yes Chloé Mercedes MD   amphetamine-dextroamphetamine (ADDERALL XR) 30 MG extended release capsule Take 30 mg by mouth 2 times daily . Yes Chloé Mercedes MD   bacitracin 500 UNIT/GM ointment Apply topically 5/29/19   Historical Provider, MD   ARIPiprazole (ABILIFY) 10 MG tablet Take 5 mg by mouth daily     Jimenez Sands MD   XARELTO 20 MG TABS tablet TAKE (1) TABLET BY MOUTH EVERY 24 HOURS. -PATIENT NEEDS APPOINTMENT FOR FURTHER REFILLS- 9/28/18   ROSITA Martin   ondansetron (ZOFRAN ODT) 4 MG disintegrating tablet Take 2 tablets by mouth every 8 hours as needed for Nausea 3/8/18   FEROZ Delgado - CNP       Allergies:    Allergies   Allergen Reactions    Rocephin [Ceftriaxone Sodium In Dextrose] Hives     ITCH    Lisinopril Other (See Comments)     coughing       PSHx:    Past Surgical History:   Procedure Laterality Date    CHOLECYSTECTOMY, LAPAROSCOPIC      FOOT SURGERY      bone spur  - rt foot    HYSTEROSCOPY  05/25/2017    D & C    KNEE ARTHROPLASTY Left 08/14/2018    KNEE ARTHROSCOPY Left 2006    MENISCUS    KNEE ARTHROSCOPY Left 09/09/2016    MENISCUS    NE TOTAL KNEE ARTHROPLASTY Left 8/14/2018    LEFT TOTAL KNEE REPLACEMENT WITH PLATELET RICH PLASMA REYES & NEPH LEGION OX XLPE performed by Domitila Coates MD at [place] [address]  11/29/2017    LAPAROSCOPIC SLEEVE GASTRECTOMY-ETHICON, LIVER BIOPSY    UMBILICAL HERNIA REPAIR  11/05/2012    LAPAROSCOPIC WITH PHYSIO MESH       Social Hx:    Social History     Socioeconomic History    Marital status:      Spouse name: Not on file    Number of children: Not on file    Years of education: Not on file    Highest education level: Not on file   Occupational History    Not on file   Social Needs    Financial resource strain: Not on file    Food insecurity     Worry: Not on file     Inability: Not on file    Transportation needs     Medical: Not on file     Non-medical: Not on file   Tobacco Use    Smoking status: Never Smoker    Smokeless tobacco: Never Used   Substance and Sexual Activity    Alcohol use: Yes     Comment: rarely    Drug use: No     Comment: on chronic percocet for back pain     Sexual activity: Not Currently   Lifestyle    Physical activity     Days per week: Not on file     Minutes per session: Not on file    Stress: Not on file   Relationships    Social connections     Talks on phone: Not on file     Gets together: Not on file     Attends Gnosticist service: Not on file     Active member of club or organization: Not on file     Attends meetings of clubs or organizations: Not on file     Relationship status: Not on file    Intimate partner violence     Fear of current or ex partner: Not on file     Emotionally abused: Not on file     Physically abused: Not on file     Forced sexual activity: Not on file   Other Topics Concern    Not on file   Social History Narrative    Not on file       Family Hx:   Family History   Adopted: Yes       Physical Exam:  Vital Signs: BP (!) 148/85   Pulse 68   Temp 96.4 °F (35.8 °C) (Temporal)   Resp 16   Ht 5' 8\" (1.727 m)   Wt 270 lb (122.5 kg)   SpO2 98%   BMI 41.05 kg/m²    Pulmonary: Normal  Cardiac: Normal  Abdomen: Normal    Pre-Procedure Assessment / Plan:  ASA Classification: Class 2 - A normal healthy patient with mild systemic disease  Level of Sedation Plan: Moderate sedation   Mallampati Score:  I (soft palate, uvula, fauces, tonsillar pillars visible)  Post Procedure plan: Return to same level of care    Colonoscopy Interval History:  3 or more years since last colonoscopy, Less than 3 years since the patient's last colonoscopy due to medical reasons and Less than 3 years since the patient's last colonoscopy due to system reasons    Medical Reason for Colonoscopy before 3 years last colonoscopy incomplete, last colonoscopy had

## 2020-09-17 NOTE — PROCEDURES
[facility] [place] [address], [address]                                 PROCEDURE NOTE    PATIENT NAME: Fidelina Andrea                     :        1970  MED REC NO:   [id number]                          ROOM:  ACCOUNT NO:   [de-identified]                           ADMIT DATE: 2020  PROVIDER:     Rosi Santo MD    DATE OF PROCEDURE:  2020    PREOPERATIVE DIAGNOSIS:  Screening for colon cancer. POSTOPERATIVE DIAGNOSIS:  Ascending colon polyp. OPERATION PERFORMED:  Colonoscopy with biopsy. SURGEON:  Rosi Santo MD  EBL NONE    ANESTHESIA:  Demerol 50 mg and Versed 10 mg.    COMPLICATIONS:  None. DESCRIPTION OF PROCEDURE:  Informed consent was obtained after  explaining the risks of bleeding, infection, allergy, perforation,  medical and surgical management as well as nondetection of any colonic  neoplasia. Colonoscope through the anus, advanced to the cecum, normal.  Ascending, transverse, descending, and sigmoid colon demonstrates  ascending colon polyps, removed with forceps technique. Recovery room  in stable condition. IMPRESSION AND PLAN:  We will call with biopsy results and surveillance  colonoscopy recommendations. Continue Xarelto.         Camden Hameed MD    D: 2020 12:31:17       T: 2020 16:16:16     [email or web address]  Job#: [id number]     Doc#: [id number]    CC:  Rosi Santo MD

## 2020-10-12 ENCOUNTER — APPOINTMENT (OUTPATIENT)
Dept: PHYSICAL THERAPY | Age: 50
End: 2020-10-12
Payer: COMMERCIAL

## 2020-10-13 ENCOUNTER — HOSPITAL ENCOUNTER (OUTPATIENT)
Dept: PHYSICAL THERAPY | Age: 50
Setting detail: THERAPIES SERIES
Discharge: HOME | End: 2020-10-13
Payer: COMMERCIAL

## 2020-10-13 ENCOUNTER — APPOINTMENT (OUTPATIENT)
Dept: PHYSICAL THERAPY | Age: 50
End: 2020-10-13
Payer: COMMERCIAL

## 2020-10-13 PROCEDURE — 97162 PT EVAL MOD COMPLEX 30 MIN: CPT

## 2020-10-13 PROCEDURE — 97112 NEUROMUSCULAR REEDUCATION: CPT

## 2020-10-13 ASSESSMENT — PAIN DESCRIPTION - PAIN TYPE: TYPE: CHRONIC PAIN

## 2020-10-13 ASSESSMENT — PAIN DESCRIPTION - ORIENTATION: ORIENTATION: LOWER;RIGHT

## 2020-10-13 ASSESSMENT — PAIN SCALES - GENERAL: PAINLEVEL_OUTOF10: 8

## 2020-10-13 ASSESSMENT — PAIN DESCRIPTION - FREQUENCY: FREQUENCY: CONTINUOUS

## 2020-10-13 ASSESSMENT — PAIN DESCRIPTION - PROGRESSION: CLINICAL_PROGRESSION: NOT CHANGED

## 2020-10-13 NOTE — PROGRESS NOTES
Physical Therapy  Initial Assessment  Date: 10/13/2020  Patient Name: Nata Sparks  MRN: [id number]  : 1970          Restrictions   none per patient/rx    Subjective   General  Chart Reviewed: Yes  Family / Caregiver Present: No  Referring Practitioner: Vance Benítez CNP  Referral Date : 10/09/20  Diagnosis: Lumbar DDD  Follows Commands: Within Functional Limits  PT Visit Information  Onset Date: 10/09/20  PT Insurance Information: [employer] - no stim, no ionto, 30 visits per year  Subjective  Subjective: Pt reports that she has both kinds of arthritis which has caused her back pain to be constant. Worse in the morning and in the evening. Difficulty with sleeping due to positioning. R knee is painful and limiting with stairs and walking. Had Covid in July and is feeling a little better. Pain Screening  Patient Currently in Pain: Yes  Pain Assessment  Pain Level: 8(average for patient)  Pain Type: Chronic pain  Pain Orientation: Lower;Right  Pain Radiating Towards: radiates into the outside  of her R leg all the way to the ankle  Pain Descriptors: Burning; Shooting;Aching;Stabbing  Pain Frequency: Continuous  Clinical Progression: Not changed  Vital Signs  Patient Currently in Pain: Yes    Social/Functional History  Social/Functional History  Occupation: On disability  Leisure & Hobbies: taking care of grandbaby - 2 months old    Objective     Observation/Palpation  Posture: Poor  Observation: gait - pt ambulated with decreased L hip extension, B Trendelenburg, decreased R arm swing, forward flexed and no trunk rotation present, femoral ambulator  Body Mechanics: SLS - LLE weight shift and Trendelenburg noted B, unsteady on R LE; unable to DL squat due to pain in knees    AROM RLE (degrees)  RLE General AROM: hip flexion 90 degrees, hip IR/ER 15 degrees, knee flexion 100, knee extension lacking 10 degrees  AROM LLE (degrees)  LLE General AROM: hip flexion 90 degrees, hip IR/ER 15 degrees, knee flexion 115, knee extension lacking 10 degrees  Spine  Lumbar: flexion WFL, SB R decreased 50%, L decreased 75%, extension decreased 50% with pain during movement  Special Tests: (-) SLR and slump B, (-) Babinski, (-) clonus, 1+ reflexes throughout LE    Strength RLE  Strength RLE: Exception  R Hip Flexion: 3-/5  R Hip Extension: 3-/5  R Hip ABduction: 3+/5  R Hip External Rotation: 3+/5  R Knee Flexion: 4-/5  R Knee Extension: 4/5  R Ankle Dorsiflexion: 4+/5  Strength LLE  L Hip Flexion: 3-/5  L Hip Extension: 3-/5  L Hip ABduction: 4-/5  L Knee Flexion: 4/5  L Knee Extension: 4/5  L Ankle Dorsiflexion: 4+/5  Strength Other  Other: prone glute activation - fair (+) RLE, poor (+) LLE        Sensation  Overall Sensation Status: WNL(to light touch)           Assessment   Conditions Requiring Skilled Therapeutic Intervention  Body structures, Functions, Activity limitations: Decreased functional mobility ; Decreased ADL status; Decreased ROM; Decreased strength;Decreased endurance;Decreased high-level IADLs; Increased pain;Decreased posture  Assessment: Pt presented with poor joint mechanics of the lumbar spine, hips and knees BLE. Pt demonstrated altered muscle firing patterns of lumbar stabilizers and glute complex BLE. Pt to benefit from skilled, outpatient aquatic PT to address deficits and to attain below-stated functional goals.   Prognosis: Good  Decision Making: Medium Complexity  REQUIRES PT FOLLOW UP: Yes  Activity Tolerance  Activity Tolerance: Patient Tolerated treatment well         Plan   Plan  Times per week: 2x/week for 5 weeks to include aquatic therapy, ther-ex, neuro re-ed/balance, postural training, gait training, therapeutic activity, manual therapy, pt education, and modalities PRN    G-Code   Modified Oswestry 60% disability    Goals  Long term goals  Time Frame for Long term goals : 5 weeks  Long term goal 1: Pt will be independent with HEP  Long term goal 2: Pt will tolerate 30 minutes aquatic PT  Long term goal 3: Pt will increase B hip strength to grossly 4/5  Long term goal 4: Pt will improve Modified Oswestry by 7 points to indicate significant change  Long term goal 5: Pt will ambulate with increased hip extension and arm swing B       Therapy Time   Individual Concurrent Group Co-treatment   Time In 0710         Time Out 0745         Minutes 35         Timed Code Treatment Minutes: 10 Minutes       Torres Reyna PT , DPT [id number]      If you have any questions or concerns, please don't hesitate to call.   Thank you for your referral.    Physician Signature:________________________________Date:__________________  By signing above, therapists plan is approved by physician    Please return by fax to [phone]

## 2020-10-13 NOTE — FLOWSHEET NOTE
Physical Therapy Daily Treatment Note    Date:  10/13/2020    Patient Name:  Anupama Frias    :  1970  MRN: [id number]  Restrictions/Precautions:    Medical/Treatment Diagnosis Information:  · Diagnosis: Lumbar DDD  Insurance/Certification information:  PT Insurance Information: [employer] - no stim, no ionto, 30 visits per year  Physician Information:  Referring Practitioner: Abrahan Fairchild CNP  Plan of care signed (Y/N):  N  Visit# / total visits:  1/10     G-Code (if applicable):              Medicare Cap (if applicable):  n/a = total amount used, updated 10/13/2020    Time in:   7:10      Timed Treatment: 10 Total Treatment Time:  35  Time out: 7:45  ________________________________________________________________________________________    Pain Level:    /10  SUBJECTIVE:  See initial eval    OBJECTIVE:     Exercise/Equipment Resistance/Repetitions Other comments          LTR   10x B HEP   TA set  10x5\"  HEP   GS 10x5\" HEP                                                                                                                 Other Therapeutic Activities:  Tour of pool facility and education on indications/contraindications for aquatic PT; Education regarding POC including demonstration with models of anatomy and physiology in order to maximize benefits of treatment; total neuro re-ed 10 minutes    Manual Treatments:         Modalities:      Test/Measurements:  See initial eval       ASSESSMENT:  See initial eval       Treatment/Activity Tolerance:   [x]Patient tolerated treatment well [] Patient limited by fatique  []Patient limited by pain [] Patient limited by other medical complications  [] Other:     Goals:          Long term goals  Time Frame for Long term goals : 5 weeks  Long term goal 1: Pt will be independent with HEP  Long term goal 2: Pt will tolerate 30 minutes aquatic PT  Long term goal 3: Pt will increase B hip strength to grossly 4/5  Long term goal 4: Pt will improve Modified Oswestry by 7 points to indicate significant change  Long term goal 5: Pt will ambulate with increased hip extension and arm swing B     Plan: [] Continue per plan of care [] Alter current plan (see comments)   [x] Plan of care initiated [] Hold pending MD visit [] Discharge      Plan for Next Session:  Aquatic PT    Re-Certification Due Date:         Signature:  Vincent Gardner PT

## 2020-10-13 NOTE — FLOWSHEET NOTE
Physical Therapy Aquatic Flow Sheet  Date:  10/13/2020    Patient Name:  Geronimo Wade    Restrictions:  none  Medical/Treatment Diagnosis Information:  · Diagnosis: Lumbar DDD  Insurance/Certification information:  PT Insurance Information: [employer] - no stim, no ionto, 30 visits per year  Physician Information:  Referring Practitioner: Sari Carranza CNP  Plan of care signed (Y/N):  N  Visit# / total visits:  1/10            Pain level: /10   Electronically signed by:  Danny Araujo PT    Medicare Cap (if applicable):  n/a = total amount used, updated 10/13/2020    Key  B= Belt DB= Dumbells T= Theratube   H= Hydrotone N= Noodles W= Weights   P= Paddles S= Speedo equipment K= Kickboard     Exercises/Activities   Warm-up/Amb    Exercises      Slow forward  nv  HR/TR  nv    Slow sideways  nv  Marches  nv    Slow backwards  nv  Mini-squats  nv    Medium forward    4-way SLR  nv    Medium sideways    Hip circles/fig 8  nv    Small shuffle    Hamstring curls  nv    Jog    Knee extension      Braiding    Pelvic tilts  nv    Bicycling  nv  Scap squeezes          Shoulder flex/ext      Functional    Shoulder abd/add      Step  4th visit  Shoulder H. abd/add      Lifting    Shoulder IR/ER      Hand to opp knee    Rowing      Push down squat    Bilateral pull down      UE PNF    Push/pull      LE PNF    Push downs      Wall push ups    Arm circles      SLS    Elbow flex/ext          Chin tuck      Stretching    UT shrugs/rolls      Gastroc/Soleus  nv  Rocking horse      Hamstring  nv        SKTC    Other      Piriformis          Hip flexor          Ladder pull          Pec stretch          Post deltoid           Time In:      Timed Code Treatment Minutes:       Total Treatment Minutes:      Treatment/Activity Tolerance:   [] Patient tolerated treatment well [] Patient limited by fatigue   [] Patient limited by pain [] Patient limited by other medical complications  [] Other:     Prognosis: [] Good [] Fair  [] Poor    Patient Requires Follow-up:  [] Yes  [] No    Plan: [] Continue per plan of care [] Alter current plan (see comments)   [] Plan of care initiated [] Hold pending MD visit [] Discharge    See Weekly Progress Note: [] Yes  [] No  Next due:

## 2020-10-15 ENCOUNTER — HOSPITAL ENCOUNTER (OUTPATIENT)
Dept: PHYSICAL THERAPY | Age: 50
Setting detail: THERAPIES SERIES
Discharge: HOME | End: 2020-10-15
Payer: COMMERCIAL

## 2020-10-15 PROCEDURE — 97113 AQUATIC THERAPY/EXERCISES: CPT

## 2020-10-15 PROCEDURE — 97150 GROUP THERAPEUTIC PROCEDURES: CPT

## 2020-10-15 NOTE — FLOWSHEET NOTE
Physical Therapy Aquatic Flow Sheet  Date:  10/15/2020    Patient Name:  Jinny Sandoval    Restrictions:  none  Medical/Treatment Diagnosis Information:  · Diagnosis: Lumbar DDD  Insurance/Certification information:  PT Insurance Information: [employer] - no stim, no ionto, 30 visits per year  Physician Information:  Referring Practitioner: Jimmy Napoles CNP  Plan of care signed (Y/N):  N  Visit# / total visits:  2/10            Pain level: 8/10   Electronically signed by:  Mook Enrique PT    Medicare Cap (if applicable):  n/a = total amount used, updated 10/15/2020    Key  B= Belt DB= Dumbells T= Theratube   H= Hydrotone N= Noodles W= Weights   P= Paddles S= Speedo equipment K= Kickboard     Exercises/Activities   Warm-up/Amb    Exercises      Slow forward  2 laps  HR/TR  15x    Slow sideways  2 laps  Marches  2 min    Slow backwards  2 laps  Mini-squats  15x    Medium forward    4-way SLR  10x B    Medium sideways    Hip circles/fig 8  10x B    Small shuffle    Hamstring curls  15x B    Jog    Knee extension  15x B    Braiding    Pelvic tilts  10x5\"     Bicycling  2 min  Scap squeezes          Shoulder flex/ext      Functional    Shoulder abd/add      Step  10x B  Shoulder H. abd/add      Lifting    Shoulder IR/ER      Hand to opp knee    Rowing      Push down squat    Bilateral pull down      UE PNF    Push/pull      LE PNF    Push downs      Wall push ups    Arm circles      SLS  x60\" B  Elbow flex/ext          Chin tuck      Stretching    UT shrugs/rolls      Gastroc/Soleus  2x30\" B  Rocking horse      Hamstring  2x30\" B        SKTC    Other      Piriformis          Hip flexor          Ladder pull          Pec stretch          Post deltoid           Time In:  2:00    Timed Code Treatment Minutes:  15    Total Treatment Minutes:  30 (1 aq individual, 1 aq group)    Treatment/Activity Tolerance:   [x] Patient tolerated treatment well [] Patient limited by fatigue   [] Patient limited by pain [] Patient limited by other medical complications  [] Other:     Prognosis: [] Good [x] Fair  [] Poor    Patient Requires Follow-up:  [x] Yes  [] No    Plan: [x] Continue per plan of care [] Alter current plan (see comments)   [] Plan of care initiated [] Hold pending MD visit [] Discharge    See Weekly Progress Note: [] Yes  [x] No  Next due:

## 2020-10-19 ENCOUNTER — OFFICE VISIT (OUTPATIENT)
Dept: ORTHOPEDIC SURGERY | Age: 50
End: 2020-10-19
Payer: COMMERCIAL

## 2020-10-19 VITALS — BODY MASS INDEX: 40.92 KG/M2 | WEIGHT: 270 LBS | HEIGHT: 68 IN

## 2020-10-19 PROBLEM — M17.11 ARTHRITIS OF RIGHT KNEE: Status: ACTIVE | Noted: 2020-10-19

## 2020-10-19 PROCEDURE — 99213 OFFICE O/P EST LOW 20 MIN: CPT | Performed by: PHYSICIAN ASSISTANT

## 2020-10-19 PROCEDURE — G8417 CALC BMI ABV UP PARAM F/U: HCPCS | Performed by: PHYSICIAN ASSISTANT

## 2020-10-19 PROCEDURE — 3017F COLORECTAL CA SCREEN DOC REV: CPT | Performed by: PHYSICIAN ASSISTANT

## 2020-10-19 PROCEDURE — 1036F TOBACCO NON-USER: CPT | Performed by: PHYSICIAN ASSISTANT

## 2020-10-19 PROCEDURE — G8484 FLU IMMUNIZE NO ADMIN: HCPCS | Performed by: PHYSICIAN ASSISTANT

## 2020-10-19 PROCEDURE — G8427 DOCREV CUR MEDS BY ELIG CLIN: HCPCS | Performed by: PHYSICIAN ASSISTANT

## 2020-10-19 NOTE — PROGRESS NOTES
Subjective:      Patient ID: Thea Estrada is a 45 y.o. female. Chief Complaint   Patient presents with    Pain     Right knee - no known injury. Been hurting on / off for a few months. HPI:   She is here in the [address]   for an initial evaluation of a new problem. Right anterior and lateral knee pain. Pain Scale 4/10 VAS. Location of pain anterior and lateral aspect of the right knee. Pain is worse with weightbearing activities. Pain improves with rest, elevation. Previous treatments have included: Tylenol with minimal improvement. .     History of prior left knee arthroplasty. Review Of Systems:   A 14 point review of systems and history form completed by the patient has been reviewed. This scanned in the media tab of the patient's chart under today's date. As outlined in the HPI. Negative for fever or chills. Positive for joint pain, swelling and stiffness. Negative for numbness or tingling.      Past Medical History:   Diagnosis Date    ADHD (attention deficit hyperactivity disorder)     Adopted     Blood transfusion     GI BLEEDING-COUMADIN    CHF (congestive heart failure) ([address]     COVID-19 07/16/2020    Degenerative disc disease     Dental disease     Dizziness     DVT (deep venous thrombosis) (HCC)     Fibromyalgia     Headache     Hearing loss     History of blood transfusion     Hx of blood clots 2004    PE and DVT    Hypertension     controlled lately    Hypertrophic cardiomyopathy ([address]     mild, echo 2016    Hypoglycemia 2012    PAST HISTORY    Lupus anticoagulant disorder ([address]     Narcolepsy     Nosebleed     PE (pulmonary embolism)     Pneumonia     Pulmonary edema 03/2016    Rheumatoid arthritis ([address]     Sleep apnea     CPAP    TMJ dysfunction        Family History   Adopted: Yes       Past Surgical History:   Procedure Laterality Date    CHOLECYSTECTOMY, LAPAROSCOPIC      COLONOSCOPY N/A 9/16/2020    COLONOSCOPY WITH BIOPSY performed by Jeri Rogers MD at [address] [place] [address] St      bone spur  - rt foot    HYSTEROSCOPY  05/25/2017    D & C    KNEE ARTHROPLASTY Left 08/14/2018    KNEE ARTHROSCOPY Left 2006    MENISCUS    KNEE ARTHROSCOPY Left 09/09/2016    MENISCUS    [place] TOTAL KNEE ARTHROPLASTY Left 8/14/2018    LEFT TOTAL KNEE REPLACEMENT WITH PLATELET RICH PLASMA REYES & NEPHEW LEGION OX XLPE performed by Jerry Campos MD at [place] [id number]  11/29/2017    LAPAROSCOPIC SLEEVE GASTRECTOMY-ETHICON, LIVER BIOPSY    UMBILICAL HERNIA REPAIR  11/05/2012    LAPAROSCOPIC WITH PHYSIO MESH       Social History     Occupational History    Not on file   Tobacco Use    Smoking status: Never Smoker    Smokeless tobacco: Never Used   Substance and Sexual Activity    Alcohol use: Yes     Comment: rarely    Drug use: No     Comment: on chronic percocet for back pain     Sexual activity: Not Currently       Current Outpatient Medications   Medication Sig Dispense Refill    oxyCODONE-acetaminophen (PERCOCET) 7.5-325 MG per tablet Take 1 tablet by mouth every 6 hours as needed for Pain (back pain).  albuterol sulfate HFA (PROAIR HFA) 108 (90 Base) MCG/ACT inhaler Use 1-2 puffs every 4 hours while awake as needed for cough/wheezing  Dispense with SPACER and Instruct on use. May sub Ventolin or Proventil as needed per [employer]. 1 Inhaler 0    bacitracin 500 UNIT/GM ointment Apply topically      furosemide (LASIX) 40 MG tablet Take 1 tablet by mouth as needed (lower extremity swelling.) 30 tablet 2    citalopram (CELEXA) 40 MG tablet TAKE 1 TABLET BY MOUTH ONCE DAILY 30 tablet 10    ARIPiprazole (ABILIFY) 10 MG tablet Take 5 mg by mouth daily       buPROPion (WELLBUTRIN XL) 300 MG extended release tablet TAKE (1) TABLET BY MOUTH ONCE DAILY 30 tablet 11    XARELTO 20 MG TABS tablet TAKE (1) TABLET BY MOUTH EVERY 24 HOURS.  -PATIENT NEEDS APPOINTMENT FOR FURTHER REFILLS- 30 tablet 10    valACYclovir (VALTREX) 1 g tablet TAKE 2 TABLETS BY MOUTH EVERY 12 HOURS FOR 2 DAYS 8 tablet 1    ondansetron (ZOFRAN ODT) 4 MG disintegrating tablet Take 2 tablets by mouth every 8 hours as needed for Nausea 30 tablet 1    dextroamphetamine (DEXTROSTAT) 10 MG tablet Take 10 mg by mouth 2 times daily .  amphetamine-dextroamphetamine (ADDERALL XR) 30 MG extended release capsule Take 30 mg by mouth 2 times daily . No current facility-administered medications for this visit. Objective:     She is alert, oriented x 3, pleasant, well nourished, developed and in no   acute distress. Ht 5' 8\" (1.727 m)   Wt 270 lb (122.5 kg)   BMI 41.05 kg/m²      Examination of the right knee: The alignment of the knee is neutral.   There is not erythema. There no soft tissue swelling. There is mild effusion. ROM-  Extension 0          -   Flexion  125   There mild pain associated with ROM testing. Medial joint line is somewhat tender to palpation. Lateral joint line is tender to palpation. Retro patellar crepitus is present. There is no crepitus along the joint line with ROM testing. Varus Stress testing does produce pain,                                     does not show laxity. Valgus Stress testing does not produce pain,                                       does not show laxity. Lachman's test- negative. Anterior Drawer test- negative. Posterior Drawer test- negative. Patrick's Test- negative. Patellar Compression testing does produce pain. Extensor Mechanism is  intact. Examination of the lower extremities are intact with sensation to light touch. Motor testing  5/5 in all major motor groups of the lower extremities. Gait is normal heel to toe. Gait is antalgic. Negative Lion's Sign. SLR negative. Examination of the lower extremities shows intact perfusion to all extremities. No cyanosis.   Digits are warm to touch, capillary refill is less than 2 seconds. There is no edema noted. Examination of the skin over both lower extremities reveals: The skin to be intact without lacerations or abrasions. No significant erythema. No rashes or skin lesions. X Rays: performed in the office today:   AP, PA Standing, Lateral and Sunrise views of right knee: There is moderate osteoarthritic findings of the right knee noted within the patellofemoral compartment. No acute fractures or dislocations noted. Additional Tests reviewed: none  Additional Outside Records reviewed: none    Diagnosis:       ICD-10-CM    1. Right knee pain, unspecified chronicity  M25.561 XR KNEE RIGHT (MIN 4 VIEWS)   2. Arthritis of right knee PF compartment  M17.11         Assessment and Plan:       Right anterior and lateral knee pain due to patellofemoral arthritis. The natural history of the patient's diagnosis as well as the treatment options were discussed in full and questions were answered. Risks and benefits of the treatment options also reviewed in detail. Weight loss, activity modification, home exercise therapy program, dietary changes have been discussed as a means to help control the symptoms. Non surgical options including cortisone injection, Visco supplementation injection,  Coolief (cooled frequency ablation of the geniculate nerves), stem cell injections, PRP injections were discussed. Surgical option, arthroplasty discussed. Follow Up: 1 week with Dr Avila Scale  Call or return to clinic prn if these symptoms worsen or fail to improve as anticipated.

## 2020-10-20 ENCOUNTER — HOSPITAL ENCOUNTER (OUTPATIENT)
Dept: PHYSICAL THERAPY | Age: 50
Setting detail: THERAPIES SERIES
Discharge: HOME | End: 2020-10-20
Payer: COMMERCIAL

## 2020-10-20 PROCEDURE — 97150 GROUP THERAPEUTIC PROCEDURES: CPT

## 2020-10-20 PROCEDURE — 97113 AQUATIC THERAPY/EXERCISES: CPT

## 2020-10-20 NOTE — FLOWSHEET NOTE
Physical Therapy Aquatic Flow Sheet  Date:  10/20/2020    Patient Name:  Rick Tracy    Restrictions:  none  Medical/Treatment Diagnosis Information:  · Diagnosis: Lumbar DDD  Insurance/Certification information:  PT Insurance Information: [employer] - no stim, no ionto, 30 visits per year  Physician Information:  Referring Practitioner: Mariana Jeffries CNP  Plan of care signed (Y/N):  N  Visit# / total visits:  3/10            Pain level: 8/10   Electronically signed by:  Larry Evans PT    Medicare Cap (if applicable):  n/a = total amount used, updated 10/20/2020    Key  B= Belt DB= Dumbells T= Theratube   H= Hydrotone N= Noodles W= Weights   P= Paddles S= Speedo equipment K= Kickboard     Exercises/Activities   Warm-up/Amb    Exercises      Slow forward  2 laps  HR/TR  15x    Slow sideways  2 laps  Marches  2 min    Slow backwards  2 laps  Mini-squats  15x    Medium forward    4-way SLR  12x B    Medium sideways    Hip circles/fig 8  12x B    Small shuffle    Hamstring curls  15x B    Jog    Knee extension  15x B    Braiding    Pelvic tilts  10x5\"     Bicycling  2 min  Scap squeezes          Shoulder flex/ext      Functional    Shoulder abd/add      Step  10x B  Shoulder H. abd/add      Lifting    Shoulder IR/ER      Hand to opp knee    Rowing      Push down squat    Bilateral pull down      UE PNF    Push/pull      LE PNF    Push downs      Wall push ups    Arm circles      SLS  x60\" B  Elbow flex/ext          Chin tuck      Stretching    UT shrugs/rolls      Gastroc/Soleus  2x30\" B  Rocking horse      Hamstring  2x30\" B        SKTC    Other      Piriformis          Hip flexor          Ladder pull          Pec stretch          Post deltoid           Time In:  2:00    Timed Code Treatment Minutes:  15    Total Treatment Minutes:  40 (1 aq individual, 1 aq group)    Treatment/Activity Tolerance:   [x] Patient tolerated treatment well [] Patient limited by fatigue   [] Patient limited by pain [] Patient

## 2020-10-22 ENCOUNTER — HOSPITAL ENCOUNTER (OUTPATIENT)
Dept: PHYSICAL THERAPY | Age: 50
Setting detail: THERAPIES SERIES
Discharge: HOME | End: 2020-10-22
Payer: COMMERCIAL

## 2020-10-22 PROCEDURE — 97113 AQUATIC THERAPY/EXERCISES: CPT

## 2020-10-22 PROCEDURE — 97150 GROUP THERAPEUTIC PROCEDURES: CPT

## 2020-10-22 NOTE — FLOWSHEET NOTE
Physical Therapy Aquatic Flow Sheet  Date:  10/22/2020    Patient Name:  Tj Jasso    Restrictions:  none  Medical/Treatment Diagnosis Information:  · Diagnosis: Lumbar DDD  Insurance/Certification information:  PT Insurance Information: [employer] - no stim, no ionto, 30 visits per year  Physician Information:  Referring Practitioner: Mary Anne Calderon CNP  Plan of care signed (Y/N):  N  Visit# / total visits:  4/10            Pain level: 8/10 \"it's an 8\"   Electronically signed by:  Jennifer Mack PT    Medicare Cap (if applicable):  n/a = total amount used, updated 10/22/2020    Key  B= Belt DB= Dumbells T= Theratube   H= Hydrotone N= Noodles W= Weights   P= Paddles S= Speedo equipment K= Kickboard     Exercises/Activities   Warm-up/Amb    Exercises      Slow forward  2 laps  HR/TR  15x    Slow sideways  2 laps  Marches  2 min    Slow backwards  2 laps  Mini-squats  15x    Medium forward    4-way SLR  12x B    Medium sideways    Hip circles/fig 8  12x B    Small shuffle    Hamstring curls  15x B    Jog    Knee extension  15x B    Braiding    Pelvic tilts  10x5\"     Bicycling  2 min  Scap squeezes          Shoulder flex/ext  x15B w/ TA    Functional    Shoulder abd/add  x15B w/ TA    Step  10x B  Shoulder H. abd/add  x15B w/ TA    Lifting    Shoulder IR/ER      Hand to opp knee    Rowing      Push down squat    Bilateral pull down      UE PNF    Push/pull      LE PNF    Push downs      Wall push ups    Arm circles      SLS  x60\" B  Elbow flex/ext          Chin tuck      Stretching    UT shrugs/rolls      Gastroc/Soleus  2x30\" B  Rocking horse      Hamstring  2x30\" B        SKTC    Other      Piriformis          Hip flexor          Ladder pull          Pec stretch          Post deltoid           Time In:  2:00pm    Timed Code Treatment Minutes:  15min.     Total Treatment Minutes:  35min. (1 aq individual, 1 aq group)    Treatment/Activity Tolerance:   [x] Patient tolerated treatment well [] Patient

## 2020-10-26 ENCOUNTER — OFFICE VISIT (OUTPATIENT)
Dept: ORTHOPEDIC SURGERY | Age: 50
End: 2020-10-26
Payer: COMMERCIAL

## 2020-10-26 VITALS — WEIGHT: 270 LBS | BODY MASS INDEX: 40.92 KG/M2 | HEIGHT: 68 IN

## 2020-10-26 PROCEDURE — 99213 OFFICE O/P EST LOW 20 MIN: CPT | Performed by: PHYSICIAN ASSISTANT

## 2020-10-26 PROCEDURE — G8484 FLU IMMUNIZE NO ADMIN: HCPCS | Performed by: PHYSICIAN ASSISTANT

## 2020-10-26 PROCEDURE — 1036F TOBACCO NON-USER: CPT | Performed by: PHYSICIAN ASSISTANT

## 2020-10-26 PROCEDURE — 3017F COLORECTAL CA SCREEN DOC REV: CPT | Performed by: PHYSICIAN ASSISTANT

## 2020-10-26 PROCEDURE — G8417 CALC BMI ABV UP PARAM F/U: HCPCS | Performed by: PHYSICIAN ASSISTANT

## 2020-10-26 PROCEDURE — G8427 DOCREV CUR MEDS BY ELIG CLIN: HCPCS | Performed by: PHYSICIAN ASSISTANT

## 2020-10-26 RX ORDER — BUPIVACAINE HYDROCHLORIDE 2.5 MG/ML
30 INJECTION, SOLUTION INFILTRATION; PERINEURAL ONCE
Status: COMPLETED | OUTPATIENT
Start: 2020-10-26 | End: 2020-10-26

## 2020-10-26 RX ORDER — METHYLPREDNISOLONE ACETATE 40 MG/ML
80 INJECTION, SUSPENSION INTRA-ARTICULAR; INTRALESIONAL; INTRAMUSCULAR; SOFT TISSUE ONCE
Status: COMPLETED | OUTPATIENT
Start: 2020-10-26 | End: 2020-10-26

## 2020-10-26 RX ORDER — LIDOCAINE HYDROCHLORIDE 10 MG/ML
20 INJECTION, SOLUTION INFILTRATION; PERINEURAL ONCE
Status: COMPLETED | OUTPATIENT
Start: 2020-10-26 | End: 2020-10-26

## 2020-10-26 RX ADMIN — METHYLPREDNISOLONE ACETATE 80 MG: 40 INJECTION, SUSPENSION INTRA-ARTICULAR; INTRALESIONAL; INTRAMUSCULAR; SOFT TISSUE at 12:44

## 2020-10-26 RX ADMIN — LIDOCAINE HYDROCHLORIDE 20 ML: 10 INJECTION, SOLUTION INFILTRATION; PERINEURAL at 12:44

## 2020-10-26 RX ADMIN — BUPIVACAINE HYDROCHLORIDE 75 MG: 2.5 INJECTION, SOLUTION INFILTRATION; PERINEURAL at 12:44

## 2020-10-26 NOTE — PROGRESS NOTES
Intermittent  Aggravating Factors: Stairs, Walking, Stretching, Bending  Limiting Behavior: Some  Relieving Factors: Rest]      Work Status/Functionality:     Past Medical History: Medical history form was reviewed today & can be found in the media tab  Past Medical History:   Diagnosis Date    ADHD (attention deficit hyperactivity disorder)     Adopted     Blood transfusion     GI BLEEDING-COUMADIN    CHF (congestive heart failure) ([address]     COVID-19 07/16/2020    Degenerative disc disease     Dental disease     Dizziness     DVT (deep venous thrombosis) (HCC)     Fibromyalgia     Headache     Hearing loss     History of blood transfusion     Hx of blood clots 2004    PE and DVT    Hypertension     controlled lately    Hypertrophic cardiomyopathy ([address]     mild, echo 2016    Hypoglycemia 2012    PAST HISTORY    Lupus anticoagulant disorder ([address]     Narcolepsy     Nosebleed     PE (pulmonary embolism)     Pneumonia     Pulmonary edema 03/2016    [facility]     Sleep apnea     CPAP    TMJ dysfunction       Past Surgical History:     Past Surgical History:   Procedure Laterality Date    CHOLECYSTECTOMY, LAPAROSCOPIC      COLONOSCOPY N/A 9/16/2020    COLONOSCOPY WITH BIOPSY performed by June Cho MD at [address] [place] [address]      bone spur  - rt foot    HYSTEROSCOPY  05/25/2017    D & C    KNEE ARTHROPLASTY Left 08/14/2018    KNEE ARTHROSCOPY Left 2006    MENISCUS    KNEE ARTHROSCOPY Left 09/09/2016    MENISCUS    [place] TOTAL KNEE ARTHROPLASTY Left 8/14/2018    LEFT TOTAL KNEE REPLACEMENT WITH PLATELET RICH PLASMA REYES & NEPHEW LEGION OX XLPE performed by Ramón Wells MD at [place] [id number]  11/29/2017    LAPAROSCOPIC SLEEVE GASTRECTOMY-ETHICON, LIVER BIOPSY    UMBILICAL HERNIA REPAIR  11/05/2012    LAPAROSCOPIC WITH PHYSIO MESH     Current Medications:     Current Outpatient Medications:     oxyCODONE-acetaminophen (PERCOCET) 7.5-325 Treatment Plan:     1. Right knee osteoarthritis    I discussed with the patient the nature of osteoarthritis of the knee. We talked about treatment of arthritis and the various options that are involved with this. The patient understands that the treatments can vary from essentially doing nothing to a total joint replacement arthroplasty for arthritis. I then went on to describe the utilization of glucosamine and chondroitin sulfate as a joint nutrition product. We talked about the fact that this is essentially a joint vitamin with typically minimal side effects. We also talked about utilization of prescription over-the-counter anti-inflammatory medications as the next option. We also discussed the possibility of brace wear or orthotic wear if the patient has significant varus alignment. We discussed the importance of a reasonable body mass as well. They understand that obesity can impact joint pain and accelerate the development of osteoarthritis. We then went on to discuss the possibility of Visco supplementation with hyaluronate products. We talked about the typical course of this type of treatment and the fact that often times in the treatment for significant arthritis, this is successful less than half the time. We also talked about the corticosteroid injections and the fact that this can give a brief window of relief, but does not cure the problem; in fact, the pain often has a rebound effect in 6-10 weeks after the steroid has worn off. We also discussed arthroscopy surgery in attempts to debride the joint, but the fact that this is relatively unreliable treatment in the face of significant arthritis. It can occasionally be used, particularly if there is significant meniscus pathology. Lastly we discussed total joint replacement arthroplasty as the final and definitive step in treatment of arthritis.  Patient realizes the magnitude of this type of treatment as well as having voiced a general understanding to the duration of the prosthesis. The patient voiced understanding to these continuum of treatment options. She is elected to receive a cortisone injection to the right knee today. She is also given home physical therapy exercises she can utilize. She is currently participating in aquatic therapy for her back which is helping a lot with the knee as well. I spent 15 minutes face-to-face with the patient and greater than 50% of that time was spent counseling/coordinating care for the above-stated diagnosis     I discussed in detail the risks, benefits and complications of an injection which included but are not limited to infection, skin reactions, hot swollen joint, and anaphylaxis with the patient. The patient verbalized understanding and gave informed consent for the injection. The patient's knee was flexed to 90° and the skin prepped using sterile alcohol solution. A sterile 22-gauge needle was inserted into the knee and the mixture of 4 mL of 2% Carbocaine, 4 mL of 0.25% Marcaine, and 80 mg of Depo-Medrol was injected under sterile technique. The needle was withdrawn and the puncture site sealed with a Band-Aid. Technique: Under sterile conditions a [employer] ultrasound unit with a variable frequency (6.0-15.0 MHz) linear transducer was used to localize the placement of a 22-gauge needle into the right knee joint. Findings: Successful needle placement for knee injection. Final images were taken and saved for permanent record. The patient tolerated the injection well. The patient was instructed to call the office immediately if there is any pain, redness, warmth, fever, or chills. Quinten Archibald, [facility]    This dictation was performed with a verbal recognition program [facility]) and it was checked for errors. It is possible that there are still dictated errors within this office note. If so, please bring any errors to my attention for an addendum.  All efforts were made to ensure that this office note is accurate.

## 2020-10-27 ENCOUNTER — APPOINTMENT (OUTPATIENT)
Dept: PHYSICAL THERAPY | Age: 50
End: 2020-10-27
Payer: COMMERCIAL

## 2020-10-29 ENCOUNTER — HOSPITAL ENCOUNTER (OUTPATIENT)
Dept: PHYSICAL THERAPY | Age: 50
Setting detail: THERAPIES SERIES
Discharge: HOME | End: 2020-10-29
Payer: COMMERCIAL

## 2020-10-29 PROCEDURE — 97150 GROUP THERAPEUTIC PROCEDURES: CPT

## 2020-10-29 PROCEDURE — 97113 AQUATIC THERAPY/EXERCISES: CPT

## 2020-10-29 NOTE — FLOWSHEET NOTE
Physical Therapy Aquatic Flow Sheet  Date:  10/29/2020    Patient Name:  Mario Bradley    Restrictions:  none  Medical/Treatment Diagnosis Information:  · Diagnosis: Lumbar DDD  Insurance/Certification information:  PT Insurance Information: [employer] - no stim, no ionto, 30 visits per year  Physician Information:  Referring Practitioner: Charline Damian CNP  Plan of care signed (Y/N):  N  Visit# / total visits:  5/10            Pain level: 8/10 \"it's an 8\"   Electronically signed by:  Chung Reza, PT    Medicare Cap (if applicable):  n/a = total amount used, updated 10/29/2020    Key  B= Belt DB= Dumbells T= Theratube   H= Hydrotone N= Noodles W= Weights   P= Paddles S= Speedo equipment K= Kickboard     Exercises/Activities   Warm-up/Amb    Exercises      Slow forward  2 laps  HR/TR  20x    Slow sideways  2 laps  Marches  2 min    Slow backwards  2 laps  Mini-squats  15x    Medium forward    4-way SLR  15x B    Medium sideways    Hip circles/fig 8  15x B    Small shuffle    Hamstring curls  15x B    Jog    Knee extension  15x B    Braiding    Pelvic tilts  10x5\"     Bicycling  2 min  Scap squeezes          Shoulder flex/ext  x15B w/ TA    Functional    Shoulder abd/add  x15B w/ TA    Step  10x B  Shoulder H. abd/add  x15B w/ TA    Lifting    Shoulder IR/ER      Hand to opp knee    Rowing      Push down squat    Bilateral pull down      UE PNF    Push/pull      LE PNF    Push downs      Wall push ups    Arm circles      SLS  x60\" B  Elbow flex/ext          Chin tuck      Stretching    UT shrugs/rolls      Gastroc/Soleus  2x30\" B  Rocking horse      Hamstring  2x30\" B        SKTC    Other      Piriformis          Hip flexor          Ladder pull          Pec stretch          Post deltoid           Time In:  2:05pm    Timed Code Treatment Minutes:  15min.     Total Treatment Minutes:  47min. (1 aq individual, 1 aq group)    Treatment/Activity Tolerance:   [x] Patient tolerated treatment well [] Patient limited by fatigue   [] Patient limited by pain [] Patient limited by other medical complications  [] Other:     Prognosis: [] Good [x] Fair  [] Poor    Patient Requires Follow-up:  [x] Yes  [] No    Plan: [x] Continue per plan of care [] Alter current plan (see comments)   [] Plan of care initiated [] Hold pending MD visit [] Discharge    See Weekly Progress Note: [] Yes  [x] No  Next due:

## 2020-11-03 ENCOUNTER — HOSPITAL ENCOUNTER (OUTPATIENT)
Dept: PHYSICAL THERAPY | Age: 50
Setting detail: THERAPIES SERIES
Discharge: HOME | End: 2020-11-03
Payer: COMMERCIAL

## 2020-11-03 NOTE — FLOWSHEET NOTE
Physical Therapy  Cancellation/No-show Note  Patient Name:  Tita Odell  :  1970   Date:  11/3/2020  Cancels to date: 1  No-shows to date: 0    For today's appointment patient:  [x] Cancelled  [] Rescheduled appointment  [] No-show     Reason given by patient:  [] Patient ill  [] Conflicting appointment  [] No transportation    [] Conflict with work  [] No reason given  [x] Other:     Comments:  Took father to vote and couldn't make it back in time for therapy.     Electronically signed by:  Brittni Rojas, PT

## 2020-11-05 ENCOUNTER — HOSPITAL ENCOUNTER (OUTPATIENT)
Dept: PHYSICAL THERAPY | Age: 50
Setting detail: THERAPIES SERIES
Discharge: HOME | End: 2020-11-05
Payer: COMMERCIAL

## 2020-11-05 PROCEDURE — 97150 GROUP THERAPEUTIC PROCEDURES: CPT

## 2020-11-05 PROCEDURE — 97113 AQUATIC THERAPY/EXERCISES: CPT

## 2020-11-05 PROCEDURE — 97110 THERAPEUTIC EXERCISES: CPT

## 2020-11-05 NOTE — FLOWSHEET NOTE
limited by fatigue   [] Patient limited by pain [] Patient limited by other medical complications  [] Other:     Prognosis: [] Good [x] Fair  [] Poor    Patient Requires Follow-up:  [x] Yes  [] No    Plan: [x] Continue per plan of care [] Alter current plan (see comments)   [] Plan of care initiated [] Hold pending MD visit [] Discharge    See Weekly Progress Note: [] Yes  [x] No  Next due:

## 2020-11-10 ENCOUNTER — HOSPITAL ENCOUNTER (OUTPATIENT)
Dept: PHYSICAL THERAPY | Age: 50
Setting detail: THERAPIES SERIES
Discharge: HOME | End: 2020-11-10
Payer: COMMERCIAL

## 2020-11-10 PROCEDURE — 97150 GROUP THERAPEUTIC PROCEDURES: CPT

## 2020-11-10 PROCEDURE — 97113 AQUATIC THERAPY/EXERCISES: CPT

## 2020-11-10 NOTE — FLOWSHEET NOTE
Physical Therapy Aquatic Flow Sheet  Date:  11/10/2020    Patient Name:  Dani George    Restrictions:  none  Medical/Treatment Diagnosis Information:  · Diagnosis: Lumbar DDD  Insurance/Certification information:  PT Insurance Information: [employer] - no stim, no ionto, 30 visits per year  Physician Information:  Referring Practitioner: Flakito Chandler CNP  Plan of care signed (Y/N):  N  Visit# / total visits:  7/10            Pain level: 7/10   Electronically signed by:  Jonas Lanier PT    Medicare Cap (if applicable):  n/a = total amount used, updated 11/10/2020    Key  B= Belt DB= Dumbells T= Theratube   H= Hydrotone N= Noodles W= Weights   P= Paddles S= Speedo equipment K= Kickboard     Exercises/Activities   Warm-up/Amb    Exercises      Slow forward  2 laps  HR/TR  20x    Slow sideways  2 laps  Marches  2 min    Slow backwards  2 laps  Mini-squats  20x    Medium forward    4-way SLR  20x B    Medium sideways    Hip circles/fig 8  20x B    Small shuffle    Hamstring curls  20x B    Jog    Knee extension  20x B    Braiding    Pelvic tilts  12x5\"     Bicycling  2 min  Scap squeezes          Shoulder flex/ext  x20B w/ TA    Functional    Shoulder abd/add  x20B w/ TA    Step  10x B  Shoulder H. abd/add  x20B w/ TA    Lifting    Shoulder IR/ER      Hand to opp knee    Rowing      Push down squat    Bilateral pull down      UE PNF    Push/pull      LE PNF    Push downs      Wall push ups    Arm circles      SLS  x60\" B  Elbow flex/ext          Chin tuck      Stretching    UT shrugs/rolls      Gastroc/Soleus  2x30\" B  Rocking horse      Hamstring  2x30\" B        SKTC    Other      Piriformis          Hip flexor          Ladder pull          Pec stretch          Post deltoid           Time In:  2:15pm    Timed Code Treatment Minutes:  15min.     Total Treatment Minutes:  45min. (1 aq individual, 1 aq group)    Treatment/Activity Tolerance:   [x] Patient tolerated treatment well [] Patient limited by fatigue   [] Patient limited by pain [] Patient limited by other medical complications  [] Other:     Prognosis: [] Good [x] Fair  [] Poor    Patient Requires Follow-up:  [x] Yes  [] No    Plan: [x] Continue per plan of care [] Alter current plan (see comments)   [] Plan of care initiated [] Hold pending MD visit [] Discharge    See Weekly Progress Note: [] Yes  [x] No  Next due:

## 2020-11-12 ENCOUNTER — HOSPITAL ENCOUNTER (OUTPATIENT)
Dept: PHYSICAL THERAPY | Age: 50
Setting detail: THERAPIES SERIES
Discharge: HOME | End: 2020-11-12
Payer: COMMERCIAL

## 2020-11-12 PROCEDURE — 97113 AQUATIC THERAPY/EXERCISES: CPT

## 2020-11-12 PROCEDURE — 97150 GROUP THERAPEUTIC PROCEDURES: CPT

## 2020-11-12 NOTE — FLOWSHEET NOTE
Physical Therapy Aquatic Flow Sheet  Date:  11/12/2020    Patient Name:  Sylvia Sagastume    Restrictions:  none  Medical/Treatment Diagnosis Information:  · Diagnosis: Lumbar DDD  Insurance/Certification information:  PT Insurance Information: [employer] - no stim, no ionto, 30 visits per year  Physician Information:  Referring Practitioner: Jorge Blanchard CNP  Plan of care signed (Y/N):  N  Visit# / total visits:  8/10            Pain level: 8/10   Electronically signed by:  Sandi Carrasco PT    Medicare Cap (if applicable):  n/a = total amount used, updated 11/12/2020    Key  B= Belt DB= Dumbells T= Theratube   H= Hydrotone N= Noodles W= Weights   P= Paddles S= Speedo equipment K= Kickboard     Exercises/Activities   Warm-up/Amb    Exercises      Slow forward  2 laps  HR/TR  20x    Slow sideways  2 laps  Marches  2 min    Slow backwards  2 laps  Mini-squats  20x    Medium forward    4-way SLR  20x B    Medium sideways    Hip circles/fig 8  20x B    Small shuffle    Hamstring curls  20x B    Jog    Knee extension  20x B    Braiding    Pelvic tilts  12x5\"     Bicycling  2 min  Scap squeezes          Shoulder flex/ext  x20B w/ TA    Functional    Shoulder abd/add  x20B w/ TA    Step  10x B  Shoulder H. abd/add  x20B w/ TA    Lifting    Shoulder IR/ER      Hand to opp knee    Rowing      Push down squat    Bilateral pull down      UE PNF    Push/pull      LE PNF    Push downs      Wall push ups    Arm circles      SLS  x60\" B  Elbow flex/ext          Chin tuck      Stretching    UT shrugs/rolls      Gastroc/Soleus  2x30\" B  Rocking horse      Hamstring  2x30\" B        SKTC    Other      Piriformis    Tandem 60\" B    Hip flexor          Ladder pull          Pec stretch          Post deltoid           Time In:  2:00pm    Timed Code Treatment Minutes:  15min.     Total Treatment Minutes:  45min. (1 aq individual, 1 aq group)    Treatment/Activity Tolerance:   [x] Patient tolerated treatment well [] Patient limited by fatigue   [] Patient limited by pain [] Patient limited by other medical complications  [] Other:     Prognosis: [] Good [x] Fair  [] Poor    Patient Requires Follow-up:  [x] Yes  [] No    Plan: [x] Continue per plan of care [] Alter current plan (see comments)   [] Plan of care initiated [] Hold pending MD visit [] Discharge    See Weekly Progress Note: [] Yes  [x] No  Next due:

## 2020-11-17 ENCOUNTER — HOSPITAL ENCOUNTER (OUTPATIENT)
Dept: PHYSICAL THERAPY | Age: 50
Setting detail: THERAPIES SERIES
Discharge: HOME | End: 2020-11-17
Payer: COMMERCIAL

## 2020-11-17 PROCEDURE — 97150 GROUP THERAPEUTIC PROCEDURES: CPT

## 2020-11-17 PROCEDURE — 97113 AQUATIC THERAPY/EXERCISES: CPT

## 2020-11-17 NOTE — FLOWSHEET NOTE
Physical Therapy Aquatic Flow Sheet  Date:  11/17/2020    Patient Name:  Viva Peabody    Restrictions:  none  Medical/Treatment Diagnosis Information:  · Diagnosis: Lumbar DDD  Insurance/Certification information:  PT Insurance Information: [employer] - no stim, no ionto, 30 visits per year  Physician Information:  Referring Practitioner: Manohar Joshua CNP  Plan of care signed (Y/N):  N  Visit# / total visits:  9/10            Pain level: 8/10   Electronically signed by:  Hayde Breen PT    Medicare Cap (if applicable):  n/a = total amount used, updated 11/17/2020    Key  B= Belt DB= Dumbells T= Theratube   H= Hydrotone N= Noodles W= Weights   P= Paddles S= Speedo equipment K= Kickboard     Exercises/Activities   Warm-up/Amb    Exercises      Slow forward  2 laps  HR/TR  20x    Slow sideways  2 laps  Marches  2 min    Slow backwards  2 laps  Mini-squats  20x    Medium forward    4-way SLR  20x B    Medium sideways    Hip circles/fig 8  20x B    Small shuffle    Hamstring curls  20x B    Jog    Knee extension  20x B    Braiding    Pelvic tilts  12x5\"     Bicycling  2 min  Scap squeezes          Shoulder flex/ext  x20B w/ TA    Functional    Shoulder abd/add  x20B w/ TA    Step  10x B  Shoulder H. abd/add  x20B w/ TA    Lifting    Shoulder IR/ER      Hand to opp knee    Rowing      Push down squat    Bilateral pull down      UE PNF    Push/pull      LE PNF    Push downs      Wall push ups    Arm circles      SLS  x60\" B  Elbow flex/ext          Chin tuck      Stretching    UT shrugs/rolls      Gastroc/Soleus  2x30\" B  Rocking horse      Hamstring  2x30\" B        SKTC    Other      Piriformis    Tandem 60\" B    Hip flexor          Ladder pull          Pec stretch          Post deltoid           Time In:  2:00pm    Timed Code Treatment Minutes:  15min.     Total Treatment Minutes:  45min. (1 aq individual, 1 aq group)    Treatment/Activity Tolerance:   [x] Patient tolerated treatment well [] Patient limited by fatigue   [] Patient limited by pain [] Patient limited by other medical complications  [] Other:     Prognosis: [] Good [x] Fair  [] Poor    Patient Requires Follow-up:  [x] Yes  [] No    Plan: [x] Continue per plan of care [] Alter current plan (see comments)   [] Plan of care initiated [] Hold pending MD visit [] Discharge    See Weekly Progress Note: [] Yes  [x] No  Next due:

## 2020-11-18 ENCOUNTER — NURSE ONLY (OUTPATIENT)
Dept: PRIMARY CARE CLINIC | Age: 50
End: 2020-11-18
Payer: COMMERCIAL

## 2020-11-18 PROCEDURE — 99211 OFF/OP EST MAY X REQ PHY/QHP: CPT | Performed by: NURSE PRACTITIONER

## 2020-11-19 ENCOUNTER — HOSPITAL ENCOUNTER (OUTPATIENT)
Dept: PHYSICAL THERAPY | Age: 50
Setting detail: THERAPIES SERIES
Discharge: HOME | End: 2020-11-19
Payer: COMMERCIAL

## 2020-11-19 LAB — SARS-COV-2: NOT DETECTED

## 2020-11-19 PROCEDURE — 97150 GROUP THERAPEUTIC PROCEDURES: CPT

## 2020-11-19 PROCEDURE — 97113 AQUATIC THERAPY/EXERCISES: CPT

## 2020-11-19 PROCEDURE — 97110 THERAPEUTIC EXERCISES: CPT

## 2020-11-19 NOTE — PROGRESS NOTES
Physical Therapy Daily Treatment/Progress Note    Date:  2020    Patient Name:  Mitali Navarro    :  1970  MRN: [id number]  Restrictions/Precautions:    Medical/Treatment Diagnosis Information:  · Diagnosis: Lumbar DDD  Insurance/Certification information:  PT Insurance Information: [employer] - no stim, no ionto, 30 visits per year  Physician Information:  Referring Practitioner: Shane Barrientos CNP  Plan of care signed (Y/N):  N  Visit# / total visits:  10/10     G-Code (if applicable): Modified Oswestry  54%  20     74%  20     60%  10/13/20    Medicare Cap (if applicable):  n/a = total amount used, updated 2020    Time in:  1:15      Timed Treatment: 15 Total Treatment Time:  15  Time out: 1:30  ________________________________________________________________________________________    Pain Level:    /10  SUBJECTIVE:  Pt reports that she feels the the pool is helpful - helps to stretch out her muscles. Feels improved pain for about a day afterwards. Hoping that continued aquatic PT will extend to longer duration of improved stiffness. Pain continues to limit all daily activities for patient.      OBJECTIVE:     Exercise/Equipment Resistance/Repetitions Other comments          LTR   10x B HEP   TA set  10x5\"  HEP   GS 10x5\" HEP                                                                                                                 Other Therapeutic Activities:      Manual Treatments:         Modalities:      Test/Measurements:      Observation/Palpation  Posture: Poor  Observation: gait - pt ambulated with decreased L hip extension, B Trendelenburg, decreased R arm swing, forward flexed and no trunk rotation present, femoral ambulator  Body Mechanics: SLS - LLE weight shift and Trendelenburg noted B, unsteady on R LE; unable to DL squat due to pain in knees     AROM RLE (degrees)  RLE General AROM: hip flexion 90 degrees, hip IR/ER 15 degrees, knee flexion 100, knee extension lacking 10 degrees  AROM LLE (degrees)  LLE General AROM: hip flexion 90 degrees, hip IR/ER 15 degrees, knee flexion 115, knee extension lacking 10 degrees  Spine  Lumbar: flexion WFL, SB B decreased 50%, extension decreased 25% (At eval: flexion WFL, SB R decreased 50%, L decreased 75%, extension decreased 50% with pain during movement)  Special Tests: (-) SLR and slump B, (-) Babinski, (-) clonus, 1+ reflexes throughout LE     Strength RLE  Strength RLE: Exception  R Hip Flexion: 4/5 (3-/5 at eval)  R Hip Extension: 4-/5 (3-/5 at eval)  R Hip ABduction: 4/5 (3+/5 at eval)  R Hip External Rotation: 4/5 (3+/5 at eval)  R Knee Flexion: 4+/5 (4-/5 at eval)  R Knee Extension: 4+/5 (4/5 at eval)  R Ankle Dorsiflexion: 4+/5  Strength LLE  L Hip Flexion: 4/5 (3-/5 at eval)  L Hip Extension: 4-/5 (3-/5 at eval)  L Hip ABduction: 4/5 (4-/5 at eval)  L Knee Flexion: 4+/5 (4/5 at eval)  L Knee Extension: 4+/5 (4/5 at eval)  L Ankle Dorsiflexion: 4+/5  Strength Other  Other: prone glute activation - fair (+) RLE, poor (+) LLE  Sensation  Overall Sensation Status: WNL(to light touch)       ASSESSMENT:  After 10 aquatic PT sessions, pt is demonstrating mild improvement in lower extremity strength and mild improvement in gait and lumbar ROM. Her Modified Oswestry also improved from both previous scores down to 54% disability. Recommend continued aquatic PT, requesting additional visits at 2x/week for 5 more weeks.      Treatment/Activity Tolerance:   [x]Patient tolerated treatment well [] Patient limited by fatique  []Patient limited by pain [] Patient limited by other medical complications  [] Other:     Goals:          Long term goals  Time Frame for Long term goals : 5 weeks  Long term goal 1: Pt will be independent with HEP - met  Long term goal 2: Pt will tolerate 30 minutes aquatic PT - met  Long term goal 3: Pt will increase B hip strength to grossly 4/5 - improving, see above  Long term goal 4: Pt will improve Modified Oswestry by 7 points to indicate significant change - not met   Long term goal 5: Pt will ambulate with increased hip extension and arm swing B - improving     Plan: [x] Continue per plan of care [x] Alter current plan (see comments)   [] Plan of care initiated [] Hold pending MD visit [] Discharge      Plan for Next Session:  Aquatic PT    Re-Certification Due Date:         Signature:  Constanza Casanova PT , DPT [id number]      If you have any questions or concerns, please don't hesitate to call.   Thank you for your referral.    Physician Signature:________________________________Date:__________________  By signing above, therapists plan is approved by physician    Please return by fax to [phone]

## 2020-11-19 NOTE — FLOWSHEET NOTE
"  Migraine Headache: Stages and Treatment    A migraine headache tends to progress in stages. Learning these stages can help you better understand what is happening. Then you can learn ways to reduce pain and relieve other symptoms. Methods for relieving your symptoms include self-care and medicines.  Migraine stages  Migraines tend to progress through 4 stages. Many people don't have all stages, and stages may differ with each headache:    Prodrome. A few hours to a day or so before the headache, you may feel tired, (yawning many times), uneasy, or lopez. You may also feel bloated or crave certain foods.    Aura. Up to an hour before the headache starts, some migraine sufferers experience aura--flashing lights, blind spots, other vision problems, confusion, difficulty speaking, or other neurologic symptoms.    Headache. Moderate to severe pain affects one side of the head and then can spread to both sides, often along with nausea. You may be highly sensitive to light, sound, and odors. Vomiting or diarrhea may also happen. This stage lasts 4 to 72 hours.    Postdrome. After your headache ends, you may feel tired, achy, and \"washed out.\" This may last for a day or so.  Self-care during a migraine  Here is what you can do:    Use a cold compress. Wrap a thin cloth around a cold pack, a cold can of soda, or a bag of frozen vegetables. Apply this to your temple or other pain site.    Drink fluids. If nausea makes it hard to drink, try sucking on ice.    Rest. If possible, lie down. Try not to bend over, as this may increase your pain. Sometimes laying in a dark quiet room can help the migraine from being aggravated.      Try caffeine. Some people find that drinking fluids with caffeine, such as coffee or tea, helps to lessen migraine pain.  Using medicines  Work with your healthcare provider to find the right medicines for you. Medicines for migraine may relieve pain (analgesics), relieve nausea, or attack the " Physical Therapy Aquatic Flow Sheet  Date:  11/19/2020    Patient Name:  Joan Rincon    Restrictions:  none  Medical/Treatment Diagnosis Information:  · Diagnosis: Lumbar DDD  Insurance/Certification information:  PT Insurance Information: [employer] - no stim, no ionto, 30 visits per year  Physician Information:  Referring Practitioner: Laurine Romberg, CNP  Plan of care signed (Y/N):  N  Visit# / total visits:  10/10            Pain level: 8/10   Electronically signed by:  Loulou Calixto PT    Medicare Cap (if applicable):  n/a = total amount used, updated 11/19/2020    Key  B= Belt DB= Dumbells T= Theratube   H= Hydrotone N= Noodles W= Weights   P= Paddles S= Speedo equipment K= Kickboard     Exercises/Activities   Warm-up/Amb    Exercises      Slow forward  2 laps  HR/TR  20x    Slow sideways  2 laps  Marches  2 min    Slow backwards  2 laps  Mini-squats  20x    Medium forward    4-way SLR  20x B    Medium sideways    Hip circles/fig 8  20x B    Small shuffle    Hamstring curls  20x B    Jog    Knee extension  20x B    Braiding    Pelvic tilts  12x5\"     Bicycling  2 min  Scap squeezes          Shoulder flex/ext  x20B w/ TA    Functional    Shoulder abd/add  x20B w/ TA    Step  10x B  Shoulder H. abd/add  x20B w/ TA    Lifting    Shoulder IR/ER      Hand to opp knee    Rowing      Push down squat    Bilateral pull down      UE PNF    Push/pull      LE PNF    Push downs      Wall push ups    Arm circles      SLS  x60\" B  Elbow flex/ext          Chin tuck      Stretching    UT shrugs/rolls      Gastroc/Soleus  2x30\" B  Rocking horse      Hamstring  2x30\" B        SKTC    Other      Piriformis    Tandem 60\" B    Hip flexor          Ladder pull          Pec stretch          Post deltoid           Time In:  2:00pm    Timed Code Treatment Minutes:  25min.     Total Treatment Minutes:  45min. (2 aq individual, 1 aq group)    Treatment/Activity Tolerance:   [x] Patient tolerated treatment well [] Patient limited by fatigue   [] Patient limited by pain [] Patient limited by other medical complications  [] Other:     Prognosis: [] Good [x] Fair  [] Poor    Patient Requires Follow-up:  [x] Yes  [] No    Plan: [x] Continue per plan of care [] Alter current plan (see comments)   [] Plan of care initiated [] Hold pending MD visit [] Discharge    See Weekly Progress Note: [] Yes  [x] No  Next due: migraine's root causes (migraine-specific medicines).  Rebound headache  Taking analgesics each day, or even several times a week, may lead to more frequent and severe headaches. These are called rebound headaches. If you think you're having rebound headaches, tell your healthcare provider. He or she can help you safely decrease your medicine. Rebound caffeine withdrawal headaches can also happen.    Date Last Reviewed: 10/9/2015    0224-9820 [employer]. [address] [place] [address], [place] [address]. All rights reserved. This information is not intended as a substitute for professional medical care. Always follow your healthcare professional's instructions.          Preventing Migraine Headaches: Triggers     Red wine is a common migraine trigger.     The first step in preventing migraines is to learn what triggers them. You may then be able to control your triggers to avoid or reduce the severity of your migraines.  Know your triggers  Be aware that you may have more than one trigger, and that some triggers may work together. Common migraine triggers include:    Food and nutrition. Skipping meals or not drinking enough water can trigger headaches. So can certain foods, such as caffeine, monosodium glutamate (MSG), aged cheese, or sausage.    Alcohol. Red wine and other alcoholic beverages are common migraine triggers.    Chemicals. Scents, cleaning products, gasoline, glue, perfume, and paint can be triggers. So can tobacco smoke, including secondhand smoke.    Emotions. Stress can trigger headaches or make them worse once they begin.    Sleep disruption. Staying up late, sleeping late, and traveling across time zones can disrupt your sleep cycle, triggering headaches.    Hormones. Many women notice that migraines tend to happen at a certain point in their menstrual cycle. Birth control pills or hormone replacement therapy may also trigger migraines.    Environment and weather. Air travel, changes in  altitude, air pressure changes, hot sun, or bright or flashing lights can be triggers.  Control your triggers  These are some of the things you can do to try to control triggers:    Avoid triggers if you can. For example, stay clear of alcohol and foods that trigger your headaches. Use unscented household products. Keep regular sleep habits. Manage stress to help control emotional triggers.    Change your behavior at times when triggers can't be avoided. For example, make sure to get enough rest and drink plenty of water while you're traveling. Make sure to carry a hat, sunglasses, and your medicines. Be alert for migraine symptoms, so you can treat a migraine early if it happens.  Date Last Reviewed: 10/9/2015    0798-3486 [employer]. [address] [place] [address], [place] [address]. All rights reserved. This information is not intended as a substitute for professional medical care. Always follow your healthcare professional's instructions.        About Migraine Headaches  What is a migraine headache?  A migraine is a very painful type of headache. It may last a few hours or days. During a migraine, you may have vision problems and feel sick to your stomach.  Migraines are three times more common in women than in men. Once they start, you may get them for the rest of your life. They may occur less often as you age.  What causes it?  We don't know the exact cause, but many things can trigger a migraine. These include:    Stress and anxiety    Lack of food or sleep    Foods and drinks that contain tyramine, such as:    Red karol and some beers    Aged cheeses (like cheddar or blue cheese)    Yeast    Aged, dried or cured meats    Fermented foods like sauerkraut, soy sauce, miso and lucero chi    Too much light    Chemicals (gasoline, cleaning products, perfume, glue, etc.)    Weather changes    Certain medicines    Hormone changes (in women).  What are symptoms?  Some people can tell when they're about to have a  migraine. They may see flashing lights or zigzag lines in front of their eyes. Or they may lose their vision for a short time.  With a migraine, you may:    Feel pain or pulsing on one side of the head. For some people, the entire head hurts.    Be very sensitive to light and sound.    Feel nauseated (sick to your stomach) and vomit (throw up).  How is it treated?  Your care team may suggest medicine to prevent or relieve your symptoms. Once you start having migraines, you may also need medicine to keep them from getting worse.   Take your medicine at the first sign of a migraine. It may take several tries to find a medicine that works for you.   When a migraine comes:    Lie down in a quiet, dark room. Try not to bend over, as this may cause more pain.    Put a cold pack on your head. Try a bag of frozen vegetables, wrapped with a thin cloth.    Drink extra fluids. If you can't drink, suck on ice chips.  How can I prevent migraines?  It will help to keep a migraine diary. By keeping a diary, you may find the cause of your headaches. Once you know the cause, you can take steps to prevent migraines in the future.  It also helps to live a healthy lifestyle. This means:    Get regular exercise. (If exercise triggers your headaches, tell your doctor.)    Drink plenty of water.    Eat healthy meals at regular times.    Try to go to bed and get up and regular times.    Don't smoke. Avoid second-hand smoke.    Avoid caffeine. Coffee, tea and soda may help a migraine. But if you drink them too often, they can cause migraines.    Find ways to relax, have fun and reduce stress in your life.    Try complementary therapies (yoga, acupuncture, massage, biofeedback, etc.).  When should I call my clinic?  Call your clinic at once if you have new or unusual symptoms, such as:     Pain that gets worse or lasts more than 24 hours.    Slurred speech or problems talking.    A weak arm or leg that you can't move normally.    A fever  over 100 F (37.8 C), taken under the tongue.    Stiff neck.    Vomiting (throwing up) for several hours.  For more information about migraines  Contact the [employer] [place] for Headache Education (ACHE) at 1-293.485.5795 or www.headaches.org.  Local providers of complementary therapies  These services may not be covered by insurance. Check your insurance plan.  [facility]  [phone]   Includes pain education, behavioral therapy,   trigger point injections and more.  [facility]   [phone]   Includes acupuncture, massage, myofascial release.  [facility]  [phone]  www.takingcharge.[facility]  Includes mindfulness, meditation, yoga.  Community Education     [place]: commed.mpls.[facility]mn.[facility]: commedprograms.[facility].org  Look for programs on yoga, mindfulness, etc.  [facility]   [phone]  www.[facility].org  Includes mindfulness, yoga, body-mind skills.  For informational purposes only. Not to replace the advice of your health care provider.   Copyright   2011 [facility]. All rights reserved. [employer] [id number] - 11/15.  Push fluids, rest.  Motrin as directed for episodes of migraine.  Zofran as prescribed may be used for nausea associated with headache.  Appointment with pediatric neurology for further discussion and potential neuro imaging.

## 2020-11-24 ENCOUNTER — HOSPITAL ENCOUNTER (OUTPATIENT)
Dept: PHYSICAL THERAPY | Age: 50
Setting detail: THERAPIES SERIES
Discharge: HOME | End: 2020-11-24
Payer: COMMERCIAL

## 2020-12-01 ENCOUNTER — HOSPITAL ENCOUNTER (OUTPATIENT)
Dept: PHYSICAL THERAPY | Age: 50
Setting detail: THERAPIES SERIES
Discharge: HOME | End: 2020-12-01
Payer: COMMERCIAL

## 2020-12-01 PROCEDURE — 97150 GROUP THERAPEUTIC PROCEDURES: CPT

## 2020-12-01 PROCEDURE — 97113 AQUATIC THERAPY/EXERCISES: CPT

## 2020-12-01 NOTE — FLOWSHEET NOTE
[address] and Therapy, [facility], [address], [address]  Phone: [phone]  Fax [phone]        Physical Therapy Aquatic Flow Sheet  Date:  12/1/2020    Patient Name:  Lai Anthony    Restrictions:  none  Medical/Treatment Diagnosis Information:  · Diagnosis: Lumbar DDD  Insurance/Certification information:  PT Insurance Information: [employer] - no stim, no ionto, 30 visits per year  Physician Information:  Referring Practitioner: Sandrita Carlson CNP  Plan of care signed (Y/N):  N  Visit# / total visits:  10/10, 1/10            Pain level: 8/10   Electronically signed by:  Bryon Moore PT    Medicare Cap (if applicable):  n/a = total amount used, updated 12/1/2020    Key  B= Belt DB= Dumbells T= Theratube   H= Hydrotone N= Noodles W= Weights   P= Paddles S= Speedo equipment K= Kickboard     Exercises/Activities   Warm-up/Amb    Exercises      Slow forward  2 laps  HR/TR  20x    Slow sideways  2 laps  Marches  2 min    Slow backwards  2 laps  Mini-squats  20x    Medium forward    4-way SLR  20x B    Medium sideways    Hip circles/fig 8  20x B    Small shuffle    Hamstring curls  20x B    Jog    Knee extension  20x B    Braiding    Pelvic tilts  12x5\"     Bicycling  2 min  Scap squeezes          Shoulder flex/ext  x20B w/ TA    Functional    Shoulder abd/add  x20B w/ TA    Step  15x B  Shoulder H. abd/add  x20B w/ TA    Lifting    Shoulder IR/ER      Hand to opp knee    Rowing      Push down squat    Bilateral pull down      UE PNF    Push/pull      LE PNF    Push downs      Wall push ups    Arm circles      SLS  2x60\" B  Elbow flex/ext          Chin tuck      Stretching    UT shrugs/rolls      Gastroc/Soleus  2x30\" B  Rocking horse      Hamstring  2x30\" B        SKTC    Other      Piriformis    Tandem 2x60\" B    Hip flexor    Corner Hang x60\"     Ladder pull          Pec stretch          Post deltoid           Time In: 2:10pm    Timed Code Treatment Minutes:  25min.     Total Treatment Minutes:  50min. (2 aq individual, 1 aq group)    Treatment/Activity Tolerance:   [x] Patient tolerated treatment well [] Patient limited by fatigue   [] Patient limited by pain [] Patient limited by other medical complications  [] Other:     Prognosis: [] Good [x] Fair  [] Poor    Patient Requires Follow-up:  [x] Yes  [] No    Plan: [x] Continue per plan of care [] Alter current plan (see comments)   [] Plan of care initiated [] Hold pending MD visit [] Discharge    See Weekly Progress Note: [] Yes  [x] No  Next due:

## 2020-12-03 ENCOUNTER — HOSPITAL ENCOUNTER (OUTPATIENT)
Dept: PHYSICAL THERAPY | Age: 50
Setting detail: THERAPIES SERIES
Discharge: HOME | End: 2020-12-03
Payer: COMMERCIAL

## 2020-12-03 PROCEDURE — 97113 AQUATIC THERAPY/EXERCISES: CPT

## 2020-12-03 NOTE — FLOWSHEET NOTE
Josh  79. and Therapy, [facility], [address] [place] [address] Dr  Phone: [phone]  Fax [phone]        Physical Therapy Aquatic Flow Sheet  Date:  12/3/2020    Patient Name:  Bhanu Lwason    Restrictions:  none  Medical/Treatment Diagnosis Information:  · Diagnosis: Lumbar DDD  Insurance/Certification information:  PT Insurance Information: [employer] - no stim, no ionto, 30 visits per year  Physician Information:  Referring Practitioner: Los Gillette CNP  Plan of care signed (Y/N):  N  Visit# / total visits:  10/10, 2/10            Pain level: 10/10 \"it has been a rough 24hours\"   Electronically signed by:  Trevor Arellano, PT, DPT    Medicare Cap (if applicable):  n/a = total amount used, updated 12/3/2020    Key  B= Belt DB= Dumbells T= Theratube   H= Hydrotone N= Noodles W= Weights   P= Paddles S= Speedo equipment K= Kickboard     Exercises/Activities   Warm-up/Amb    Exercises      Slow forward  2 laps  HR/TR  20x    Slow sideways  2 laps  Marches  2 min    Slow backwards  2 laps  Mini-squats  20x    Medium forward    4-way SLR  20x B    Medium sideways    Hip circles/fig 8  20x B    Small shuffle    Hamstring curls  20x B    Jog    Knee extension  20x B    Braiding    Pelvic tilts  12x5\"     Bicycling  2 min  Scap squeezes          Shoulder flex/ext  x20B w/ TA    Functional    Shoulder abd/add  x20B w/ TA    Step  12x B  Shoulder H. abd/add  x20B w/ TA    Lifting    Shoulder IR/ER      Hand to opp knee    Rowing      Push down squat    Bilateral pull down      UE PNF    Push/pull      LE PNF    Push downs      Wall push ups    Arm circles      SLS  2x60\" B  Elbow flex/ext          Chin tuck      Stretching    UT shrugs/rolls      Gastroc/Soleus  2x30\" B  Rocking horse      Hamstring  2x30\" B        SKTC    Other      Piriformis    Tandem 2x60\" B    Hip flexor    Corner Hang x2min.     Ladder pull          Pec stretch          Post deltoid           Time In:  2:20pm    Timed Code Treatment Minutes:  45min.     Total Treatment Minutes:  45min. (3 aq individual, )    Treatment/Activity Tolerance:   [x] Patient tolerated treatment well [] Patient limited by fatigue   [] Patient limited by pain [] Patient limited by other medical complications  [] Other:     Prognosis: [] Good [x] Fair  [] Poor    Patient Requires Follow-up:  [x] Yes  [] No    Plan: [x] Continue per plan of care [] Alter current plan (see comments)   [] Plan of care initiated [] Hold pending MD visit [] Discharge    See Weekly Progress Note: [] Yes  [x] No  Next due:

## 2020-12-04 NOTE — PROGRESS NOTES
Occupational Therapy   Occupational Therapy Initial Assessment and Treatment Note  Date: 2018   Patient Name: Noemi Marrero  MRN: [id number]     : 1970    Date of Service: 2018    Discharge Recommendations:  Continue to assess pending progress  OT Equipment Recommendations  Other: continue to assess      Patient Diagnosis(es): The encounter diagnosis was Osteoarthritis of left knee, unspecified osteoarthritis type. has a past medical history of ADHD (attention deficit hyperactivity disorder); Adopted; Blood transfusion; CHF (congestive heart failure) ([address] Degenerative disc disease; DVT (deep venous thrombosis) ([address] Fibromyalgia; History of blood transfusion; Hx of blood clots; Hypertension; Hypertrophic cardiomyopathy ([address] Hypoglycemia; Lupus anticoagulant disorder ([address] Narcolepsy; PE (pulmonary embolism); Pneumonia; Pulmonary edema; Rheumatoid arthritis ([address] and Sleep apnea. has a past surgical history that includes Cholecystectomy, laparoscopic; Knee arthroscopy (Left, ); Foot surgery; Umbilical hernia repair (2012); Knee arthroscopy (Left, 2016); hysteroscopy (2017); Sleeve Gastrectomy (2017); and Knee Arthroplasty (Left, 2018).     Restrictions  Restrictions/Precautions  Restrictions/Precautions: General Precautions, Fall Risk, Weight Bearing  Required Braces or Orthoses?: Yes  Lower Extremity Weight Bearing Restrictions  Left Lower Extremity Weight Bearing: Weight Bearing As Tolerated  Required Braces or Orthoses  Left Lower Extremity Brace: Knee Brace  LLE Brace Type: Knee immobilizer    Subjective   General  Chart Reviewed: Yes  Patient assessed for rehabilitation services?: Yes  Family / Caregiver Present: No  Referring Practitioner: ROSITA Baumann  Diagnosis: L knee OA, s/p LEFT TOTAL KNEE REPLACEMENT 18  Subjective  Subjective: Pt in bed on arrival, agreeable to eval and treat  General Comment  Comments: Per RN arnol to normal sinus rhythm

## 2020-12-08 ENCOUNTER — HOSPITAL ENCOUNTER (OUTPATIENT)
Dept: PHYSICAL THERAPY | Age: 50
Setting detail: THERAPIES SERIES
Discharge: HOME | End: 2020-12-08
Payer: COMMERCIAL

## 2020-12-08 PROCEDURE — 97113 AQUATIC THERAPY/EXERCISES: CPT

## 2020-12-08 PROCEDURE — 97150 GROUP THERAPEUTIC PROCEDURES: CPT

## 2020-12-08 NOTE — FLOWSHEET NOTE
[facility] [address] and Therapy, [facility], [address], [address]  Phone: [phone]  Fax [phone]        Physical Therapy Aquatic Flow Sheet  Date:  12/8/2020    Patient Name:  Anupama Frias    Restrictions:  none  Medical/Treatment Diagnosis Information:  · Diagnosis: Lumbar DDD  Insurance/Certification information:  PT Insurance Information: [employer] - no stim, no ionto, 30 visits per year  Physician Information:  Referring Practitioner: Abrahan Fairchild CNP  Plan of care signed (Y/N):  N  Visit# / total visits:  10/10, 3/10            Pain level: 7/10 \"I am feeling a little better today\"   Electronically signed by:  Dylan Ellis, PT, DPT    Medicare Cap (if applicable):  n/a = total amount used, updated 12/8/2020    Key  B= Belt DB= Dumbells T= Theratube   H= Hydrotone N= Noodles W= Weights   P= Paddles S= Speedo equipment K= Kickboard     Exercises/Activities   Warm-up/Amb    Exercises      Slow forward  2 laps  HR/TR  25x    Slow sideways  2 laps  Marches  2 min    Slow backwards  2 laps  Mini-squats  25x    Medium forward    3-way SLR  25x B    Medium sideways    Hip circles/fig 8  25x B    Small shuffle    Hamstring curls  25x B    Jog    Knee extension  25x B    Braiding    Pelvic tilts  12x5\"     Bicycling  2 min  Scap squeezes          Shoulder flex/ext  x20B w/ TA    Functional    Shoulder abd/add  x20B w/ TA    Step  12x B  Shoulder H. abd/add  x20B w/ TA    Lifting    Shoulder IR/ER      Hand to opp knee    Rowing      Push down squat    Bilateral pull down      UE PNF    Push/pull      LE PNF    Push downs      Wall push ups    Arm circles      SLS  2x60\" B  Elbow flex/ext          Chin tuck      Stretching    UT shrugs/rolls      Gastroc/Soleus  2x30\" B  Rocking horse      Hamstring  2x30\" B        SKTC    Other      Piriformis    Tandem 2x60\" B    Hip flexor    Corner Hang x2min.     Ladder pull          Pec stretch Post deltoid           Time In:  2:18pm    Timed Code Treatment Minutes:  50min.     Total Treatment Minutes:  50min. (2 aq individual, 1 aq group)    Treatment/Activity Tolerance:   [x] Patient tolerated treatment well [] Patient limited by fatigue   [] Patient limited by pain [] Patient limited by other medical complications  [] Other:     Prognosis: [] Good [x] Fair  [] Poor    Patient Requires Follow-up:  [x] Yes  [] No    Plan: [x] Continue per plan of care [] Alter current plan (see comments)   [] Plan of care initiated [] Hold pending MD visit [] Discharge    See Weekly Progress Note: [] Yes  [x] No  Next due:

## 2020-12-10 ENCOUNTER — HOSPITAL ENCOUNTER (OUTPATIENT)
Dept: PHYSICAL THERAPY | Age: 50
Setting detail: THERAPIES SERIES
Discharge: HOME | End: 2020-12-10
Payer: COMMERCIAL

## 2020-12-10 PROCEDURE — 97150 GROUP THERAPEUTIC PROCEDURES: CPT

## 2020-12-10 PROCEDURE — 97113 AQUATIC THERAPY/EXERCISES: CPT

## 2020-12-10 NOTE — FLOWSHEET NOTE
Josh  78. and Therapy, [facility], [address] [place] [address] Dr  Phone: [phone]  Fax [phone]        Physical Therapy Aquatic Flow Sheet  Date:  12/10/2020    Patient Name:  Anupama Frias    Restrictions:  none  Medical/Treatment Diagnosis Information:  · Diagnosis: Lumbar DDD  Insurance/Certification information:  PT Insurance Information: [employer] - no stim, no ionto, 30 visits per year  Physician Information:  Referring Practitioner: Abrahan Fairchild CNP  Plan of care signed (Y/N):  N  Visit# / total visits:  10/10, 4/10            Pain level: 7/10 \"I am feeling a little better\"   Electronically signed by:  Dylan Ellis, PT, DPT    Medicare Cap (if applicable):  n/a = total amount used, updated 12/10/2020    Key  B= Belt DB= Dumbells T= Theratube   H= Hydrotone N= Noodles W= Weights   P= Paddles S= Speedo equipment K= Kickboard     Exercises/Activities   Warm-up/Amb    Exercises      Slow forward  2 laps  HR/TR  25x    Slow sideways  2 laps  Marches  2 min    Slow backwards  2 laps  Mini-squats  25x    Medium forward    3-way SLR  25x B    Medium sideways    Hip circles/fig 8  25x B    Small shuffle    Hamstring curls  25x B    Jog    Knee extension  25x B    Braiding    Pelvic tilts  12x5\"     Bicycling  2 min  Scap squeezes          Shoulder flex/ext  x20B w/ TA    Functional    Shoulder abd/add  x20B w/ TA    Step  15x B  Shoulder H. abd/add  x20B w/ TA    Lifting    Shoulder IR/ER      Hand to opp knee    Rowing      Push down squat    Bilateral pull down      UE PNF    Push/pull      LE PNF    Push downs      Wall push ups    Arm circles      SLS  2x60\" B  Elbow flex/ext          Chin tuck      Stretching    UT shrugs/rolls      Gastroc/Soleus  2x30\" B  Rocking horse      Hamstring  2x30\" B        SKTC    Other      Piriformis    Tandem 2x60\" B    Hip flexor    Corner Hang x2min.     Ladder pull          Pec stretch          Post deltoid           Time In:  2:15pm    Timed Code Treatment Minutes:  45min.     Total Treatment Minutes:  45min. (1 aq individual, 1 aq group)    Treatment/Activity Tolerance:   [x] Patient tolerated treatment well [] Patient limited by fatigue   [] Patient limited by pain [] Patient limited by other medical complications  [] Other:     Prognosis: [] Good [x] Fair  [] Poor    Patient Requires Follow-up:  [x] Yes  [] No    Plan: [x] Continue per plan of care [] Alter current plan (see comments)   [] Plan of care initiated [] Hold pending MD visit [] Discharge    See Weekly Progress Note: [] Yes  [x] No  Next due:

## 2020-12-15 ENCOUNTER — HOSPITAL ENCOUNTER (OUTPATIENT)
Dept: PHYSICAL THERAPY | Age: 50
Setting detail: THERAPIES SERIES
Discharge: HOME | End: 2020-12-15
Payer: COMMERCIAL

## 2020-12-15 PROCEDURE — 97113 AQUATIC THERAPY/EXERCISES: CPT

## 2020-12-15 PROCEDURE — 97150 GROUP THERAPEUTIC PROCEDURES: CPT

## 2020-12-15 NOTE — FLOWSHEET NOTE
Josh  78. and Therapy, [facility], [address] [place] [address] Dr  Phone: [phone]  Fax [phone]        Physical Therapy Aquatic Flow Sheet  Date:  12/15/2020    Patient Name:  Charlotte Kramer    Restrictions:  none  Medical/Treatment Diagnosis Information:  · Diagnosis: Lumbar DDD  Insurance/Certification information:  PT Insurance Information: [employer] - no stim, no ionto, 30 visits per year  Physician Information:  Referring Practitioner: Dilip Aguirre CNP  Plan of care signed (Y/N):  N  Visit# / total visits:  10/10, 5/10            Pain level: 7/10 \"I am feeling a little better\"   Electronically signed by:  Joan Russo, PT, DPT    Medicare Cap (if applicable):  n/a = total amount used, updated 12/15/2020    Key  B= Belt DB= Dumbells T= Theratube   H= Hydrotone N= Noodles W= Weights   P= Paddles S= Speedo equipment K= Kickboard     Exercises/Activities   Warm-up/Amb    Exercises      Slow forward  2 laps  HR/TR  25x    Slow sideways  2 laps  Marches  2 min    Slow backwards  2 laps  Mini-squats  25x    Medium forward    3-way SLR  25x B    Medium sideways    Hip circles/fig 8  25x B    Small shuffle    Hamstring curls  25x B    Jog    Knee extension  25x B    Braiding    Pelvic tilts  12x5\"     Bicycling  2 min  Scap squeezes          Shoulder flex/ext  x20B w/ TA    Functional    Shoulder abd/add  x20B w/ TA    Step  15x B  Shoulder H. abd/add  x20B w/ TA    Lifting    Shoulder IR/ER      Hand to opp knee    Rowing      Push down squat    Bilateral pull down      UE PNF    Push/pull      LE PNF    Push downs      Wall push ups    Arm circles      SLS  2x60\" B  Elbow flex/ext          Chin tuck      Stretching    UT shrugs/rolls      Gastroc/Soleus  2x30\" B  Rocking horse      Hamstring  2x30\" B        SKTC    Other      Piriformis    Tandem 2x60\" B    Hip flexor    Corner Hang x2min.     Ladder pull          Pec stretch Post deltoid           Time In:  2:00pm    Timed Code Treatment Minutes:  25min.     Total Treatment Minutes:  45min. (2 aq individual, 1 aq group)    Treatment/Activity Tolerance:   [x] Patient tolerated treatment well [] Patient limited by fatigue   [] Patient limited by pain [] Patient limited by other medical complications  [] Other:     Prognosis: [] Good [x] Fair  [] Poor    Patient Requires Follow-up:  [x] Yes  [] No    Plan: [x] Continue per plan of care [] Alter current plan (see comments)   [] Plan of care initiated [] Hold pending MD visit [] Discharge    See Weekly Progress Note: [] Yes  [x] No  Next due:

## 2020-12-17 ENCOUNTER — HOSPITAL ENCOUNTER (OUTPATIENT)
Dept: PHYSICAL THERAPY | Age: 50
Setting detail: THERAPIES SERIES
Discharge: HOME | End: 2020-12-17
Payer: COMMERCIAL

## 2020-12-22 ENCOUNTER — HOSPITAL ENCOUNTER (OUTPATIENT)
Dept: PHYSICAL THERAPY | Age: 50
Setting detail: THERAPIES SERIES
Discharge: HOME | End: 2020-12-22
Payer: COMMERCIAL

## 2020-12-22 PROCEDURE — 97150 GROUP THERAPEUTIC PROCEDURES: CPT

## 2020-12-22 PROCEDURE — 97113 AQUATIC THERAPY/EXERCISES: CPT

## 2020-12-22 NOTE — FLOWSHEET NOTE
Josh [address] and Therapy, [facility], [address] [facility], [address]  Phone: [phone]  Fax [phone]        Physical Therapy Aquatic Flow Sheet  Date:  12/22/2020    Patient Name:  Hawk Godoy    Restrictions:  none  Medical/Treatment Diagnosis Information:  · Diagnosis: Lumbar DDD  Insurance/Certification information:  PT Insurance Information: [employer] - no stim, no ionto, 30 visits per year  Physician Information:  Referring Practitioner: Hyun Nicholson CNP  Plan of care signed (Y/N):  N  Visit# / total visits:  10/10, 6/10            Pain level: 7/10 \"I am feeling a little better\"   Electronically signed by:  Miranda Wolff, PT, DPT    Medicare Cap (if applicable):  n/a = total amount used, updated 12/22/2020    Key  B= Belt DB= Dumbells T= Theratube   H= Hydrotone N= Noodles W= Weights   P= Paddles S= Speedo equipment K= Kickboard     Exercises/Activities   Warm-up/Amb    Exercises      Slow forward  2 laps  HR/TR  25x    Slow sideways  2 laps  Marches  2 min    Slow backwards  2 laps  Mini-squats  25x    Medium forward    3-way SLR  25x B    Medium sideways    Hip circles/fig 8  25x B    Small shuffle    Hamstring curls  25x B    Jog    Knee extension  25x B    Braiding    Pelvic tilts  12x5\"     Bicycling  2 min  Scap squeezes          Shoulder flex/ext  x20B w/ TA    Functional    Shoulder abd/add  x20B w/ TA    Step  25x B  Shoulder H. abd/add  x20B w/ TA    Lifting    Shoulder IR/ER      Hand to opp knee    Rowing      Push down squat    Bilateral pull down      UE PNF    Push/pull      LE PNF    Push downs      Wall push ups    Arm circles      SLS  2x60\" B  Elbow flex/ext          Chin tuck      Stretching    UT shrugs/rolls      Gastroc/Soleus  2x30\" B  Rocking horse      Hamstring  2x30\" B        SKTC    Other      Piriformis    Tandem 2x60\" B    Hip flexor    Corner Hang x2min.     Ladder pull          Pec stretch          Post deltoid           Time In:  2:00pm    Timed Code Treatment Minutes:  25min.     Total Treatment Minutes:  45min. (2 aq individual, 1 aq group)    Treatment/Activity Tolerance:   [x] Patient tolerated treatment well [] Patient limited by fatigue   [] Patient limited by pain [] Patient limited by other medical complications  [] Other:     Prognosis: [] Good [x] Fair  [] Poor    Patient Requires Follow-up:  [x] Yes  [] No    Plan: [x] Continue per plan of care [] Alter current plan (see comments)   [] Plan of care initiated [] Hold pending MD visit [] Discharge    See Weekly Progress Note: [] Yes  [x] No  Next due:

## 2020-12-29 ENCOUNTER — APPOINTMENT (OUTPATIENT)
Dept: PHYSICAL THERAPY | Age: 50
End: 2020-12-29
Payer: COMMERCIAL

## 2021-01-05 ENCOUNTER — HOSPITAL ENCOUNTER (OUTPATIENT)
Dept: PHYSICAL THERAPY | Age: 51
Setting detail: THERAPIES SERIES
Discharge: HOME | End: 2021-01-05
Payer: COMMERCIAL

## 2021-01-05 PROCEDURE — 97110 THERAPEUTIC EXERCISES: CPT

## 2021-01-05 PROCEDURE — 97113 AQUATIC THERAPY/EXERCISES: CPT

## 2021-01-05 PROCEDURE — 97150 GROUP THERAPEUTIC PROCEDURES: CPT

## 2021-01-05 PROCEDURE — 97112 NEUROMUSCULAR REEDUCATION: CPT

## 2021-01-05 NOTE — PROGRESS NOTES
Physical Therapy Daily Treatment/Progress Note    Date:  2021    Patient Name:  Katelyn Dennis    :  1970  MRN: [id number]  Restrictions/Precautions:    Medical/Treatment Diagnosis Information:  · Diagnosis: Lumbar DDD  Insurance/Certification information:  PT Insurance Information: [employer] - no stim, no ionto, 30 visits per year  Physician Information:  Referring Practitioner: Ludwig Bravo CNP  Plan of care signed (Y/N):  N  Visit# / total visits:  7/10     G-Code (if applicable): Modified Oswestry  62% 21     54%  20     74%  20     60%  10/13/20    Medicare Cap (if applicable):  n/a = total amount used, updated 2021    Time in:  1:15     Timed Treatment: 30 Total Treatment Time:  30  Time out: 1:45  ________________________________________________________________________________________    Pain Level:    /10  SUBJECTIVE:  Pt reports that she is getting an injection soon. Feels that the pool has helped her gain strength and flexibility. Pain does come and go now and is sometimes severe, most of the time moderate.      OBJECTIVE:     Exercise/Equipment Resistance/Repetitions Other comments          LTR   10x B HEP   TA set  10x5\"  HEP   GS 10x5\" HEP                                                                                                                 Other Therapeutic Activities:      Manual Treatments:         Modalities:      Test/Measurements:      Observation/Palpation  Posture: Poor  Observation: gait - pt ambulated with decreased L hip extension, B Trendelenburg, decreased R arm swing, forward flexed and no trunk rotation present, femoral ambulator  Body Mechanics: SLS - LLE weight shift and Trendelenburg noted B, unsteady on R LE; unable to DL squat due to pain in knees     AROM RLE (degrees)  RLE General AROM: hip flexion 90 degrees, hip IR/ER 15 degrees, knee flexion 100, knee extension lacking 10 degrees  AROM LLE (degrees) LLE General AROM: hip flexion 90 degrees, hip IR/ER 15 degrees, knee flexion 115, knee extension lacking 10 degrees  Spine  Lumbar: flexion WFL, SB B decreased 50%, extension decreased 25% (At eval: flexion WFL, SB R decreased 50%, L decreased 75%, extension decreased 50% with pain during movement)  Special Tests: (-) SLR and slump B, (-) Babinski, (-) clonus, 1+ reflexes throughout LE     Strength RLE  Strength RLE: Exception  R Hip Flexion: 4/5 (3-/5 at eval)  R Hip Extension: 4-/5 (3-/5 at eval)  R Hip ABduction: 4/5 (3+/5 at eval)  R Hip External Rotation: 4/5 (3+/5 at eval)  R Knee Flexion: 4+/5 (4-/5 at eval)  R Knee Extension: 4+/5 (4/5 at eval)  R Ankle Dorsiflexion: 4+/5  Strength LLE  L Hip Flexion: 4/5 (3-/5 at eval)  L Hip Extension: 4-/5 (3-/5 at eval)  L Hip ABduction: 4/5 (4-/5 at eval)  L Knee Flexion: 4+/5 (4/5 at eval)  L Knee Extension: 4+/5 (4/5 at eval)  L Ankle Dorsiflexion: 4+/5  Strength Other  Other: prone glute activation - fair (+) RLE, poor (+) LLE  Sensation  Overall Sensation Status: WNL(to light touch)       ASSESSMENT:  After 16 aquatic PT sessions, pt is demonstrating mild improvement in lower extremity strength and mild improvement in gait and lumbar ROM. However, her Modified Oswestry remains largely unchanged. She reports overall improvements in pain intensity and frequency and is independent with home exercises. Plan to finish plan of care with discharge at final scheduled aquatic PT session on 1/7/2021.      Treatment/Activity Tolerance:   [x]Patient tolerated treatment well [] Patient limited by fatique  []Patient limited by pain [] Patient limited by other medical complications  [] Other:     Goals:          Long term goals  Time Frame for Long term goals : 5 weeks  Long term goal 1: Pt will be independent with HEP - met  Long term goal 2: Pt will tolerate 30 minutes aquatic PT - met  Long term goal 3: Pt will increase B hip strength to grossly 4/5 - met, see above Long term goal 4: Pt will improve Modified Oswestry by 7 points to indicate significant change - not met   Long term goal 5: Pt will ambulate with increased hip extension and arm swing B - improving, see above    Plan: [x] Continue per plan of care [] Alter current plan (see comments)   [] Plan of care initiated [] Hold pending MD visit [x] Discharge (after last PT session on 1/7/2021)      Plan for Next Session:  Aquatic PT    Re-Certification Due Date:         Signature:  Duncan Herrera , PT , DPT [id number]      If you have any questions or concerns, please don't hesitate to call.   Thank you for your referral.    Physician Signature:________________________________Date:__________________  By signing above, therapists plan is approved by physician    Please return by fax to [phone]

## 2021-01-05 NOTE — FLOWSHEET NOTE
Josh  78. and Therapy, [facility], [address] [place] [address], [address] [place] [address] Dr  Phone: [phone]  Fax [phone]        Physical Therapy Aquatic Flow Sheet  Date:  1/5/2021    Patient Name:  Lonny Owusu    Restrictions:  none  Medical/Treatment Diagnosis Information:  · Diagnosis: Lumbar DDD  Insurance/Certification information:  PT Insurance Information: [employer] - no stim, no ionto, 30 visits per year  Physician Information:  Referring Practitioner: Harrison Mcgarry CNP  Plan of care signed (Y/N):  N  Visit# / total visits:  10/10, 7/10            Pain level: 7/10 \"I am feeling a little better\"   Electronically signed by:  Aga Porras, PT, DPT    Medicare Cap (if applicable):  n/a = total amount used, updated 1/5/2021    Key  B= Belt DB= Dumbells T= Theratube   H= Hydrotone N= Noodles W= Weights   P= Paddles S= Speedo equipment K= Kickboard     Exercises/Activities   Warm-up/Amb    Exercises      Slow forward  2 laps  HR/TR  25x    Slow sideways  2 laps  Marches  2 min    Slow backwards  2 laps  Mini-squats  25x    Medium forward    3-way SLR  25x B    Medium sideways    Hip circles/fig 8  25x B    Small shuffle    Hamstring curls  25x B    Jog    Knee extension  25x B    Braiding    Pelvic tilts  12x5\"     Bicycling  2 min  Scap squeezes          Shoulder flex/ext  x20B w/ TA    Functional    Shoulder abd/add  x20B w/ TA    Step  25x B  Shoulder H. abd/add  x20B w/ TA    Lifting    Shoulder IR/ER      Hand to opp knee    Rowing      Push down squat    Bilateral pull down      UE PNF    Push/pull      LE PNF    Push downs      Wall push ups    Arm circles      SLS  2x60\" B  Elbow flex/ext          Chin tuck      Stretching    UT shrugs/rolls      Gastroc/Soleus  2x30\" B  Rocking horse      Hamstring  2x30\" B        SKTC    Other      Piriformis    Tandem 2x60\" B    Hip flexor    Corner Hang x2min.     Ladder pull          Pec stretch Post deltoid           Time In:  2:00pm    Timed Code Treatment Minutes:  25min.     Total Treatment Minutes:  45min. (2 aq individual, 1 aq group)    Treatment/Activity Tolerance:   [x] Patient tolerated treatment well [] Patient limited by fatigue   [] Patient limited by pain [] Patient limited by other medical complications  [] Other:     Prognosis: [] Good [x] Fair  [] Poor    Patient Requires Follow-up:  [x] Yes  [] No    Plan: [x] Continue per plan of care [] Alter current plan (see comments)   [] Plan of care initiated [] Hold pending MD visit [] Discharge    See Weekly Progress Note: [] Yes  [x] No  Next due:

## 2021-01-07 ENCOUNTER — HOSPITAL ENCOUNTER (OUTPATIENT)
Dept: PHYSICAL THERAPY | Age: 51
Setting detail: THERAPIES SERIES
Discharge: HOME | End: 2021-01-07
Payer: COMMERCIAL

## 2021-01-07 PROCEDURE — 97113 AQUATIC THERAPY/EXERCISES: CPT

## 2021-01-07 PROCEDURE — 97150 GROUP THERAPEUTIC PROCEDURES: CPT

## 2021-01-07 NOTE — FLOWSHEET NOTE
[address] [place] [address] [facility], [address] [facility], [address] [place] [address] Dr  Phone: [phone]  Fax [phone]        Physical Therapy Aquatic Flow Sheet  Date:  1/7/2021    Patient Name:  Maureen Cornejo    Restrictions:  none  Medical/Treatment Diagnosis Information:  · Diagnosis: Lumbar DDD  Insurance/Certification information:  PT Insurance Information: [employer] - no stim, no ionto, 30 visits per year  Physician Information:  Referring Practitioner: Pat Baca CNP  Plan of care signed (Y/N):  N  Visit# / total visits:  10/10, 8/10            Pain level: 7/10 \"I am feeling a little better\"   Electronically signed by:  Sunshine Barnes, PT, DPT    Medicare Cap (if applicable):  n/a = total amount used, updated 1/7/2021    Key  B= Belt DB= Dumbells T= Theratube   H= Hydrotone N= Noodles W= Weights   P= Paddles S= Speedo equipment K= Kickboard     Exercises/Activities   Warm-up/Amb    Exercises      Slow forward  2 laps  HR/TR  25x    Slow sideways  2 laps  Marches  2 min    Slow backwards  2 laps  Mini-squats  25x    Medium forward    3-way SLR  25x B    Medium sideways    Hip circles/fig 8  25x B    Small shuffle    Hamstring curls  25x B    Jog    Knee extension  25x B    Braiding    Pelvic tilts  12x5\"     Bicycling  2 min  Scap squeezes          Shoulder flex/ext  x20B w/ TA    Functional    Shoulder abd/add  x20B w/ TA    Step  25x B  Shoulder H. abd/add  x20B w/ TA    Lifting    Shoulder IR/ER      Hand to opp knee    Rowing      Push down squat    Bilateral pull down      UE PNF    Push/pull      LE PNF    Push downs      Wall push ups    Arm circles      SLS  2x60\" B  Elbow flex/ext          Chin tuck      Stretching    UT shrugs/rolls      Gastroc/Soleus  2x30\" B  Rocking horse      Hamstring  2x30\" B        SKTC    Other      Piriformis    Tandem 2x60\" B    Hip flexor    Corner Hang x2min.     Ladder pull          Pec stretch Post deltoid           Time In:  2:00pm    Timed Code Treatment Minutes:  25min.     Total Treatment Minutes:  45min. (2 aq individual, 1 aq group)    Treatment/Activity Tolerance:   [x] Patient tolerated treatment well [] Patient limited by fatigue   [] Patient limited by pain [] Patient limited by other medical complications  [] Other:     Prognosis: [] Good [x] Fair  [] Poor    Patient Requires Follow-up:  [] Yes  [x] No    Plan: [] Continue per plan of care [] Alter current plan (see comments)   [] Plan of care initiated [] Hold pending MD visit [x] Discharge    See Weekly Progress Note: [] Yes  [x] No  Next due:

## 2021-07-22 ENCOUNTER — HOSPITAL ENCOUNTER (OUTPATIENT)
Dept: PHYSICAL THERAPY | Age: 51
Setting detail: THERAPIES SERIES
Discharge: HOME | End: 2021-07-22
Payer: COMMERCIAL

## 2021-07-22 NOTE — PROGRESS NOTES
Physical Therapy    Pt did not show for her physical therapy initial evaluation this date. This is her first no-show at this clinic for this encounter.      oMok Enrique, PT, DPT

## 2021-08-23 ENCOUNTER — INITIAL CONSULT (OUTPATIENT)
Dept: SURGERY | Age: 51
End: 2021-08-23
Payer: COMMERCIAL

## 2021-08-23 VITALS
DIASTOLIC BLOOD PRESSURE: 82 MMHG | BODY MASS INDEX: 42.06 KG/M2 | HEIGHT: 67 IN | WEIGHT: 268 LBS | SYSTOLIC BLOOD PRESSURE: 128 MMHG

## 2021-08-23 DIAGNOSIS — E66.01 MORBID OBESITY WITH BMI OF 40.0-44.9, ADULT (HCC): ICD-10-CM

## 2021-08-23 DIAGNOSIS — R10.31 RIGHT LOWER QUADRANT PAIN: Primary | ICD-10-CM

## 2021-08-23 PROCEDURE — 99203 OFFICE O/P NEW LOW 30 MIN: CPT | Performed by: SURGERY

## 2021-08-23 PROCEDURE — G8417 CALC BMI ABV UP PARAM F/U: HCPCS | Performed by: SURGERY

## 2021-08-23 PROCEDURE — G8427 DOCREV CUR MEDS BY ELIG CLIN: HCPCS | Performed by: SURGERY

## 2021-08-23 PROCEDURE — 1036F TOBACCO NON-USER: CPT | Performed by: SURGERY

## 2021-08-23 PROCEDURE — 3017F COLORECTAL CA SCREEN DOC REV: CPT | Performed by: SURGERY

## 2021-08-23 RX ORDER — LOSARTAN POTASSIUM 50 MG/1
TABLET ORAL
COMMUNITY
Start: 2021-06-25

## 2021-08-23 NOTE — PROGRESS NOTES
New Patient Via Bo Lujan MD    [address], [address] [facility]  [place], [facility]  [phone]    [facility] Carol   YOB: 1970    Date of Visit:  8/23/2021      Vasquez Reynoso MD    Chief Complaint: Abdominal pain    HPI: Patient presents for evaluation of abdominal pain. She was concerned that may be her hernia, that was repaired about 10 years ago, had returned. She states she has discomfort in the right lower quadrant. It is exacerbated by lifting or straining. She does not really feel a bulge. No nausea or vomiting. Her bowels have been working normally    Allergies   Allergen Reactions    Rocephin [Ceftriaxone Sodium In Dextrose] Hives     ITCH    Lisinopril Other (See Comments)     coughing     Outpatient Medications Marked as Taking for the 8/23/21 encounter (Initial consult) with Concepcion Jimenez MD   Medication Sig Dispense Refill    losartan (COZAAR) 50 MG tablet       oxyCODONE-acetaminophen (PERCOCET) 7.5-325 MG per tablet Take 1 tablet by mouth every 6 hours as needed for Pain (back pain).  albuterol sulfate HFA (PROAIR HFA) 108 (90 Base) MCG/ACT inhaler Use 1-2 puffs every 4 hours while awake as needed for cough/wheezing  Dispense with SPACER and Instruct on use. May sub Ventolin or Proventil as needed per [employer]. 1 Inhaler 0    furosemide (LASIX) 40 MG tablet Take 1 tablet by mouth as needed (lower extremity swelling.) 30 tablet 2    citalopram (CELEXA) 40 MG tablet TAKE 1 TABLET BY MOUTH ONCE DAILY 30 tablet 10    ARIPiprazole (ABILIFY) 10 MG tablet Take 5 mg by mouth daily       buPROPion (WELLBUTRIN XL) 300 MG extended release tablet TAKE (1) TABLET BY MOUTH ONCE DAILY 30 tablet 11    XARELTO 20 MG TABS tablet TAKE (1) TABLET BY MOUTH EVERY 24 HOURS.  -PATIENT NEEDS APPOINTMENT FOR FURTHER REFILLS- 30 tablet 10    valACYclovir (VALTREX) 1 g tablet TAKE 2 TABLETS BY MOUTH EVERY 12 HOURS FOR 2 DAYS 8 tablet 1    ondansetron (ZOFRAN ODT) 4 MG disintegrating tablet Take 2 tablets by mouth every 8 hours as needed for Nausea 30 tablet 1    amphetamine-dextroamphetamine (ADDERALL XR) 30 MG extended release capsule Take 30 mg by mouth 2 times daily .          Past Medical History:   Diagnosis Date    ADHD (attention deficit hyperactivity disorder)     Adopted     Blood transfusion     GI BLEEDING-COUMADIN    [facility]     COVID-19 07/16/2020    Degenerative disc disease     Dental disease     Dizziness     DVT (deep venous thrombosis) (HCC)     Fibromyalgia     Headache     Hearing loss     History of blood transfusion     Hx of blood clots 2004    PE and DVT    Hypertension     controlled lately    Hypertrophic cardiomyopathy ([address]     mild, echo 2016    Hypoglycemia 2012    PAST HISTORY    Lupus anticoagulant disorder ([facility]     Narcolepsy     Nosebleed     PE (pulmonary embolism)     Pneumonia     Pulmonary edema 03/2016    Rheumatoid arthritis ([facility]     Sleep apnea     CPAP    TMJ dysfunction      Past Surgical History:   Procedure Laterality Date    CHOLECYSTECTOMY, LAPAROSCOPIC      COLONOSCOPY N/A 9/16/2020    COLONOSCOPY WITH BIOPSY performed by aLlita Steven MD at [address] [place] [address]      bone spur  - rt foot    HYSTEROSCOPY  05/25/2017    D & C    KNEE ARTHROPLASTY Left 08/14/2018    KNEE ARTHROSCOPY Left 2006    MENISCUS    KNEE ARTHROSCOPY Left 09/09/2016    MENISCUS    IN TOTAL KNEE ARTHROPLASTY Left 8/14/2018    LEFT TOTAL KNEE REPLACEMENT WITH PLATELET RICH PLASMA REYES & NEPHEW LEGION OX XLPE performed by Michelle Trinidad MD at [place] [address]  11/29/2017    LAPAROSCOPIC SLEEVE GASTRECTOMY-ETHICON, LIVER BIOPSY    UMBILICAL HERNIA REPAIR  11/05/2012    LAPAROSCOPIC WITH PHYSIO MESH     Family History   Adopted: Yes     Social History     Socioeconomic History    Marital status:      Spouse name: Not on file    Number of children: Not on file    Years of education: Not on file    Highest education level: Not on file   Occupational History    Not on file   Tobacco Use    Smoking status: Never Smoker    Smokeless tobacco: Never Used   Vaping Use    Vaping Use: Never used   Substance and Sexual Activity    Alcohol use: Yes     Comment: rarely    Drug use: No     Comment: on chronic percocet for back pain     Sexual activity: Not Currently   Other Topics Concern    Not on file   Social History Narrative    Not on file     Social Determinants of Health     Financial Resource Strain:     Difficulty of Paying Living Expenses:    Food Insecurity:     Worried About Running Out of Food in the Last Year:     Ran Out of Food in the Last Year:    Transportation Needs:     Lack of Transportation (Medical):  Lack of Transportation (Non-Medical):    Physical Activity:     Days of Exercise per Week:     Minutes of Exercise per Session:    Stress:     Feeling of Stress :    Social Connections:     Frequency of Communication with Friends and Family:     Frequency of Social Gatherings with Friends and Family:     Attends Voodoo Services:     Active Member of Clubs or Organizations:     Attends Club or Organization Meetings:     Marital Status:    Intimate Partner Violence:     Fear of Current or Ex-Partner:     Emotionally Abused:     Physically Abused:     Sexually Abused:           Vitals:    08/23/21 0914   BP: 128/82   Site: Left Upper Arm   Position: Sitting   Cuff Size: Large Adult   Weight: 268 lb (121.6 kg)   Height: 5' 7\" (1.702 m)     Body mass index is 41.97 kg/m².      Wt Readings from Last 3 Encounters:   08/23/21 268 lb (121.6 kg)   10/26/20 270 lb (122.5 kg)   10/19/20 270 lb (122.5 kg)     BP Readings from Last 3 Encounters:   08/23/21 128/82   09/16/20 135/80   07/16/20 (!) 150/71        REVIEW OF SYSTEMS:  CONSTITUTIONAL:  negative  HEENT:  Negative  RESPIRATORY: negative  CARDIOVASCULAR:  negative  GASTROINTESTINAL:  positive for abdominal pain  GENITOURINARY:  negative  HEMATOLOGIC/LYMPHATIC:  negative  ENDOCRINE:  Negative  NEUROLOGICAL:  Negative  * All other ROS reviewed and negative. PE:  Constitutional:  Well developed, well nourished, no acute distress, non-toxic appearance   Eyes:  PERRL, conjunctiva normal   HENT:  Atraumatic, external ears normal, nose normal. Neck- normal range of motion, no tenderness, supple   Respiratory:  No respiratory distress, normal breath sounds, no rales, no wheezing   Cardiovascular:  Normal rate, normal rhythm  GI: Bowel sounds positive, soft, obese. She is mildly tender in the right lower quadrant and right mid abdomen. No hernia defect is palpable, but exam is limited by body habitus. There is no sign of recurrent hernia in her umbilical area  :  No costovertebral angle tenderness   Integument:  Well hydrated, no rash   Lymphatic:  No lymphadenopathy noted   Neurologic:  Alert & oriented x 3, no focal deficits noted   Psychiatric:  Speech and behavior appropriate       DATA:  Radiology Review: CT scan ordered      Assessment:  1. Right lower quadrant pain    2. Morbid obesity with BMI of 40.0-44.9, adult [facility])        Plan: CT scan will be ordered for more definitive evaluation. There is no overt sign of of hernia on exam, however this is limited by her body habitus. Further treatment plan will be based on the results of the CT scan. Body mass index is 41.97 kg/m². , complicating assessment and treatment, placing patient at risk for multiple co-morbidities as well as early death and contributing to the patient's presentation

## 2022-06-11 ENCOUNTER — HOSPITAL ENCOUNTER (EMERGENCY)
Age: 52
Discharge: HOME | End: 2022-06-11
Attending: EMERGENCY MEDICINE
Payer: MEDICARE

## 2022-06-11 VITALS
HEIGHT: 68 IN | DIASTOLIC BLOOD PRESSURE: 92 MMHG | SYSTOLIC BLOOD PRESSURE: 152 MMHG | TEMPERATURE: 97.9 F | BODY MASS INDEX: 40.92 KG/M2 | HEART RATE: 85 BPM | WEIGHT: 270 LBS | RESPIRATION RATE: 16 BRPM | OXYGEN SATURATION: 96 %

## 2022-06-11 DIAGNOSIS — T78.2XXA ANAPHYLAXIS, INITIAL ENCOUNTER: Primary | ICD-10-CM

## 2022-06-11 PROCEDURE — 2580000003 HC RX 258: Performed by: EMERGENCY MEDICINE

## 2022-06-11 PROCEDURE — 6360000002 HC RX W HCPCS: Performed by: EMERGENCY MEDICINE

## 2022-06-11 PROCEDURE — 99284 EMERGENCY DEPT VISIT MOD MDM: CPT

## 2022-06-11 PROCEDURE — 96374 THER/PROPH/DIAG INJ IV PUSH: CPT

## 2022-06-11 PROCEDURE — 96375 TX/PRO/DX INJ NEW DRUG ADDON: CPT

## 2022-06-11 PROCEDURE — 6360000002 HC RX W HCPCS

## 2022-06-11 PROCEDURE — 2500000003 HC RX 250 WO HCPCS: Performed by: EMERGENCY MEDICINE

## 2022-06-11 RX ORDER — DIPHENHYDRAMINE HYDROCHLORIDE 50 MG/ML
25 INJECTION INTRAMUSCULAR; INTRAVENOUS ONCE
Status: COMPLETED | OUTPATIENT
Start: 2022-06-11 | End: 2022-06-11

## 2022-06-11 RX ORDER — METHYLPREDNISOLONE SODIUM SUCCINATE 125 MG/2ML
125 INJECTION, POWDER, LYOPHILIZED, FOR SOLUTION INTRAMUSCULAR; INTRAVENOUS ONCE
Status: COMPLETED | OUTPATIENT
Start: 2022-06-11 | End: 2022-06-11

## 2022-06-11 RX ORDER — EPINEPHRINE 0.3 MG/.3ML
INJECTION SUBCUTANEOUS
Status: COMPLETED
Start: 2022-06-11 | End: 2022-06-11

## 2022-06-11 RX ORDER — EPINEPHRINE 0.3 MG/.3ML
0.3 INJECTION SUBCUTANEOUS
Qty: 1 EACH | Refills: 2 | Status: SHIPPED | OUTPATIENT
Start: 2022-06-11 | End: 2022-06-11

## 2022-06-11 RX ADMIN — METHYLPREDNISOLONE SODIUM SUCCINATE 125 MG: 125 INJECTION, POWDER, FOR SOLUTION INTRAMUSCULAR; INTRAVENOUS at 04:52

## 2022-06-11 RX ADMIN — EPINEPHRINE 0.3 MG: 0.3 INJECTION INTRAMUSCULAR at 04:46

## 2022-06-11 RX ADMIN — SODIUM CHLORIDE, PRESERVATIVE FREE 20 MG: 5 INJECTION INTRAVENOUS at 04:55

## 2022-06-11 RX ADMIN — DIPHENHYDRAMINE HYDROCHLORIDE 25 MG: 50 INJECTION, SOLUTION INTRAMUSCULAR; INTRAVENOUS at 04:53

## 2022-06-11 ASSESSMENT — ENCOUNTER SYMPTOMS
EYE PAIN: 0
WHEEZING: 0
COUGH: 0
VOMITING: 0
DIARRHEA: 0
SHORTNESS OF BREATH: 0
NAUSEA: 0
ABDOMINAL PAIN: 0

## 2022-06-11 ASSESSMENT — PAIN - FUNCTIONAL ASSESSMENT
PAIN_FUNCTIONAL_ASSESSMENT: NONE - DENIES PAIN
PAIN_FUNCTIONAL_ASSESSMENT: 0-10
PAIN_FUNCTIONAL_ASSESSMENT: NONE - DENIES PAIN

## 2022-06-11 ASSESSMENT — PAIN DESCRIPTION - DESCRIPTORS: DESCRIPTORS: ITCHING

## 2022-06-11 ASSESSMENT — PAIN DESCRIPTION - ORIENTATION: ORIENTATION: ANTERIOR;LEFT;RIGHT

## 2022-06-11 ASSESSMENT — PAIN DESCRIPTION - FREQUENCY: FREQUENCY: CONTINUOUS

## 2022-06-11 ASSESSMENT — PAIN DESCRIPTION - ONSET: ONSET: SUDDEN

## 2022-06-11 NOTE — ED PROVIDER NOTES
[address] [facility] [address] ENCOUNTER          ATTENDING PHYSICIAN NOTE       Date of evaluation: 6/11/2022    ADDENDUM:      Care of this patient was assumed from Dr. Sahara Alcantar. The patient was seen for Allergic Reaction (??? cause, ate veggie burger earlier at [place], fell asleep in chair, awoke and emesis x one and itching all over torso, arms, hands and feet, )  . The patient's initial evaluation and plan have been discussed with the prior provider who initially evaluated the patient. Please see the original HPI and MDM for full details. Nursing Notes, Past Medical Hx, Past Surgical Hx, Social Hx, Allergies, and Family Hx were all reviewed. Diagnostic Results       RADIOLOGY:  No orders to display       LABS:   No results found for this visit on 06/11/22. RECENT VITALS:  BP: (!) 152/92, Temp: 97.9 °F (36.6 °C), Heart Rate: 85, Resp: 16, SpO2: 96 %     Procedures   Procedures    ED Course     The patient was given the following medications:  Orders Placed This Encounter   Medications    EPINEPHrine (EPIPEN) 0.3 MG/0.3ML injection     [place]: amanda override    EPINEPHrine 1 MG/ML injection 0.3 mg    diphenhydrAMINE (BENADRYL) injection 25 mg    famotidine (PEPCID) 20 mg in sodium chloride (PF) 10 mL injection    methylPREDNISolone sodium (SOLU-MEDROL) injection 125 mg    EPINEPHrine (EPIPEN 2-NAKITA) 0.3 MG/0.3ML SOAJ injection     Sig: Inject 0.3 mLs into the muscle once as needed (allergic reaction, anaphylaxis) Use as directed for allergic reaction     Dispense:  1 each     Refill:  2       CONSULTS:  None    MEDICAL DECISION MAKING / ASSESSMENT / Bearl Radha is a 47 y.o. female who presented for allergic reaction. Please see the original HPI and MDM for full details. At time of signout, patient was pending ongoing monitoring after epinephrine administration.   She was monitored for greater than 4 hours after epinephrine administration and had no worsening/rebound of symptoms. She feels comfortable with discharge at this time. EpiPen prescription provided. I personally discussed indications and method of use of the EpiPen with the patient. Recommended follow-up with her PCP for possible referral for allergy testing. Discharged in stable condition with written and verbal instructions for which to return to the ED. Clinical Impression     1.  Anaphylaxis, initial encounter        Disposition     PATIENT REFERRED TO:  [facility] Emergency Department  [address]  [phone]    If symptoms worsen    Marc Rogers MD  [address] [place] [address]  [phone]    Schedule an appointment as soon as possible for a visit in 1 week  For reexamination, to discuss additional allergy testing      DISCHARGE MEDICATIONS:  Discharge Medication List as of 6/11/2022  8:37 AM      START taking these medications    Details   EPINEPHrine (EPIPEN 2-NAKITA) 0.3 MG/0.3ML SOAJ injection Inject 0.3 mLs into the muscle once as needed (allergic reaction, anaphylaxis) Use as directed for allergic reaction, Disp-1 each, R-2Print             DISPOSITION Decision To Discharge 06/11/2022 07:59:17 AM       Jamaal Soni MD  06/11/22 6347

## 2022-06-11 NOTE — ED NOTES
Patient prepared for and ready to be discharged. Patient discharged at this time to home in care of self in no acute distress after verbalizing understanding of discharge instructions. Patient left after receiving After Visit Summary instructions. IV removed prior to discharge.        Matilda Abraham RN  06/11/22 8240

## 2022-06-11 NOTE — ED PROVIDER NOTES
[address] [facility]          ATTENDING PHYSICIAN NOTE       Date of evaluation: 6/11/2022    Chief Complaint     Allergic Reaction (??? cause, ate veggie burger earlier at [place], fell asleep in chair, awoke and emesis x one and itching all over torso, arms, hands and feet, )      History of Present Illness     Viva Peabody is a 47 y.o. female who presents with chief complaint of allergic reaction. Patient woke this evening with diffuse itching all over and hives. Had 1 episode of nausea with emesis but that is now resolved. No lip swelling or tongue swelling but does have scratchy sensation in the back of her throat. No difficulty breathing. No chest pain. No lightheadedness. Unclear what might have been the inciting factor and states that her symptoms feel similar to when she was given ceftriaxone in the hospital previously. The only possible exposure was that she ate a veggie burger at midnight. Does not have any known food allergies. Review of Systems     Review of Systems   Constitutional: Negative for chills and fever. HENT: Negative for congestion. Eyes: Negative for pain. Respiratory: Negative for cough, shortness of breath and wheezing. Cardiovascular: Negative for chest pain and leg swelling. Gastrointestinal: Negative for abdominal pain, diarrhea, nausea and vomiting. Genitourinary: Negative for dysuria. Musculoskeletal: Negative for arthralgias. Skin: Negative for rash. Neurological: Negative for weakness and headaches. All other systems reviewed and are negative.       Past Medical, Surgical, Family, and Social History         Diagnosis Date    ADHD (attention deficit hyperactivity disorder)     Adopted     Blood transfusion     GI BLEEDING-COUMADIN    [facility]     COVID-19 07/16/2020    Degenerative disc disease     Dental disease     Dizziness     D[facility] thrombosis) ([facility]     Fibromyalgia  Headache     Hearing loss     History of blood transfusion     Hx of blood clots 2004    PE and DVT    Hypertension     controlled lately    Hypertrophic cardiomyopathy ([facility]     mild, echo 2016    Hypoglycemia 2012    PAST HISTORY    Lupus anticoagulant disorder ([facility]     Narcolepsy     Nosebleed     PE (pulmonary embolism)     Pneumonia     Pulmonary edema 03/2016    Rheumatoid arthritis ([facility]     Sleep apnea     CPAP    TMJ dysfunction          Procedure Laterality Date    CHOLECYSTECTOMY, LAPAROSCOPIC      COLONOSCOPY N/A 9/16/2020    COLONOSCOPY WITH BIOPSY performed by Logan Lanier MD at [place] [address]      bone spur  - rt foot    HYSTEROSCOPY  05/25/2017    D & C    KNEE ARTHROPLASTY Left 08/14/2018    KNEE ARTHROSCOPY Left 2006    MENISCUS    KNEE ARTHROSCOPY Left 09/09/2016    MENISCUS    ME TOTAL KNEE ARTHROPLASTY Left 8/14/2018    LEFT TOTAL KNEE REPLACEMENT WITH PLATELET RICH PLASMA REYES & NEPH LEGION OX XLPE performed by Sushma Ling MD at [place] [address]  11/29/2017    LAPAROSCOPIC SLEEVE [address], LIVER BIOPSY    UMBILICAL HERNIA REPAIR  11/05/2012    LAPAROSCOPIC WITH PHYSIO MESH     Her family history is not on file. She was adopted. She reports that she has never smoked. She has never used smokeless tobacco. She reports current alcohol use. She reports that she does not use drugs. Medications     Previous Medications    ALBUTEROL SULFATE HFA (PROAIR HFA) 108 (90 BASE) MCG/ACT INHALER    Use 1-2 puffs every 4 hours while awake as needed for cough/wheezing  Dispense with SPACER and Instruct on use. May sub Ventolin or Proventil as needed per [employer]. AMPHETAMINE-DEXTROAMPHETAMINE (ADDERALL XR) 30 MG EXTENDED RELEASE CAPSULE    Take 30 mg by mouth 2 times daily .     ARIPIPRAZOLE (ABILIFY) 10 MG TABLET    Take 5 mg by mouth daily     BACITRACIN 500 UNIT/GM OINTMENT    Apply topically    BUPROPION [facility] XL) 300 MG EXTENDED RELEASE TABLET    TAKE (1) TABLET BY MOUTH ONCE DAILY    CITALOPRAM (CELEXA) 40 MG TABLET    TAKE 1 TABLET BY MOUTH ONCE DAILY    DEXTROAMPHETAMINE (DEXTROSTAT) 10 MG TABLET    Take 10 mg by mouth 2 times daily . FUROSEMIDE (LASIX) 40 MG TABLET    Take 1 tablet by mouth as needed (lower extremity swelling.)    LOSARTAN (COZAAR) 50 MG TABLET        ONDANSETRON (ZOFRAN ODT) 4 MG DISINTEGRATING TABLET    Take 2 tablets by mouth every 8 hours as needed for Nausea    OXYCODONE-ACETAMINOPHEN (PERCOCET) 7.5-325 MG PER TABLET    Take 1 tablet by mouth every 6 hours as needed for Pain (back pain). VALACYCLOVIR (VALTREX) 1 G TABLET    TAKE 2 TABLETS BY MOUTH EVERY 12 HOURS FOR 2 DAYS    XARELTO 20 MG TABS TABLET    TAKE (1) TABLET BY MOUTH EVERY 24 HOURS. -PATIENT NEEDS APPOINTMENT FOR FURTHER REFILLS-       Allergies     She is allergic to rocephin [ceftriaxone sodium in dextrose] and lisinopril. Physical Exam     ED Triage Vitals [06/11/22 0423]   Enc Vitals Group      BP (!) 166/92      Heart Rate (!) 104      Resp 20      Temp 97.9 °F (36.6 °C)      Temp Source Oral      SpO2 98 %      Weight 270 lb (122.5 kg)      Height 5' 8\" (1.727 m)      Head Circumference       Peak Flow       Pain Score       Pain Loc       Pain Edu? Excl. in [address]? General: Mildly distressed    HEENT:  NCAT, no oropharyngeal edema, no lingual edema, posterior oropharynx is widely patent with no swelling or trismus    Neck:  Supple, no stridor    Pulmonary:   No increased work of breathing; CTAB    Cardiac: Mildly tachycardic with a regular rhythm, no murmur, thrill or gallop. Capillary refill <3s.  2+ distal pulses    Abdomen:  Soft, nontender, nondistended; no focal rebound or guarding    Musculoskeletal:  Grossly intact without obvious injury or deformity    Neuro: Awake alert and oriented x4    Skin: Diffuse hives and urticaria worse on the hands and forearms as well as the forehead      Diagnostic Results RADIOLOGY:  No orders to display       LABS:   No results found for this visit on 06/11/22. RECENT VITALS:  BP: (!) 148/86, Temp: 97.9 °F (36.6 °C), Heart Rate: 69, Resp: 17, SpO2: 96 %     Procedures         ED Course     Nursing Notes, Past Medical Hx, Past Surgical Hx, Social Hx, Allergies, and Family Hx were reviewed. The patient was given the following medications:  Orders Placed This Encounter   Medications    EPINEPHrine (EPIPEN) 0.3 MG/0.3ML injection     Cheryle Roach, Lorie: cabinet override    EPINEPHrine 1 MG/ML injection 0.3 mg    diphenhydrAMINE (BENADRYL) injection 25 mg    famotidine (PEPCID) 20 mg in sodium chloride (PF) 10 mL injection    methylPREDNISolone sodium (SOLU-MEDROL) injection 125 mg       CONSULTS:  None    MEDICAL DECISION MAKING / ASSESSMENT / Mela Gibson is a 47 y.o. female presents with allergic reaction and given the overall severity plus the nausea with emesis am concerned for mild early anaphylaxis. Epinephrine intramuscular was administered and she had near complete resolution of her symptoms. Also given diphenhydramine, famotidine and methylprednisolone IV. This completely resolved her symptoms. She will be observed here in the emergency department for 4 to 6 hours to ensure that there is no recurrence of her symptoms and that she should not need to be dosing of epinephrine. Should she continue to be symptom free at the end of her period of observation that I feel she be discharged home with a home EpiPen and return precautions. She is turned over to Dr. Delmer Lawson to complete the period of observation and reevaluate the patient for appropriateness for discharge at that time. 4 hours of observation will be at approximately 0800. Critical Care:  Due to the immediate potential for life-threatening deterioration due to anaphylaxis, I spent 32 minutes providing critical care.   This time excludes time spent performing procedures but includes time spent on direct patient care, history retrieval, review of the chart, and discussions with patient, family, and consultant(s). Clinical Impression     1. Anaphylaxis, initial encounter        Disposition     PATIENT REFERRED TO:  No follow-up provider specified.     DISCHARGE MEDICATIONS:  New Prescriptions    No medications on file       [address] Kenia Byers MD  06/11/22 Duke Arreguin MD  06/11/22 2363

## 2022-06-30 ENCOUNTER — TELEPHONE (OUTPATIENT)
Dept: SURGERY | Age: 52
End: 2022-06-30

## 2022-09-09 ENCOUNTER — TELEPHONE (OUTPATIENT)
Dept: INTERNAL MEDICINE CLINIC | Age: 52
End: 2022-09-09

## 2022-09-09 NOTE — TELEPHONE ENCOUNTER
----- Message from Ni [facility] sent at 9/9/2022 10:07 AM EDT -----  Subject: Appointment Request    Reason for Call: New Patient/New to Provider Appointment needed: New   Patient Request Appointment    QUESTIONS    Reason for appointment request? No appointments available during search     Additional Information for Provider? Patient is looking for a PCP and is   requesting [facility]. She is ok with waiting a month or two for   [place] because there is no immediate need. Eventually she will need med   refills so she's hoping to get established before that.  Please call   patient back to schedule  ---------------------------------------------------------------------------  --------------  Vijaya Stokesing INFO  [phone]; OK to leave message on voicemail  ---------------------------------------------------------------------------  --------------  SCRIPT ANSWERS  COVID Screen: Partneredoter

## 2023-05-10 ENCOUNTER — HOSPITAL ENCOUNTER (OUTPATIENT)
Dept: PHYSICAL THERAPY | Age: 53
Setting detail: THERAPIES SERIES
Discharge: HOME | End: 2023-05-10

## 2023-05-10 ENCOUNTER — APPOINTMENT (OUTPATIENT)
Dept: PHYSICAL THERAPY | Age: 53
End: 2023-05-10
Payer: MEDICARE

## 2023-05-10 ENCOUNTER — OFFICE VISIT (OUTPATIENT)
Dept: PRIMARY CARE CLINIC | Age: 53
End: 2023-05-10
Payer: MEDICARE

## 2023-05-10 VITALS
TEMPERATURE: 97.2 F | HEIGHT: 66 IN | WEIGHT: 293 LBS | BODY MASS INDEX: 47.09 KG/M2 | DIASTOLIC BLOOD PRESSURE: 80 MMHG | OXYGEN SATURATION: 98 % | HEART RATE: 77 BPM | SYSTOLIC BLOOD PRESSURE: 130 MMHG

## 2023-05-10 DIAGNOSIS — D68.62 LUPUS ANTICOAGULANT DISORDER (HCC): ICD-10-CM

## 2023-05-10 DIAGNOSIS — J02.9 SORE THROAT: ICD-10-CM

## 2023-05-10 DIAGNOSIS — Z12.31 ENCOUNTER FOR SCREENING MAMMOGRAM FOR MALIGNANT NEOPLASM OF BREAST: ICD-10-CM

## 2023-05-10 DIAGNOSIS — I50.31 ACUTE DIASTOLIC CONGESTIVE HEART FAILURE (HCC): Primary | ICD-10-CM

## 2023-05-10 DIAGNOSIS — F33.1 MODERATE EPISODE OF RECURRENT MAJOR DEPRESSIVE DISORDER (HCC): ICD-10-CM

## 2023-05-10 PROCEDURE — 1036F TOBACCO NON-USER: CPT | Performed by: NURSE PRACTITIONER

## 2023-05-10 PROCEDURE — 3078F DIAST BP <80 MM HG: CPT | Performed by: NURSE PRACTITIONER

## 2023-05-10 PROCEDURE — G8417 CALC BMI ABV UP PARAM F/U: HCPCS | Performed by: NURSE PRACTITIONER

## 2023-05-10 PROCEDURE — 3017F COLORECTAL CA SCREEN DOC REV: CPT | Performed by: NURSE PRACTITIONER

## 2023-05-10 PROCEDURE — 99204 OFFICE O/P NEW MOD 45 MIN: CPT | Performed by: NURSE PRACTITIONER

## 2023-05-10 PROCEDURE — G8427 DOCREV CUR MEDS BY ELIG CLIN: HCPCS | Performed by: NURSE PRACTITIONER

## 2023-05-10 PROCEDURE — 3074F SYST BP LT 130 MM HG: CPT | Performed by: NURSE PRACTITIONER

## 2023-05-10 RX ORDER — AMOXICILLIN AND CLAVULANATE POTASSIUM 875; 125 MG/1; MG/1
1 TABLET, FILM COATED ORAL 2 TIMES DAILY
Qty: 20 TABLET | Refills: 0 | Status: SHIPPED | OUTPATIENT
Start: 2023-05-10 | End: 2023-05-20

## 2023-05-10 RX ORDER — DEXTROAMPHETAMINE SACCHARATE, AMPHETAMINE ASPARTATE, DEXTROAMPHETAMINE SULFATE AND AMPHETAMINE SULFATE 2.5; 2.5; 2.5; 2.5 MG/1; MG/1; MG/1; MG/1
10 TABLET ORAL DAILY
COMMUNITY

## 2023-05-10 RX ORDER — TIZANIDINE 4 MG/1
TABLET ORAL
COMMUNITY
Start: 2023-03-21

## 2023-05-10 RX ORDER — VENLAFAXINE HYDROCHLORIDE 150 MG/1
CAPSULE, EXTENDED RELEASE ORAL
COMMUNITY
Start: 2023-04-17

## 2023-05-10 SDOH — ECONOMIC STABILITY: FOOD INSECURITY: WITHIN THE PAST 12 MONTHS, YOU WORRIED THAT YOUR FOOD WOULD RUN OUT BEFORE YOU GOT MONEY TO BUY MORE.: NEVER TRUE

## 2023-05-10 SDOH — ECONOMIC STABILITY: FOOD INSECURITY: WITHIN THE PAST 12 MONTHS, THE FOOD YOU BOUGHT JUST DIDN'T LAST AND YOU DIDN'T HAVE MONEY TO GET MORE.: NEVER TRUE

## 2023-05-10 SDOH — ECONOMIC STABILITY: HOUSING INSECURITY
IN THE LAST 12 MONTHS, WAS THERE A TIME WHEN YOU DID NOT HAVE A STEADY PLACE TO SLEEP OR SLEPT IN A SHELTER (INCLUDING NOW)?: NO

## 2023-05-10 SDOH — ECONOMIC STABILITY: INCOME INSECURITY: HOW HARD IS IT FOR YOU TO PAY FOR THE VERY BASICS LIKE FOOD, HOUSING, MEDICAL CARE, AND HEATING?: HARD

## 2023-05-10 ASSESSMENT — ENCOUNTER SYMPTOMS
COUGH: 0
SORE THROAT: 1
SHORTNESS OF BREATH: 0

## 2023-05-10 ASSESSMENT — PATIENT HEALTH QUESTIONNAIRE - PHQ9
4. FEELING TIRED OR HAVING LITTLE ENERGY: 3
SUM OF ALL RESPONSES TO PHQ QUESTIONS 1-9: 16
5. POOR APPETITE OR OVEREATING: 0
SUM OF ALL RESPONSES TO PHQ QUESTIONS 1-9: 16
9. THOUGHTS THAT YOU WOULD BE BETTER OFF DEAD, OR OF HURTING YOURSELF: 0
3. TROUBLE FALLING OR STAYING ASLEEP: 1
SUM OF ALL RESPONSES TO PHQ9 QUESTIONS 1 & 2: 6
8. MOVING OR SPEAKING SO SLOWLY THAT OTHER PEOPLE COULD HAVE NOTICED. OR THE OPPOSITE, BEING SO FIGETY OR RESTLESS THAT YOU HAVE BEEN MOVING AROUND A LOT MORE THAN USUAL: 0
6. FEELING BAD ABOUT YOURSELF - OR THAT YOU ARE A FAILURE OR HAVE LET YOURSELF OR YOUR FAMILY DOWN: 3
SUM OF ALL RESPONSES TO PHQ QUESTIONS 1-9: 16
1. LITTLE INTEREST OR PLEASURE IN DOING THINGS: 3
2. FEELING DOWN, DEPRESSED OR HOPELESS: 3
10. IF YOU CHECKED OFF ANY PROBLEMS, HOW DIFFICULT HAVE THESE PROBLEMS MADE IT FOR YOU TO DO YOUR WORK, TAKE CARE OF THINGS AT HOME, OR GET ALONG WITH OTHER PEOPLE: 3
7. TROUBLE CONCENTRATING ON THINGS, SUCH AS READING THE NEWSPAPER OR WATCHING TELEVISION: 3
SUM OF ALL RESPONSES TO PHQ QUESTIONS 1-9: 16

## 2023-05-10 NOTE — PROGRESS NOTES
PROGRESS NOTE  Date of Service:  5/10/2023  Address: [place] [address] [facility]  [address] [place] [address] [address] [facility]  Dept: [phone]  Loc: [phone]    Subjective:      Patient ID: [id number]  Analy Zamora is a 46 y.o. female    HPI: patient is here to establish care, was [facility]. Patient sees pulmonologist for her narcolepsy. Patient was seeing [facility] for lupus anticoagulation. She is in on xerelto. Patient is on disability patient has 3 kids. Patient takes care of her dad. Patient has been having body aches and sore throat. Patient said that her throat hurts too, she did test positive for flu. She said she did stay in the house the first few days, patient has flu a. Patient said that she did have covid and strep. Patient said head hurts on the back. Patient said that she started with symptoms on 5/1. Patient said she still having sore throat. Patient sees a psychiatrist brandon hancock.     Review of Systems   Constitutional:  Positive for fatigue. Negative for chills and fever. HENT:  Positive for sore throat. Respiratory:  Negative for cough and shortness of breath. Musculoskeletal:  Positive for myalgias. Neurological:  Positive for headaches. All other systems reviewed and are negative. Objective:   Physical Exam  Vitals reviewed. Constitutional:       Appearance: Normal appearance. She is well-developed. HENT:      Head: Normocephalic and atraumatic. Right Ear: Ear canal and external ear normal. A middle ear effusion is present. Tympanic membrane is retracted. Left Ear: Ear canal and external ear normal. A middle ear effusion is present. Tympanic membrane is retracted. Nose: Nose normal.      Mouth/Throat:      Pharynx: Uvula midline. Posterior oropharyngeal erythema present. No oropharyngeal exudate. Tonsils: Tonsillar exudate present.    Cardiovascular:      Rate and Rhythm: Normal rate

## 2023-05-10 NOTE — FLOWSHEET NOTE
[place] [address] [facility],  [address]        Physical Therapy  Cancellation/No-show Note  Patient Name:  Nichelle Mercedes  :  1970   Date:  5/10/2023  Cancelled visits to date: 1  No-shows to date: 0    For today's appointment patient:  [x]  Cancelled eval   []  Rescheduled appointment  []  No-show     Reason given by patient:  []  Patient ill  []  Conflicting appointment  []  No transportation    []  Conflict with work  []  No reason given  [x]  Other:  unable to get back from [place] in time for appt    Comments:      Electronically signed by:  Vince Gonzalez PT

## 2023-05-11 ENCOUNTER — APPOINTMENT (OUTPATIENT)
Dept: PHYSICAL THERAPY | Age: 53
End: 2023-05-11
Payer: MEDICARE

## 2023-05-16 ENCOUNTER — APPOINTMENT (OUTPATIENT)
Dept: PHYSICAL THERAPY | Age: 53
End: 2023-05-16
Payer: MEDICARE

## 2023-05-16 ENCOUNTER — TELEPHONE (OUTPATIENT)
Dept: PRIMARY CARE CLINIC | Age: 53
End: 2023-05-16

## 2023-05-16 NOTE — TELEPHONE ENCOUNTER
This is not due to medication, I would recommend gargling with salt water, this medication will treat most infections in the throat.

## 2023-05-16 NOTE — TELEPHONE ENCOUNTER
Patient called into office stating she was seen on 5/10/23 for sore throat and was prescribed amoxicillin-clavulanate (AUGMENTIN) 875-125 MG per tablet. Patient stated she has only taken two tablets worth of medication so far, however her sore throat has increased in pain, along with a headache. Patient is wondering if this is normal or if she should be seen.

## 2023-05-18 ENCOUNTER — APPOINTMENT (OUTPATIENT)
Dept: PHYSICAL THERAPY | Age: 53
End: 2023-05-18
Payer: MEDICARE

## 2023-05-22 ENCOUNTER — HOSPITAL ENCOUNTER (OUTPATIENT)
Dept: PHYSICAL THERAPY | Age: 53
Setting detail: THERAPIES SERIES
Discharge: HOME | End: 2023-05-22
Payer: MEDICARE

## 2023-05-22 PROCEDURE — 97110 THERAPEUTIC EXERCISES: CPT | Performed by: PHYSICAL THERAPIST

## 2023-05-22 PROCEDURE — 97161 PT EVAL LOW COMPLEX 20 MIN: CPT | Performed by: PHYSICAL THERAPIST

## 2023-05-22 NOTE — PLAN OF CARE
[facility], [facility], [address] [facility], [address] [place] [address] Dr  Phone: [phone]  Fax [phone]       Physical Therapy Certification    Dear  Luciano Britton CNP,    We had the pleasure of evaluating the following patient for physical therapy services at [address] [place] [address]. A summary of our findings can be found in the initial assessment below. This includes our plan of care. If you have any questions or concerns regarding these findings, please do not hesitate to contact me at the office phone number checked above. Thank you for the referral.       Physician Signature:_______________________________Date:__________________  By signing above (or electronic signature), therapists plan is approved by physician    Patient: Hilario Montoya   : 1970   MRN: [id number]  Referring Physician: FEROZ Stock*      Evaluation Date: 2023      Medical Diagnosis Information:  Chronic pain syndrome [G89.4]   LBP M54.3                                         Insurance information:  medicare        Precautions/ Contra-indications: narcolepsy, ADHD, OA, RA, blood clotting disorder ; depression, CHF    C-SSRS Triggered by Intake questionnaire (Past 2 wk assessment):   [x] No, Questionnaire did not trigger screening.   [] Yes, Patient intake triggered further evaluation      [] C-SSRS Screening completed  [] PCP notified via Plan of Care  [] Emergency services notified     Latex Allergy:  [x]NO      []YES  Preferred Language for Healthcare:   [x]English       []other:    SUBJECTIVE: Patient stated complaint: pt reports she has pain in her LB and R hip and goes down her leg. Pt reports she had an MRI which showed DDD. Pt reports she has been to chiropractor, massage therapy, and aquatic therapy in the past. She lost 100 lbs but has gained 40lbs back.  Pt reports the aquatic therapy helped her in the past and she would like to

## 2023-05-22 NOTE — FLOWSHEET NOTE
[facility]  [facility], [facility], [address] [place] [address]  Phone: [phone]  Fax [phone]     Physical Therapy Treatment Note/ Progress Report:           Date:  2023    Patient Name:  Juan Francisco Preciado    :  1970  MRN: [id number]  Restrictions/Precautions:    Medical/Treatment Diagnosis Information:  Chronic pain syndrome [G89.4]  LBP M 61.2   Insurance/Certification information:   medicare   Physician Information:  FEROZ Hernandez*  Has the plan of care been signed (Y/N):        []  Yes  [x]  No     Date of Patient follow up with Physician: in 2.5 weeks       Is this a Progress Report:     []  Yes  [x]  No        If Yes:  Date Range for reporting period:  Beginning 23  Ending     Progress report will be due (10 Rx or 30 days whichever is less):        Recertification will be due (POC Duration  / 90 days whichever is less): 8 weeks         Visit # Insurance Allowable Auth Required   In-person 1 BMN []  Yes []  No    Telehealth   []  Yes []  No    Total            Functional Scale: LEFS      Date assessed:  23      Therapy Diagnosis/Practice Pattern: 4F       Number of Comorbidities:  []0     []1-2    [x]3+    Latex Allergy:  [x]NO      []YES  Preferred Language for Healthcare:   [x]English       []other:      Pain level:  8/10 LB and RLE     SUBJECTIVE:  See eval; pt arrived 10 min late for appt     OBJECTIVE: See eval  Observation:   Test measurements:      RESTRICTIONS/PRECAUTIONS: HTN, L TKR, OA, RA ; lumbar DDD, depression, obesity     Exercises/Interventions:     Therapeutic Ex (98833) Sets/sec Reps Notes/CUES   Supine TrA with hip add  5\"  X10  Hep    Supine LTR  5\" X10  Hep    Edu diagnosis  5 min      Supine knee to chest  15\" x3 B      Supine hip abd with TB and TrA  GTB 5\" x10   Hep                                              Manual Intervention [phone])

## 2023-05-22 NOTE — FLOWSHEET NOTE
Physical Therapy Aquatic Flow Sheet  Date:  5/22/2023    Patient Name:  Yash Patterson    Restrictions:    Diagnosis:  Chronic pain syndrome [G89.4]  Treatment Diagnosis:    Insurance/Certification information:    Plan of care signed (Y/N):    Visit# / total visits:  /  Pain level: /10   Electronically signed by:  Christine Silva PT, PT    Medicare Cap (if applicable):   = total amount used, updated 5/22/2023    Key  B= Belt DB= Dumbells T= Theratube   H= Hydrotone N= Noodles W= Weights   P= Paddles S= Speedo equipment K= Kickboard     Exercises/Activities   Warm-up/Amb    Exercises      Slow forward     HR/TR      Slow sideways    Marches      Slow backwards    Mini-squats      Medium forward    4-way SLR      Medium sideways    Hip circles/fig 8      Small shuffle    Hamstring curls      Jog    Knee extension      Braiding    Pelvic tilts      Bicycling    Scap squeezes          Shoulder flex/ext      Functional    Shoulder abd/add      Step    Shoulder H. abd/add      Lifting    Shoulder IR/ER      Hand to opp knee    Rowing      Push down squat    Bilateral pull down      UE PNF    Push/pull      LE PNF    Push downs      Wall push ups    Arm circles      SLS    Elbow flex/ext          Chin tuck      Stretching    UT shrugs/rolls      Gastroc/Soleus    Rocking horse      Hamstring          SKTC    Other      Piriformis          Hip flexor          Ladder pull          Pec stretch          Post deltoid         Focus on hip and core strengthening and hip stretching   Time In:      Timed Code Treatment Minutes:       Total Treatment Minutes:      Treatment/Activity Tolerance:   [] Patient tolerated treatment well [] Patient limited by fatigue   [] Patient limited by pain [] Patient limited by other medical complications  [] Other:     Prognosis: [] Good [] Fair  [] Poor    Patient Requires Follow-up:  [] Yes  [] No    Plan: [] Continue per plan of care [] Alter current plan (see comments)   [] Plan of care

## 2023-05-23 ENCOUNTER — HOSPITAL ENCOUNTER (OUTPATIENT)
Dept: PHYSICAL THERAPY | Age: 53
Setting detail: THERAPIES SERIES
Discharge: HOME | End: 2023-05-23
Payer: MEDICARE

## 2023-05-23 PROCEDURE — 97150 GROUP THERAPEUTIC PROCEDURES: CPT

## 2023-05-23 PROCEDURE — 97113 AQUATIC THERAPY/EXERCISES: CPT

## 2023-05-25 ENCOUNTER — APPOINTMENT (OUTPATIENT)
Dept: PHYSICAL THERAPY | Age: 53
End: 2023-05-25
Payer: MEDICARE

## 2023-05-30 ENCOUNTER — HOSPITAL ENCOUNTER (OUTPATIENT)
Dept: PHYSICAL THERAPY | Age: 53
Setting detail: THERAPIES SERIES
Discharge: HOME | End: 2023-05-30
Payer: MEDICARE

## 2023-05-30 NOTE — PROGRESS NOTES
Physical Therapy  Cancellation/No-show Note  Patient Name:  Tanja Primrose  :  1970   Date:  2023  Cancels to date: 0  No-shows to date: 1    For today's appointment patient:  [] Cancelled  [] Rescheduled appointment  [x] No-show     Reason given by patient:  [] Patient ill  [] Conflicting appointment  [] No transportation    [] Conflict with work  [] No reason given  [] Other:     Comments:      Electronically signed by:  Marlyn Quintana PT

## 2023-06-01 ENCOUNTER — HOSPITAL ENCOUNTER (OUTPATIENT)
Dept: PHYSICAL THERAPY | Age: 53
Setting detail: THERAPIES SERIES
Discharge: HOME | End: 2023-06-01
Payer: MEDICARE

## 2023-06-01 PROCEDURE — 97113 AQUATIC THERAPY/EXERCISES: CPT

## 2023-06-01 PROCEDURE — 97150 GROUP THERAPEUTIC PROCEDURES: CPT

## 2023-06-01 NOTE — FLOWSHEET NOTE
[facility] [facility], [facility], [address] [place] [address] Dr  Phone: [phone]  Fax [phone]      Physical Therapy Aquatic Flow Sheet  Date:  6/1/2023    Patient Name:  Analy Zamora    Restrictions:    Diagnosis:  Chronic pain syndrome [G89.4]  Treatment Diagnosis:    Insurance/Certification information:  medicare and medicaid  Plan of care signed (Y/N):  sent  Visit# / total visits:  2/16  Pain level: 7/10   Electronically signed by:  Susan Cevallos, PT, DPT    Medicare Cap (if applicable):  623 = total amount used, updated 6/1/2023    Key  B= Belt DB= Dumbells T= Theratube   H= Hydrotone N= Noodles W= Weights   P= Paddles S= Speedo equipment K= Kickboard     Exercises/Activities   Warm-up/Amb    Exercises      Slow forward  2 laps  HR/TR  15x    Slow sideways  2 laps  Marches  2 min    Slow backwards  2 laps  Mini-squats  15x    Medium forward    4-way SLR  15x B    Medium sideways    Hip circles/fig 8  15x B    Small shuffle    Hamstring curls  15x B    Jog    Knee extension  15x B    Braiding    Pelvic tilts  10x5\"    Bicycling  2 min  Scap squeezes  10x5\"        Shoulder flex/ext  15x B    Functional    Shoulder abd/add  15x B    Step  x10B  Shoulder H. abd/add  15x B    Lifting    Shoulder IR/ER      Hand to opp knee    Rowing      Push down squat    Bilateral pull down      UE PNF    Push/pull      LE PNF    Push downs      Wall push ups    Arm circles      SLS  nv  Elbow flex/ext          Chin tuck      Stretching    UT shrugs/rolls      Gastroc/Soleus  2x20\" B  Rocking horse      Hamstring  2x20\" B        SKTC  nv  Other      Piriformis    Tandem stance  2x30\" B    Hip flexor          Ladder pull          Pec stretch          Post deltoid           Time In:  2:45    Timed Code Treatment Minutes:  15    Total Treatment Minutes:  30min (1 group, 1 aquatic TE)    Treatment/Activity Tolerance:   [x] Patient tolerated treatment

## 2023-06-06 ENCOUNTER — HOSPITAL ENCOUNTER (OUTPATIENT)
Dept: PHYSICAL THERAPY | Age: 53
Setting detail: THERAPIES SERIES
Discharge: HOME | End: 2023-06-06
Payer: MEDICARE

## 2023-06-06 ENCOUNTER — HOSPITAL ENCOUNTER (OUTPATIENT)
Dept: PHYSICAL THERAPY | Age: 53
Setting detail: THERAPIES SERIES
End: 2023-06-06
Payer: MEDICARE

## 2023-06-06 PROCEDURE — 97150 GROUP THERAPEUTIC PROCEDURES: CPT

## 2023-06-06 PROCEDURE — 97113 AQUATIC THERAPY/EXERCISES: CPT

## 2023-06-06 NOTE — FLOWSHEET NOTE
well [] Patient limited by fatigue   [] Patient limited by pain [] Patient limited by other medical complications  [] Other:     Prognosis: [x] Good [] Fair  [] Poor    Patient Requires Follow-up:  [x] Yes  [] No    Plan: [x] Continue per plan of care [] Alter current plan (see comments)   [] Plan of care initiated [] Hold pending MD visit [] Discharge    See Weekly Progress Note: [] Yes  [x] No  Next due:

## 2023-06-08 ENCOUNTER — HOSPITAL ENCOUNTER (OUTPATIENT)
Dept: PHYSICAL THERAPY | Age: 53
Setting detail: THERAPIES SERIES
Discharge: HOME | End: 2023-06-08
Payer: MEDICARE

## 2023-06-08 NOTE — PROGRESS NOTES
[facility], [place] [address] [address]  [place], [address]  Phone: [phone]  Fax [phone]      Physical Therapy  Cancellation/No-show Note  Patient Name:  Can Da Silva  :  1970   Date:  2023  Cancels to date: 0  No-shows to date: 1    For today's appointment patient:  [x] Cancelled  [] Rescheduled appointment  [] No-show     Reason given by patient:  [] Patient ill  [] Conflicting appointment  [] No transportation    [] Conflict with work  [] No reason given  [x] Other:     Comments:  Taking Dad to ER    Electronically signed by:  Zahraa Mendez PT

## 2023-06-20 ENCOUNTER — HOSPITAL ENCOUNTER (OUTPATIENT)
Dept: PHYSICAL THERAPY | Age: 53
Setting detail: THERAPIES SERIES
Discharge: HOME | End: 2023-06-20
Payer: MEDICARE

## 2023-06-20 PROCEDURE — 97113 AQUATIC THERAPY/EXERCISES: CPT

## 2023-06-20 PROCEDURE — 97150 GROUP THERAPEUTIC PROCEDURES: CPT

## 2023-06-20 NOTE — FLOWSHEET NOTE
[facility]  [facility], [facility], [address] [place] [address] Dr  Phone: [phone]  Fax [phone]      Physical Therapy Aquatic Flow Sheet  Date:  6/20/2023    Patient Name:  Aga Agee    Restrictions:    Diagnosis:  Chronic pain syndrome [G89.4]  Treatment Diagnosis:    Insurance/Certification information:  medicare and medicaid  Plan of care signed (Y/N):  sent  Visit# / total visits:  5/16  Pain level: 6/10   Electronically signed by:  Judeth Baumgarten, PT, DPT    Medicare Cap (if applicable):  $467 = total amount used, updated 6/20/2023    Key  B= Belt DB= Dumbells T= Theratube   H= Hydrotone N= Noodles W= Weights   P= Paddles S= Speedo equipment K= Kickboard     Exercises/Activities   Warm-up/Amb    Exercises      Slow forward  2 laps  HR/TR  20x    Slow sideways  2 laps  Marches  2 min    Slow backwards  2 laps  Mini-squats  20x    Medium forward    4-way SLR  20x B    Medium sideways    Hip circles/fig 8  20x B    Small shuffle    Hamstring curls  20x B    Jog    Knee extension  20x B    Braiding    Pelvic tilts  10x5\"    Bicycling  2 min  Scap squeezes  10x5\"        Shoulder flex/ext  20x B w/ open paddles    Functional    Shoulder abd/add  20x B w/ open paddles    Step  x10B  Shoulder H. abd/add  20x B w/ open paddles    Lifting    Shoulder IR/ER      Hand to opp knee    Rowing      Push down squat    Bilateral pull down      UE PNF    Push/pull      LE PNF    Push downs      Wall push ups    Arm circles      SLS  [id number] B  Elbow flex/ext          Chin tuck      Stretching    UT shrugs/rolls      Gastroc/Soleus  2x20\" B  Rocking horse      Hamstring  2x20\" B        SKTC  nv  Other      Piriformis    Tandem stance  2x30\" B    Hip flexor          Ladder pull          Pec stretch          Post deltoid           Time In:  2:30pm    Timed Code Treatment Minutes:  15    Total Treatment Minutes:  35min (1 group, 1 aquatic

## 2023-06-22 ENCOUNTER — HOSPITAL ENCOUNTER (OUTPATIENT)
Dept: PHYSICAL THERAPY | Age: 53
Setting detail: THERAPIES SERIES
Discharge: HOME | End: 2023-06-22
Payer: MEDICARE

## 2023-06-22 PROCEDURE — 97113 AQUATIC THERAPY/EXERCISES: CPT

## 2023-06-22 PROCEDURE — 97150 GROUP THERAPEUTIC PROCEDURES: CPT

## 2023-06-22 NOTE — FLOWSHEET NOTE
[facility]  [facility], [facility], [address] [place] [address]   Phone: [phone]  Fax [phone]      Physical Therapy Aquatic Flow Sheet  Date:  6/22/2023    Patient Name:  Dalila Crowley    Restrictions:    Diagnosis:  Chronic pain syndrome [G89.4]  Treatment Diagnosis:    Insurance/Certification information:  medicare and medicaid  Plan of care signed (Y/N):  sent  Visit# / total visits:  6/16  Pain level: 6/10   Electronically signed by:  Lorna Olivo, PT, DPT    Medicare Cap (if applicable):  $576 = total amount used, updated 6/22/2023    Key  B= Belt DB= Dumbells T= Theratube   H= Hydrotone N= Noodles W= Weights   P= Paddles S= Speedo equipment K= Kickboard     Exercises/Activities   Warm-up/Amb    Exercises      Slow forward  2 laps  HR/TR  20x    Slow sideways  2 laps  Marches  2 min    Slow backwards  2 laps  Mini-squats  20x    Medium forward    4-way SLR  20x B    Medium sideways    Hip circles/fig 8  20x B    Small shuffle    Hamstring curls  20x B    Jog    Knee extension  20x B    Braiding    Pelvic tilts  10x5\"    Bicycling  2 min  Scap squeezes  10x5\"        Shoulder flex/ext  20x B w/ open paddles    Functional    Shoulder abd/add  20x B w/ open paddles    Step  x10B  Shoulder H. abd/add  20x B w/ open paddles    Lifting    Shoulder IR/ER      Hand to opp knee    Rowing      Push down squat    Bilateral pull down      UE PNF    Push/pull      LE PNF    Push downs      Wall push ups    Arm circles      SLS  [id number] B  Elbow flex/ext          Chin tuck      Stretching    UT shrugs/rolls      Gastroc/Soleus  2x20\" B  Rocking horse      Hamstring  2x20\" B        SKTC  nv  Other      Piriformis    Tandem stance  2x30\" B    Hip flexor          Ladder pull          Pec stretch          Post deltoid           Time In:  2:15pm    Timed Code Treatment Minutes:  15    Total Treatment Minutes:  45min (1 group, 1 aquatic

## 2023-06-27 ENCOUNTER — HOSPITAL ENCOUNTER (OUTPATIENT)
Dept: PHYSICAL THERAPY | Age: 53
Setting detail: THERAPIES SERIES
Discharge: HOME | End: 2023-06-27
Payer: MEDICARE

## 2023-06-27 PROCEDURE — 97113 AQUATIC THERAPY/EXERCISES: CPT

## 2023-06-27 PROCEDURE — 97150 GROUP THERAPEUTIC PROCEDURES: CPT

## 2023-06-29 ENCOUNTER — HOSPITAL ENCOUNTER (OUTPATIENT)
Dept: PHYSICAL THERAPY | Age: 53
Setting detail: THERAPIES SERIES
Discharge: HOME | End: 2023-06-29
Payer: MEDICARE

## 2023-06-29 PROCEDURE — 97113 AQUATIC THERAPY/EXERCISES: CPT

## 2023-06-29 PROCEDURE — 97150 GROUP THERAPEUTIC PROCEDURES: CPT

## 2023-07-06 ENCOUNTER — HOSPITAL ENCOUNTER (OUTPATIENT)
Dept: PHYSICAL THERAPY | Age: 53
Setting detail: THERAPIES SERIES
Discharge: HOME | End: 2023-07-06
Payer: MEDICARE

## 2023-07-06 ENCOUNTER — HOSPITAL ENCOUNTER (OUTPATIENT)
Dept: PHYSICAL THERAPY | Age: 53
Setting detail: THERAPIES SERIES
End: 2023-07-06
Payer: MEDICARE

## 2023-07-06 PROCEDURE — 97150 GROUP THERAPEUTIC PROCEDURES: CPT

## 2023-07-06 PROCEDURE — 97113 AQUATIC THERAPY/EXERCISES: CPT

## 2023-07-06 NOTE — FLOWSHEET NOTE
[address] [facility] and Therapy[address], [address] [place] [address]  Phone: [phone]  Fax [phone]      Physical Therapy Aquatic Flow Sheet  Date:  7/6/2023    Patient Name:  Jacquelin Mcgarry    Restrictions:    Diagnosis:  Chronic pain syndrome [G89.4]  Treatment Diagnosis:    Insurance/Certification information:  medicare and medicaid  Plan of care signed (Y/N):  sent  Visit# / total visits:  9/16  Pain level: 7/10   Electronically signed by:  Darinel Simpson, PT, DPT    Medicare Cap (if applicable):  $838 = total amount used, updated 7/6/2023    Key  B= Belt DB= Dumbells T= Theratube   H= Hydrotone N= Noodles W= Weights   P= Paddles S= Speedo equipment K= Kickboard     Exercises/Activities   Warm-up/Amb    Exercises      Slow forward  2 laps  HR/TR  20x    Slow sideways  2 laps  Marches  2 min    Slow backwards  2 laps  Mini-squats  20x    Medium forward    4-way SLR  20x B    Medium sideways    Hip circles/fig 8  20x B    Small shuffle    Hamstring curls  20x B    Jog    Knee extension  20x B    Braiding    Pelvic tilts  10x5\"    Bicycling  2 min  Scap squeezes  10x5\"        Shoulder flex/ext  20x B w/ closed paddles    Functional    Shoulder abd/add  20x B w/ closed  paddles    Step  x15B  Shoulder H. abd/add  20x B w/ closed paddles    Lifting    Shoulder IR/ER      Hand to opp knee    Rowing      Push down squat    Bilateral pull down      UE PNF    Push/pull      LE PNF    Push downs      Wall push ups    Arm circles      SLS  [id number] B  Elbow flex/ext          Chin tuck      Stretching    UT shrugs/rolls      Gastroc/Soleus  2x20\" B  Rocking horse      Hamstring  2x20\" B        SKTC  2x20\" B  Other      Piriformis    Tandem stance  2x30\" B    Hip flexor          Ladder pull          Pec stretch          Post deltoid           Time In:  2:30pm    Timed Code Treatment Minutes:  15    Total Treatment Minutes:  42min (1 group, 1 aquatic

## 2023-07-11 ENCOUNTER — HOSPITAL ENCOUNTER (OUTPATIENT)
Dept: PHYSICAL THERAPY | Age: 53
Setting detail: THERAPIES SERIES
Discharge: HOME | End: 2023-07-11
Payer: MEDICARE

## 2023-07-11 PROCEDURE — 97113 AQUATIC THERAPY/EXERCISES: CPT

## 2023-07-11 PROCEDURE — 97150 GROUP THERAPEUTIC PROCEDURES: CPT

## 2023-07-11 PROCEDURE — 97110 THERAPEUTIC EXERCISES: CPT

## 2023-07-11 PROCEDURE — 97112 NEUROMUSCULAR REEDUCATION: CPT

## 2023-07-11 NOTE — FLOWSHEET NOTE
[facility] and Therapy[address], [address] [facility]  Phone: [phone]  Fax [phone]     Physical Therapy Treatment Note/ Progress Report:           Date:  2023    Patient Name:  Ceasar Shannon    :  1970  MRN: [id number]  Restrictions/Precautions:    Medical/Treatment Diagnosis Information:  Chronic pain syndrome [G89.4]  LBP M 65.3   Insurance/Certification information:   medicare   Physician Information:  FEROZ Hannah*  Has the plan of care been signed (Y/N):        []  Yes  [x]  No     Date of Patient follow up with Physician: in 2.5 weeks       Is this a Progress Report:     []  Yes  [x]  No        If Yes:  Date Range for reporting period:  Beginning 23  Ending     Progress report will be due (10 Rx or 30 days whichever is less):        Recertification will be due (POC Duration  / 90 days whichever is less): 8 weeks         Visit # Insurance Allowable Auth Required   In-person 10 BMN [x]  Yes []  No    Telehealth   []  Yes []  No    Total            Functional Scale:   LEFS    Date assessed:  23  LEFS   Date assessed:  23      Therapy Diagnosis/Practice Pattern: 4F       Number of Comorbidities:  []0     []1-2    [x]3+    Latex Allergy:  [x]NO      []YES  Preferred Language for Healthcare:   [x]English       []other:      Pain level:  8/10 LB and RLE     SUBJECTIVE:  Pt arrived 10 min late for her appt. Pt states the pool therapy has helped her with her pain. Pt states mobility has improved even though she still has the pain. HEP performed every other day.       OBJECTIVE: See eval  Observation:   Test measurements:      RESTRICTIONS/PRECAUTIONS: HTN, L TKR, OA, RA ; lumbar DDD, depression, obesity     Exercises/Interventions:     Therapeutic Ex (56465) Sets/sec Reps Notes/CUES   Supine TrA with hip add  5\"  x10  Hep    Supine LTR   x10  Hep    Edu diagnosis  5 min

## 2023-07-11 NOTE — FLOWSHEET NOTE
[address] [facility] and Therapy[address], [address] [place] [address]  Phone: [phone]  Fax [phone]      Physical Therapy Aquatic Flow Sheet  Date:  7/11/2023    Patient Name:  Yao Pompa    Restrictions:    Diagnosis:  Chronic pain syndrome [G89.4]  Treatment Diagnosis:    Insurance/Certification information:  medicare and medicaid  Plan of care signed (Y/N):  sent  Visit# / total visits:  10/16  Pain level: 8/10 \"I am really sore after seeing Addis Boston today on land\"    Electronically signed by:  Mel Rubi, PT, DPT    Medicare Cap (if applicable):  $742 = total amount used, updated 7/11/2023    Key  B= Belt DB= Dumbells T= Theratube   H= Hydrotone N= Noodles W= Weights   P= Paddles S= Speedo equipment K= Kickboard     Exercises/Activities   Warm-up/Amb    Exercises      Slow forward  2 laps  HR/TR  20x    Slow sideways  2 laps  Marches  2 min    Slow backwards  2 laps  Mini-squats  20x    Medium forward    4-way SLR  20x B    Medium sideways    Hip circles/fig 8  20x B    Small shuffle    Hamstring curls  20x B    Jog    Knee extension  20x B    Braiding    Pelvic tilts  10x5\"    Bicycling  2 min  Scap squeezes  10x5\"        Shoulder flex/ext  20x B w/ closed paddles    Functional    Shoulder abd/add  20x B w/ closed  paddles    Step  x15B  Shoulder H. abd/add  20x B w/ closed paddles    Lifting    Shoulder IR/ER      Hand to opp knee    Rowing      Push down squat    Bilateral pull down      UE PNF    Push/pull      LE PNF    Push downs      Wall push ups    Arm circles      SLS  [id number] B  Elbow flex/ext          Chin tuck      Stretching    UT shrugs/rolls      Gastroc/Soleus  2x20\" B  Rocking horse      Hamstring  2x20\" B        SKTC  2x20\" B  Other      Piriformis    Tandem stance  2x30\" B    Hip flexor          Ladder pull          Pec stretch          Post deltoid           Time In:  2:00pm    Timed Code Treatment Minutes:

## 2023-07-13 ENCOUNTER — APPOINTMENT (OUTPATIENT)
Dept: PHYSICAL THERAPY | Age: 53
End: 2023-07-13
Payer: MEDICARE

## 2023-07-18 ENCOUNTER — HOSPITAL ENCOUNTER (OUTPATIENT)
Dept: PHYSICAL THERAPY | Age: 53
Setting detail: THERAPIES SERIES
Discharge: HOME | End: 2023-07-18
Payer: MEDICARE

## 2023-07-18 PROCEDURE — 97150 GROUP THERAPEUTIC PROCEDURES: CPT

## 2023-07-18 PROCEDURE — 97113 AQUATIC THERAPY/EXERCISES: CPT

## 2023-07-18 NOTE — FLOWSHEET NOTE
700 [facility] and Therapy[address], [address] [place] [address]  Phone: [phone]  Fax [phone]      Physical Therapy Aquatic Flow Sheet  Date:  7/18/2023    Patient Name:  Mally Ron    Restrictions:    Diagnosis:  Chronic pain syndrome [G89.4]  Treatment Diagnosis:    Insurance/Certification information:  medicare and medicaid  Plan of care signed (Y/N):  sent  Visit# / total visits:  10/16  Pain level: 6/10 back \"not too bad today\"    Electronically signed by:  Oscar Hartmann, PT, DPT    Medicare Cap (if applicable):  $406 = total amount used, updated 7/18/2023    Key  B= Belt DB= Dumbells T= Theratube   H= Hydrotone N= Noodles W= Weights   P= Paddles S= Speedo equipment K= Kickboard     Exercises/Activities   Warm-up/Amb    Exercises      Slow forward  2 laps  HR/TR  20x    Slow sideways  2 laps  Marches  2 min    Slow backwards  2 laps  Mini-squats  20x    Medium forward    4-way SLR  20x B    Medium sideways    Hip circles/fig 8  20x B    Small shuffle    Hamstring curls  20x B    Jog    Knee extension  20x B    Braiding    Pelvic tilts  10x5\"    Bicycling  2 min  Scap squeezes  10x5\"        Shoulder flex/ext  20x B w/ closed paddles    Functional    Shoulder abd/add  20x B w/ closed  paddles    Step  x15B  Shoulder H. abd/add  20x B w/ closed paddles    Lifting    Shoulder IR/ER      Hand to opp knee    Rowing      Push down squat    Bilateral pull down      UE PNF    Push/pull      LE PNF    Push downs      Wall push ups    Arm circles      SLS  [id number] B  Elbow flex/ext          Chin tuck      Stretching    UT shrugs/rolls      Gastroc/Soleus  2x20\" B  Rocking horse      Hamstring  2x20\" B        SKTC  2x20\" B  Other      Piriformis    Tandem stance  2x30\" B    Hip flexor          Ladder pull          Pec stretch          Post deltoid           Time In:  2:30pm    Timed Code Treatment Minutes:  15    Total Treatment Minutes:

## 2023-07-20 ENCOUNTER — HOSPITAL ENCOUNTER (OUTPATIENT)
Dept: PHYSICAL THERAPY | Age: 53
Setting detail: THERAPIES SERIES
Discharge: HOME | End: 2023-07-20
Payer: MEDICARE

## 2023-07-20 NOTE — PROGRESS NOTES
700 [facility] and Therapy[address], [address] [place] [address]  Phone: [phone]  Fax [phone]      Physical Therapy  Cancellation/No-show Note  Patient Name:  Verner Revel  :  1970   Date:  2023  Cancels to date: 1  No-shows to date: 2    For today's appointment patient:  [] Cancelled  [] Rescheduled appointment  [x] No-show     Reason given by patient:  [] Patient ill  [] Conflicting appointment  [] No transportation    [] Conflict with work  [] No reason given  [] Other:     Comments:      Electronically signed by:  Devon Orozco PT

## 2023-07-25 ENCOUNTER — HOSPITAL ENCOUNTER (OUTPATIENT)
Dept: PHYSICAL THERAPY | Age: 53
Setting detail: THERAPIES SERIES
Discharge: HOME | End: 2023-07-25
Payer: MEDICARE

## 2023-07-25 NOTE — PROGRESS NOTES
700 [facility] and Therapy[address], [address] [place] [address]  Phone: [phone]  Fax [phone]      Physical Therapy  Cancellation/No-show Note  Patient Name:  Everett Castillo  :  1970   Date:  2023  Cancels to date: 1  No-shows to date: 3    For today's appointment patient:  [] Cancelled  [] Rescheduled appointment  [x] No-show     Reason given by patient:  [] Patient ill  [] Conflicting appointment  [] No transportation    [] Conflict with work  [] No reason given  [] Other:     Comments:      Electronically signed by:  Latrice Peterson, PT

## 2023-07-27 ENCOUNTER — HOSPITAL ENCOUNTER (OUTPATIENT)
Dept: PHYSICAL THERAPY | Age: 53
Setting detail: THERAPIES SERIES
Discharge: HOME | End: 2023-07-27
Payer: MEDICARE

## 2023-07-27 PROCEDURE — 97150 GROUP THERAPEUTIC PROCEDURES: CPT

## 2023-07-27 PROCEDURE — 97113 AQUATIC THERAPY/EXERCISES: CPT

## 2023-07-27 NOTE — FLOWSHEET NOTE
[facility], [address] [place] [address], [address] [place] [address]  Phone: [phone]  Fax [phone]      Physical Therapy Aquatic Flow Sheet  Date:  7/27/2023    Patient Name:  Fernando Rome    Restrictions:    Diagnosis:  Chronic pain syndrome [G89.4]  Treatment Diagnosis:    Insurance/Certification information:  medicare and medicaid  Plan of care signed (Y/N):  sent  Visit# / total visits:  11/16  Pain level: 7/10 back \"that' my normal\", cues throughout for form and decreasing speed and control     Electronically signed by:  Natasha Mattson, PT, DPT    Medicare Cap (if applicable):  $939 = total amount used, updated 7/27/2023    Key  B= Belt DB= Dumbells T= Theratube   H= Hydrotone N= Noodles W= Weights   P= Paddles S= Speedo equipment K= Kickboard     Exercises/Activities   Warm-up/Amb    Exercises      Slow forward  2 laps  HR/TR  20x    Slow sideways  2 laps  Marches  2 min    Slow backwards  2 laps  Mini-squats  20x    Medium forward    4-way SLR  20x B    Medium sideways    Hip circles/fig 8  20x B    Small shuffle    Hamstring curls  20x B    Jog    Knee extension  20x B    Braiding    Pelvic tilts   Bicycling  2 min  Scap squeezes       Shoulder flex/ext  20x B w/ closed paddles    Functional    Shoulder abd/add  20x B w/ closed  paddles    Step  x15B  Shoulder H. abd/add  20x B w/ closed paddles    Lifting    Shoulder IR/ER      Hand to opp knee    Rowing      Push down squat    Bilateral pull down      UE PNF    Push/pull      LE PNF    Push downs      Wall push ups    Arm circles      SLS  [id number] B  Elbow flex/ext          Chin tuck      Stretching    UT shrugs/rolls      Gastroc/Soleus  2x20\" B  Rocking horse      Hamstring        SKTC   Other      Piriformis    Tandem stance  2x30\" B    Hip flexor          Ladder pull          Pec stretch          Post deltoid           Time In:  2:30pm    Timed Code Treatment Minutes:  15    Total

## 2023-08-01 ENCOUNTER — HOSPITAL ENCOUNTER (OUTPATIENT)
Dept: PHYSICAL THERAPY | Age: 53
Setting detail: THERAPIES SERIES
Discharge: HOME | End: 2023-08-01
Payer: MEDICARE

## 2023-08-01 PROCEDURE — 97150 GROUP THERAPEUTIC PROCEDURES: CPT

## 2023-08-01 PROCEDURE — 97113 AQUATIC THERAPY/EXERCISES: CPT

## 2023-08-01 NOTE — FLOWSHEET NOTE
700 [facility] and Therapy[address], [address] [place] [address]  Phone: [phone]  Fax [phone]      Physical Therapy Aquatic Flow Sheet  Date:  8/1/2023    Patient Name:  Delgado Georgia    Restrictions:    Diagnosis:  Chronic pain syndrome [G89.4]  Treatment Diagnosis:    Insurance/Certification information:  medicare and medicaid  Plan of care signed (Y/N):  sent  Visit# / total visits:  12/16  Pain level: 6/10 back     Electronically signed by:  Adal De La Cruz, PT, DPT    Medicare Cap (if applicable):  $710 = total amount used, updated 8/1/2023    Key  B= Belt DB= Dumbells T= Theratube   H= Hydrotone N= Noodles W= Weights   P= Paddles S= Speedo equipment K= Kickboard     Exercises/Activities   Warm-up/Amb    Exercises      Slow forward  2 laps  HR/TR  20x    Slow sideways  2 laps  Marches  2 min    Slow backwards  2 laps  Mini-squats  20x    Medium forward    4-way SLR  20x B    Medium sideways    Hip circles/fig 8  20x B    Small shuffle    Hamstring curls  20x B    Jog    Knee extension  20x B    Braiding    Pelvic tilts  10x10\"    Bicycling  2 min  Scap squeezes  10x5\"        Shoulder flex/ext  20x B w/ closed paddles    Functional    Shoulder abd/add  20x B w/ closed  paddles    Step  x15B  Shoulder H. abd/add  20x B w/ closed paddles    Lifting    Shoulder IR/ER      Hand to opp knee    Rowing      Push down squat    Bilateral pull down      UE PNF    Push/pull      LE PNF    Push downs      Wall push ups    Arm circles      SLS  [id number] B  Elbow flex/ext          Chin tuck      Stretching    UT shrugs/rolls      Gastroc/Soleus  2x20\" B  Rocking horse      Hamstring  2x20\" B        SKTC  2x20\" B  Other      Piriformis    Tandem stance  2x30\" B    Hip flexor          Ladder pull          Pec stretch          Post deltoid           Time In:  2:30pm    Timed Code Treatment Minutes:  15    Total Treatment Minutes:  45min (1 group, 1

## 2023-08-03 ENCOUNTER — APPOINTMENT (OUTPATIENT)
Dept: PHYSICAL THERAPY | Age: 53
End: 2023-08-03
Payer: MEDICARE

## 2023-08-08 ENCOUNTER — HOSPITAL ENCOUNTER (OUTPATIENT)
Dept: PHYSICAL THERAPY | Age: 53
Setting detail: THERAPIES SERIES
Discharge: HOME | End: 2023-08-08
Payer: MEDICARE

## 2023-08-08 PROCEDURE — 97113 AQUATIC THERAPY/EXERCISES: CPT

## 2023-08-08 PROCEDURE — 97150 GROUP THERAPEUTIC PROCEDURES: CPT

## 2023-08-08 NOTE — FLOWSHEET NOTE
700 [facility] and Therapy[address], [address] [place] [address]  Phone: [phone]  Fax [phone]      Physical Therapy Aquatic Flow Sheet  Date:  8/8/2023    Patient Name:  Elisabeth Dias    Restrictions:    Diagnosis:  Chronic pain syndrome [G89.4]  Treatment Diagnosis:    Insurance/Certification information:  medicare and medicaid  Plan of care signed (Y/N):  sent  Visit# / total visits:  13/16  Pain level: 8/10 back \"it hurts today\"    Electronically signed by:  Sophia Soliman, PT, DPT    Medicare Cap (if applicable):  $336 = total amount used, updated 8/8/2023    Key  B= Belt DB= Dumbells T= Theratube   H= Hydrotone N= Noodles W= Weights   P= Paddles S= Speedo equipment K= Kickboard     Exercises/Activities   Warm-up/Amb    Exercises      Slow forward  2 laps  HR/TR  20x    Slow sideways  2 laps  Marches  2 min    Slow backwards  2 laps  Mini-squats  20x    Medium forward    4-way SLR  20x B    Medium sideways    Hip circles/fig 8  20x B    Small shuffle    Hamstring curls  20x B    Jog    Knee extension  20x B    Braiding    Pelvic tilts  10x10\"    Bicycling  2 min  Scap squeezes  10x5\"        Shoulder flex/ext  20x B w/ closed paddles    Functional    Shoulder abd/add  20x B w/ closed  paddles    Step   Shoulder H. abd/add  20x B w/ closed paddles    Lifting    Shoulder IR/ER      Hand to opp knee    Rowing      Push down squat    Bilateral pull down      UE PNF    Push/pull      LE PNF    Push downs      Wall push ups    Arm circles      SLS  [id number] B  Elbow flex/ext          Chin tuck      Stretching    UT shrugs/rolls      Gastroc/Soleus  2x20\" B  Rocking horse      Hamstring  2x20\" B        SKTC  2x20\" B  Other      Piriformis    Tandem stance  2x30\" B    Hip flexor          Ladder pull          Pec stretch          Post deltoid           Time In:  2:40pm    Timed Code Treatment Minutes:  15    Total Treatment Minutes:  35min (1

## 2023-08-10 ENCOUNTER — OFFICE VISIT (OUTPATIENT)
Dept: PRIMARY CARE CLINIC | Age: 53
End: 2023-08-10
Payer: MEDICARE

## 2023-08-10 VITALS
TEMPERATURE: 97.7 F | OXYGEN SATURATION: 99 % | HEART RATE: 90 BPM | BODY MASS INDEX: 51.82 KG/M2 | SYSTOLIC BLOOD PRESSURE: 120 MMHG | DIASTOLIC BLOOD PRESSURE: 86 MMHG | WEIGHT: 293 LBS

## 2023-08-10 DIAGNOSIS — Z23 NEED FOR VACCINATION WITH 20-POLYVALENT PNEUMOCOCCAL CONJUGATE VACCINE: ICD-10-CM

## 2023-08-10 DIAGNOSIS — R73.9 HYPERGLYCEMIA: ICD-10-CM

## 2023-08-10 DIAGNOSIS — Z00.00 INITIAL MEDICARE ANNUAL WELLNESS VISIT: Primary | ICD-10-CM

## 2023-08-10 DIAGNOSIS — Z12.31 ENCOUNTER FOR SCREENING MAMMOGRAM FOR MALIGNANT NEOPLASM OF BREAST: ICD-10-CM

## 2023-08-10 DIAGNOSIS — Z13.220 SCREENING CHOLESTEROL LEVEL: ICD-10-CM

## 2023-08-10 DIAGNOSIS — G47.33 OBSTRUCTIVE SLEEP APNEA SYNDROME: ICD-10-CM

## 2023-08-10 DIAGNOSIS — D68.62 LUPUS ANTICOAGULANT DISORDER (HCC): ICD-10-CM

## 2023-08-10 DIAGNOSIS — D50.8 IRON DEFICIENCY ANEMIA SECONDARY TO INADEQUATE DIETARY IRON INTAKE: ICD-10-CM

## 2023-08-10 DIAGNOSIS — E66.01 CLASS 3 SEVERE OBESITY DUE TO EXCESS CALORIES WITH SERIOUS COMORBIDITY AND BODY MASS INDEX (BMI) OF 50.0 TO 59.9 IN ADULT (HCC): ICD-10-CM

## 2023-08-10 PROCEDURE — 1036F TOBACCO NON-USER: CPT | Performed by: NURSE PRACTITIONER

## 2023-08-10 PROCEDURE — G8417 CALC BMI ABV UP PARAM F/U: HCPCS | Performed by: NURSE PRACTITIONER

## 2023-08-10 PROCEDURE — 90677 PCV20 VACCINE IM: CPT | Performed by: NURSE PRACTITIONER

## 2023-08-10 PROCEDURE — 3074F SYST BP LT 130 MM HG: CPT | Performed by: NURSE PRACTITIONER

## 2023-08-10 PROCEDURE — 3079F DIAST BP 80-89 MM HG: CPT | Performed by: NURSE PRACTITIONER

## 2023-08-10 PROCEDURE — 3017F COLORECTAL CA SCREEN DOC REV: CPT | Performed by: NURSE PRACTITIONER

## 2023-08-10 PROCEDURE — G0438 PPPS, INITIAL VISIT: HCPCS | Performed by: NURSE PRACTITIONER

## 2023-08-10 PROCEDURE — G0009 ADMIN PNEUMOCOCCAL VACCINE: HCPCS | Performed by: NURSE PRACTITIONER

## 2023-08-10 PROCEDURE — 99213 OFFICE O/P EST LOW 20 MIN: CPT | Performed by: NURSE PRACTITIONER

## 2023-08-10 PROCEDURE — G8427 DOCREV CUR MEDS BY ELIG CLIN: HCPCS | Performed by: NURSE PRACTITIONER

## 2023-08-10 ASSESSMENT — LIFESTYLE VARIABLES
HOW OFTEN DO YOU HAVE A DRINK CONTAINING ALCOHOL: MONTHLY OR LESS
HOW MANY STANDARD DRINKS CONTAINING ALCOHOL DO YOU HAVE ON A TYPICAL DAY: 1 OR 2

## 2023-08-10 NOTE — PROGRESS NOTES
Consulting Physician (Otolaryngology)  Liang Mayberry MD as Consulting Physician ([facility])  Angelica Babb MD as Physician (Psychiatry)     Reviewed and updated this visit:  Tobacco  Allergies  Meds  Med Hx  Surg Hx  Soc Hx  Fam Hx

## 2023-09-07 DIAGNOSIS — R73.9 HYPERGLYCEMIA: ICD-10-CM

## 2023-09-07 DIAGNOSIS — G47.33 OBSTRUCTIVE SLEEP APNEA SYNDROME: ICD-10-CM

## 2023-09-07 DIAGNOSIS — D50.8 IRON DEFICIENCY ANEMIA SECONDARY TO INADEQUATE DIETARY IRON INTAKE: ICD-10-CM

## 2023-09-07 DIAGNOSIS — E66.01 CLASS 3 SEVERE OBESITY DUE TO EXCESS CALORIES WITH SERIOUS COMORBIDITY AND BODY MASS INDEX (BMI) OF 50.0 TO 59.9 IN ADULT (HCC): ICD-10-CM

## 2023-09-07 DIAGNOSIS — Z13.220 SCREENING CHOLESTEROL LEVEL: ICD-10-CM

## 2023-09-07 LAB
ALBUMIN SERPL-MCNC: 4 G/DL (ref 3.4–5)
ALBUMIN/GLOB SERPL: 1.5 {RATIO} (ref 1.1–2.2)
ALP SERPL-CCNC: 117 U/L (ref 40–129)
ALT SERPL-CCNC: 18 U/L (ref 10–40)
ANION GAP SERPL CALCULATED.3IONS-SCNC: 11 MMOL/L (ref 3–16)
AST SERPL-CCNC: 22 U/L (ref 15–37)
BASOPHILS # BLD: 0 K/UL (ref 0–0.2)
BASOPHILS NFR BLD: 0.6 %
BILIRUB SERPL-MCNC: 0.5 MG/DL (ref 0–1)
BUN SERPL-MCNC: 6 MG/DL (ref 7–20)
CALCIUM SERPL-MCNC: 9.2 MG/DL (ref 8.3–10.6)
CHLORIDE SERPL-SCNC: 106 MMOL/L (ref 99–110)
CHOLEST SERPL-MCNC: 203 MG/DL (ref 0–199)
CO2 SERPL-SCNC: 23 MMOL/L (ref 21–32)
CREAT SERPL-MCNC: 1 MG/DL (ref 0.6–1.1)
DEPRECATED RDW RBC AUTO: 17 % (ref 12.4–15.4)
EOSINOPHIL # BLD: 0.1 K/UL (ref 0–0.6)
EOSINOPHIL NFR BLD: 1.6 %
GFR SERPLBLD CREATININE-BSD FMLA CKD-EPI: >60 ML/MIN/{1.73_M2}
GLUCOSE SERPL-MCNC: 114 MG/DL (ref 70–99)
HCT VFR BLD AUTO: 39.1 % (ref 36–48)
HDLC SERPL-MCNC: 51 MG/DL (ref 40–60)
HGB BLD-MCNC: 12.9 G/DL (ref 12–16)
IRON SATN MFR SERPL: 10 % (ref 15–50)
IRON SERPL-MCNC: 37 UG/DL (ref 37–145)
LDLC SERPL CALC-MCNC: 130 MG/DL
LYMPHOCYTES # BLD: 1.4 K/UL (ref 1–5.1)
LYMPHOCYTES NFR BLD: 24.1 %
MCH RBC QN AUTO: 24.5 PG (ref 26–34)
MCHC RBC AUTO-ENTMCNC: 32.9 G/DL (ref 31–36)
MCV RBC AUTO: 74.6 FL (ref 80–100)
MONOCYTES # BLD: 0.4 K/UL (ref 0–1.3)
MONOCYTES NFR BLD: 7.6 %
NEUTROPHILS # BLD: 3.8 K/UL (ref 1.7–7.7)
NEUTROPHILS NFR BLD: 66.1 %
PLATELET # BLD AUTO: 232 K/UL (ref 135–450)
PLATELET BLD QL SMEAR: ABNORMAL
PMV BLD AUTO: 8.1 FL (ref 5–10.5)
POTASSIUM SERPL-SCNC: 4.2 MMOL/L (ref 3.5–5.1)
PROT SERPL-MCNC: 6.6 G/DL (ref 6.4–8.2)
RBC # BLD AUTO: 5.24 M/UL (ref 4–5.2)
SLIDE REVIEW: ABNORMAL
SODIUM SERPL-SCNC: 140 MMOL/L (ref 136–145)
TIBC SERPL-MCNC: 386 UG/DL (ref 260–445)
TRIGL SERPL-MCNC: 112 MG/DL (ref 0–150)
VLDLC SERPL CALC-MCNC: 22 MG/DL
WBC # BLD AUTO: 5.8 K/UL (ref 4–11)

## 2023-09-08 ENCOUNTER — TELEMEDICINE (OUTPATIENT)
Dept: PRIMARY CARE CLINIC | Age: 53
End: 2023-09-08

## 2023-09-08 DIAGNOSIS — R52 GENERALIZED BODY ACHES: Primary | ICD-10-CM

## 2023-09-08 DIAGNOSIS — I50.9 CONGESTIVE HEART FAILURE, UNSPECIFIED HF CHRONICITY, UNSPECIFIED HEART FAILURE TYPE (HCC): ICD-10-CM

## 2023-09-08 LAB
EST. AVERAGE GLUCOSE BLD GHB EST-MCNC: 111.2 MG/DL
HBA1C MFR BLD: 5.5 %
INFLUENZA A ANTIBODY: NEGATIVE
INFLUENZA B ANTIBODY: NEGATIVE
Lab: NORMAL
QC PASS/FAIL: NORMAL
SARS-COV-2 RDRP RESP QL NAA+PROBE: NEGATIVE

## 2023-09-08 RX ORDER — VILAZODONE HYDROCHLORIDE 10 MG/1
10 TABLET ORAL
COMMUNITY
Start: 2023-08-31

## 2023-09-08 NOTE — PROGRESS NOTES
Clifton Bryant (:  1970) is a 46 y.o. female,Established patient, here for evaluation of the following chief complaint(s): No chief complaint on file. Pt reports congestion sore throat nasal stuffiness body aches some  sob no cp   No chills has not checked temperature   Has CHF but has not taken any additional lasix feels like she has crackles in lungs   Did take covid test yesterday that was about one year old and it was negative  No other family members with similar symptoms     ASSESSMENT/PLAN:  1. Generalized body aches  Assessment & Plan:  Flu and covid test at 1:45 today   Orders:  -     POCT COVID-19 Rapid, NAAT  -     POCT Influenza A/B  2. Congestive heart failure, unspecified HF chronicity, unspecified heart failure type ([address])  Assessment & Plan:  Pulse ox and lung assessment today at 1:45 pm    Pulse OX 97% heart rate 87 temp 98.1  Speech clear and appropriate able to complete full sentences   Lugs clear per auscultation  Call placed to discuss test results no answer left vm   Pt notified of all results as negative     No follow-ups on file. pt will present to back of office today for evaluation of lungs and testing for covi and flu pt is agreeable to this plan     SUBJECTIVE/OBJECTIVE:      [INSTRUCTIONS:  \"[x]\" Indicates a positive item  \"[]\" Indicates a negative item  -- DELETE ALL ITEMS NOT EXAMINED]    Constitutional: [x] Appears well-developed and well-nourished [x] No apparent distress      [] Abnormal -     Mental status: [x] Alert and awake  [x] Oriented to person/place/time [x] Able to follow commands    [] Abnormal -     Eyes:   EOM    [x]  Normal    [] Abnormal -   Sclera  [x]  Normal    [] Abnormal -          Discharge [x]  None visible   [] Abnormal -     HENT: [x] Normocephalic, atraumatic  [] Abnormal -   [x] Mouth/Throat: Mucous membranes are moist    External Ears [x] Normal  [] Abnormal -    Neck: [x] No visualized mass [] Abnormal -     Pulmonary/Chest: [x] Respiratory effort

## 2023-09-19 ENCOUNTER — OFFICE VISIT (OUTPATIENT)
Dept: CARDIOLOGY CLINIC | Age: 53
End: 2023-09-19
Payer: MEDICARE

## 2023-09-19 VITALS
SYSTOLIC BLOOD PRESSURE: 131 MMHG | HEIGHT: 66 IN | HEART RATE: 88 BPM | OXYGEN SATURATION: 97 % | WEIGHT: 293 LBS | BODY MASS INDEX: 47.09 KG/M2 | DIASTOLIC BLOOD PRESSURE: 87 MMHG

## 2023-09-19 DIAGNOSIS — I50.9 CONGESTIVE HEART FAILURE, UNSPECIFIED HF CHRONICITY, UNSPECIFIED HEART FAILURE TYPE (HCC): ICD-10-CM

## 2023-09-19 DIAGNOSIS — I10 HYPERTENSION, ESSENTIAL: ICD-10-CM

## 2023-09-19 DIAGNOSIS — I50.9 CONGESTIVE HEART FAILURE, UNSPECIFIED HF CHRONICITY, UNSPECIFIED HEART FAILURE TYPE (HCC): Primary | ICD-10-CM

## 2023-09-19 DIAGNOSIS — R06.02 SOB (SHORTNESS OF BREATH): ICD-10-CM

## 2023-09-19 DIAGNOSIS — E66.01 MORBID OBESITY WITH BMI OF 45.0-49.9, ADULT (HCC): ICD-10-CM

## 2023-09-19 DIAGNOSIS — M79.89 SWELLING OF BOTH LOWER EXTREMITIES: ICD-10-CM

## 2023-09-19 PROCEDURE — G8417 CALC BMI ABV UP PARAM F/U: HCPCS | Performed by: INTERNAL MEDICINE

## 2023-09-19 PROCEDURE — 3079F DIAST BP 80-89 MM HG: CPT | Performed by: INTERNAL MEDICINE

## 2023-09-19 PROCEDURE — G8427 DOCREV CUR MEDS BY ELIG CLIN: HCPCS | Performed by: INTERNAL MEDICINE

## 2023-09-19 PROCEDURE — 3075F SYST BP GE 130 - 139MM HG: CPT | Performed by: INTERNAL MEDICINE

## 2023-09-19 PROCEDURE — 93000 ELECTROCARDIOGRAM COMPLETE: CPT | Performed by: INTERNAL MEDICINE

## 2023-09-19 PROCEDURE — 99204 OFFICE O/P NEW MOD 45 MIN: CPT | Performed by: INTERNAL MEDICINE

## 2023-09-19 NOTE — PROGRESS NOTES
[address]   Cardiovascular Evaluation    PATIENT: Delfin Media: 2023  MRN: [id number]  CSN: [id number]  : 1970    Primary Care Doctor: FEROZ Johnson CNP    Reason for evaluation:   New Patient, Congestive Heart Failure, Hypertension, Hyperlipidemia, Shortness of Breath (With activity/walking ), and Edema (legs)      Subjective:    History of present illness on initial date of evaluation:   Greyson Araujo is a 46 y.o. female patient who is a new patient for cardiology evaluation and treatment for heart failure. She has a history of pulmonary emboli, deep vein thrombosis, and  hypertension. She underwent an echocardiogram 17 which showed EF 55%, Grade 1 DD, moderate left ventricular hypertrophy. Deneise Roe on 17 demonstrated a large defect in anterior wall at stress that does not reverse at rest that is felt to be due to breast attenuation. Today she has been struggling with shortness of breath. She is adopted unknown family history. She denies smoking. She has ongoing swelling to BLE. Patient currently denies any palpitations, chest pain,  dizziness, and syncope. She declines physical activity regimen.      Patient Active Problem List   Diagnosis    Disc displacement, lumbar    Lumbar stenosis    Lumbar facet arthropathy    Opiate analgesic contract exists    Venous insufficiency    Pain in joint, lower leg    Localized osteoarthrosis, lower leg    Cervical disc displacement    Acute diastolic congestive heart failure (HCC)    Morbid obesity with BMI of 45.0-49.9, adult (HCC)    Lupus anticoagulant disorder (HCC)    LVH (left ventricular hypertrophy)    Tear of lateral meniscus of left knee    Primary osteoarthritis of left knee    Hypertension, essential    Sleep apnea, obstructive    Anxiety    Arthritis    Fibromyalgia    Depression    Migraine    Back pain    Narcolepsy    Mixed hyperlipidemia    Iron deficiency    Vitamin D

## 2023-09-19 NOTE — PATIENT INSTRUCTIONS
Your provider has ordered testing for further evaluation. An order/prescription has been included in your paper work. To schedule outpatient testing, contact Central Scheduling by calling [employer] ([phone]). Plan:  1. Order BNP ~ assess heart failure  2. Order echocardiogram ~ shortness of breath  ~A test that records movement of your heart valves and chambers by ultrasound. Evaluates heart valves, any chamber enlargement, abnormal openings, or any fluid in the sac surrounding the heart. 3. Order CT Calcium score ~ shortness of breath   ~ A specialized X-ray test that provides images of your heart that can help your doctor determine a score based on the detection and measurement of calcium-containing plaque in the arteries. [facility] Imaging information cardiac calcium score limited  [facility] at [address] [place] [address] to call [phone].    4. Patient given detailed instructions on addressing diet, regular exercise, weight control, smoking abstention, medication compliance, and stress minimization. The patient was provided written and verbal instructions regarding risk factor modification. 5. Follow up based on testing results.

## 2023-09-20 ENCOUNTER — TELEPHONE (OUTPATIENT)
Dept: CARDIOLOGY CLINIC | Age: 53
End: 2023-09-20

## 2023-09-20 LAB — NT-PROBNP SERPL-MCNC: 88 PG/ML (ref 0–124)

## 2023-09-20 NOTE — TELEPHONE ENCOUNTER
----- Message from Toni Koo MD sent at 9/20/2023 11:36 AM EDT -----  Please let the patient know that her BNP level is normal.     Although it is not the only diagnostic blood test for heart failure, a normal level is indicative of normal fluid levels.

## 2023-09-27 ENCOUNTER — TELEPHONE (OUTPATIENT)
Dept: PRIMARY CARE CLINIC | Age: 53
End: 2023-09-27

## 2023-09-27 DIAGNOSIS — R79.89 ABNORMAL CBC: Primary | ICD-10-CM

## 2023-09-27 NOTE — TELEPHONE ENCOUNTER
In last lab notes provider noted      \"Patient's red count is slightly up this could be related to recent infection. Iron saturation is still low. Would like to recheck blood work in 2 weeks. Patient's A1c is stable it is more elevated than previously. At 5.5 but still not in prediabetic range. Spoke with bariatric partner. I do think it would be important for you to go back and see them. \"     Pt went to lab to get completed and there are no orders. Pt would like to have orders placed to get completed at a [facility] facility.

## 2023-11-10 ENCOUNTER — OFFICE VISIT (OUTPATIENT)
Dept: PRIMARY CARE CLINIC | Age: 53
End: 2023-11-10
Payer: MEDICARE

## 2023-11-10 VITALS
OXYGEN SATURATION: 97 % | RESPIRATION RATE: 18 BRPM | DIASTOLIC BLOOD PRESSURE: 84 MMHG | TEMPERATURE: 98.7 F | BODY MASS INDEX: 47.09 KG/M2 | HEIGHT: 66 IN | WEIGHT: 293 LBS | SYSTOLIC BLOOD PRESSURE: 128 MMHG | HEART RATE: 79 BPM

## 2023-11-10 DIAGNOSIS — Z23 FLU VACCINE NEED: ICD-10-CM

## 2023-11-10 DIAGNOSIS — M25.511 ACUTE PAIN OF RIGHT SHOULDER: ICD-10-CM

## 2023-11-10 DIAGNOSIS — F33.1 MODERATE EPISODE OF RECURRENT MAJOR DEPRESSIVE DISORDER (HCC): Primary | ICD-10-CM

## 2023-11-10 PROCEDURE — 3078F DIAST BP <80 MM HG: CPT | Performed by: NURSE PRACTITIONER

## 2023-11-10 PROCEDURE — G8482 FLU IMMUNIZE ORDER/ADMIN: HCPCS | Performed by: NURSE PRACTITIONER

## 2023-11-10 PROCEDURE — 99214 OFFICE O/P EST MOD 30 MIN: CPT | Performed by: NURSE PRACTITIONER

## 2023-11-10 PROCEDURE — G0008 ADMIN INFLUENZA VIRUS VAC: HCPCS | Performed by: NURSE PRACTITIONER

## 2023-11-10 PROCEDURE — 3017F COLORECTAL CA SCREEN DOC REV: CPT | Performed by: NURSE PRACTITIONER

## 2023-11-10 PROCEDURE — G8427 DOCREV CUR MEDS BY ELIG CLIN: HCPCS | Performed by: NURSE PRACTITIONER

## 2023-11-10 PROCEDURE — 90674 CCIIV4 VAC NO PRSV 0.5 ML IM: CPT | Performed by: NURSE PRACTITIONER

## 2023-11-10 PROCEDURE — 1036F TOBACCO NON-USER: CPT | Performed by: NURSE PRACTITIONER

## 2023-11-10 PROCEDURE — 3074F SYST BP LT 130 MM HG: CPT | Performed by: NURSE PRACTITIONER

## 2023-11-10 PROCEDURE — G8417 CALC BMI ABV UP PARAM F/U: HCPCS | Performed by: NURSE PRACTITIONER

## 2023-11-10 ASSESSMENT — ENCOUNTER SYMPTOMS: COUGH: 0

## 2023-11-10 NOTE — PROGRESS NOTES
PROGRESS NOTE  Date of Service:  11/10/2023  Address: [address] [place] [address] CARE  [address] [place] [address]  Dept: [phone]  Loc: [phone]    Subjective:      Patient ID: [id number]  Greyson Araujo is a 46 y.o. female    HPI: patient is here for follow up on her depression she is seeing psychiatry. Patient said she is making sure she takes her psych medication. Patient said she does like the psychiatrist.     Patient is taking adderall for sleep disorder. Patient said she does feel like she is getting help from her psychiatrist.     Patient is having pain in her right shoulder, patient said she is concerned for roator cuff, patient said it go down her arm. Patient does not remember injuring  her arm. Patient said it does make it difficult to make to get comfortable. Review of Systems   Constitutional:  Positive for fatigue. Negative for chills. Respiratory:  Negative for cough. Musculoskeletal:         Right shoulder pain    All other systems reviewed and are negative. Objective:   Physical Exam  Vitals reviewed. Constitutional:       Appearance: Normal appearance. Cardiovascular:      Rate and Rhythm: Normal rate and regular rhythm. Pulses: Normal pulses. Heart sounds: Normal heart sounds. Pulmonary:      Effort: Pulmonary effort is normal.      Breath sounds: Normal breath sounds. Musculoskeletal:      Right shoulder: Tenderness present. Decreased range of motion. Skin:     Capillary Refill: Capillary refill takes less than 2 seconds. Neurological:      General: No focal deficit present. Mental Status: She is alert and oriented to person, place, and time. Psychiatric:         Mood and Affect: Mood normal.         Behavior: Behavior normal.         Thought Content: Thought content normal.         Judgment: Judgment normal.         Plan:   1.  Moderate episode of recurrent major depressive disorder (HCC)

## 2023-11-21 NOTE — FLOWSHEET NOTE
[address] [place] [address] and Therapy [address] [place] [address], [address] [place] [address] office: [phone] fax: [phone]      Physical Therapy: TREATMENT/PROGRESS NOTE   Patient: Lennox Ringer (43 y.o. female)   Treatment Date: 2023   :  1970 MRN: [id number]   Visit #: 1   Insurance Allowable Auth Needed   BMN []Yes    [x]No    Insurance: Payor: MEDICARE / Plan: MEDICARE PART A AND B / Product Type: *No Product type* /   Insurance ID: [id number] - (Medicare)  Secondary Insurance (if applicable): [place]ICAID [place]   Treatment Diagnosis:     ICD-10-CM    1.  Right shoulder pain, unspecified chronicity  M25.511          Medical Diagnosis:    Acute pain of right shoulder [M25.511]   Referring Physician: Rojelio Montez, *  PCP: FEROZ Richmond CNP                             Plan of care signed (Y/N):     Date of Patient follow up with Physician:      Progress Report/POC: EVAL today  POC update due: (10 visits /OR Mendel Gopi, whichever is less) 2023     Precautions/ Contra-indications:                                                                                          Latex allergy:  NO  Pacemaker:    NO  Contraindications for Manipulation: None  Date of Surgery: N/A  Other:      Red Flags:  None    Preferred Language for Healthcare:   [x]English       []other:    SUBJECTIVE EXAMINATION     Patient Report/Comments: see eval     Test used Initial score  23   Pain Summary VAS 2-7    Functional questionnaire Upper Extremity functional Scale 68.75%    Other:                OBJECTIVE EXAMINATION     Observation:     Test measurements: see eval    Exercises/Interventions:     Therapeutic Ex (51869)   NMR re-education (00362) Sets/time/resistance/reps Notes/Cues/Progressions   Wall slide: flx/abd 10x10\" ea    Doorway pec/ER 10x10\"    IR S 10x10\"    UT S >>>              Scapular retraction  Black 10x10\"    Shoulder ext >>>

## 2023-11-22 ENCOUNTER — HOSPITAL ENCOUNTER (OUTPATIENT)
Dept: PHYSICAL THERAPY | Age: 53
Setting detail: THERAPIES SERIES
Discharge: HOME | End: 2023-11-22
Payer: MEDICARE

## 2023-11-22 DIAGNOSIS — M25.511 RIGHT SHOULDER PAIN, UNSPECIFIED CHRONICITY: Primary | ICD-10-CM

## 2023-11-22 PROCEDURE — 97110 THERAPEUTIC EXERCISES: CPT

## 2023-11-22 PROCEDURE — 97161 PT EVAL LOW COMPLEX 20 MIN: CPT

## 2023-12-06 ENCOUNTER — HOSPITAL ENCOUNTER (OUTPATIENT)
Dept: PHYSICAL THERAPY | Age: 53
Setting detail: THERAPIES SERIES
Discharge: HOME | End: 2023-12-06
Payer: MEDICARE

## 2023-12-06 PROCEDURE — 97112 NEUROMUSCULAR REEDUCATION: CPT

## 2023-12-06 PROCEDURE — 97110 THERAPEUTIC EXERCISES: CPT

## 2023-12-13 ENCOUNTER — APPOINTMENT (OUTPATIENT)
Dept: PHYSICAL THERAPY | Age: 53
End: 2023-12-13
Payer: MEDICARE

## 2023-12-20 ENCOUNTER — APPOINTMENT (OUTPATIENT)
Dept: PHYSICAL THERAPY | Age: 53
End: 2023-12-20
Payer: MEDICARE

## 2024-01-04 RX ORDER — GUANFACINE 1 MG/1
1 TABLET, EXTENDED RELEASE ORAL NIGHTLY
COMMUNITY
Start: 2023-11-10

## 2024-01-04 RX ORDER — VILAZODONE HYDROCHLORIDE 40 MG/1
TABLET ORAL
COMMUNITY
Start: 2023-11-10

## 2024-01-04 RX ORDER — OXYCODONE HYDROCHLORIDE AND ACETAMINOPHEN 5; 325 MG/1; MG/1
TABLET ORAL
COMMUNITY
Start: 2023-11-14

## 2024-01-08 ENCOUNTER — OFFICE VISIT (OUTPATIENT)
Dept: PRIMARY CARE CLINIC | Age: 54
End: 2024-01-08
Payer: MEDICARE

## 2024-01-08 VITALS
TEMPERATURE: 97.4 F | BODY MASS INDEX: 47.09 KG/M2 | SYSTOLIC BLOOD PRESSURE: 120 MMHG | RESPIRATION RATE: 16 BRPM | WEIGHT: 293 LBS | OXYGEN SATURATION: 99 % | HEIGHT: 66 IN | HEART RATE: 89 BPM | DIASTOLIC BLOOD PRESSURE: 86 MMHG

## 2024-01-08 DIAGNOSIS — E66.01 CLASS 3 SEVERE OBESITY DUE TO EXCESS CALORIES WITH SERIOUS COMORBIDITY AND BODY MASS INDEX (BMI) OF 45.0 TO 49.9 IN ADULT (HCC): ICD-10-CM

## 2024-01-08 DIAGNOSIS — F33.1 MODERATE EPISODE OF RECURRENT MAJOR DEPRESSIVE DISORDER (HCC): Primary | ICD-10-CM

## 2024-01-08 DIAGNOSIS — G47.33 OBSTRUCTIVE SLEEP APNEA SYNDROME: ICD-10-CM

## 2024-01-08 DIAGNOSIS — I50.9 CONGESTIVE HEART FAILURE, UNSPECIFIED HF CHRONICITY, UNSPECIFIED HEART FAILURE TYPE (HCC): ICD-10-CM

## 2024-01-08 DIAGNOSIS — D68.62 LUPUS ANTICOAGULANT DISORDER (HCC): ICD-10-CM

## 2024-01-08 PROCEDURE — G8417 CALC BMI ABV UP PARAM F/U: HCPCS | Performed by: NURSE PRACTITIONER

## 2024-01-08 PROCEDURE — 3079F DIAST BP 80-89 MM HG: CPT | Performed by: NURSE PRACTITIONER

## 2024-01-08 PROCEDURE — G8427 DOCREV CUR MEDS BY ELIG CLIN: HCPCS | Performed by: NURSE PRACTITIONER

## 2024-01-08 PROCEDURE — 99214 OFFICE O/P EST MOD 30 MIN: CPT | Performed by: NURSE PRACTITIONER

## 2024-01-08 PROCEDURE — 3074F SYST BP LT 130 MM HG: CPT | Performed by: NURSE PRACTITIONER

## 2024-01-08 PROCEDURE — 1036F TOBACCO NON-USER: CPT | Performed by: NURSE PRACTITIONER

## 2024-01-08 PROCEDURE — G8482 FLU IMMUNIZE ORDER/ADMIN: HCPCS | Performed by: NURSE PRACTITIONER

## 2024-01-08 PROCEDURE — 3017F COLORECTAL CA SCREEN DOC REV: CPT | Performed by: NURSE PRACTITIONER

## 2024-01-08 RX ORDER — DEXAMETHASONE 6 MG/1
6 TABLET ORAL DAILY
COMMUNITY
Start: 2023-12-12

## 2024-01-08 RX ORDER — TIRZEPATIDE 5 MG/.5ML
5 INJECTION, SOLUTION SUBCUTANEOUS WEEKLY
Qty: 3 ML | Refills: 0 | Status: SHIPPED | OUTPATIENT
Start: 2024-02-05

## 2024-01-08 RX ORDER — TIRZEPATIDE 2.5 MG/.5ML
0.5 INJECTION, SOLUTION SUBCUTANEOUS WEEKLY
Qty: 2 ML | Refills: 0 | Status: SHIPPED | OUTPATIENT
Start: 2024-01-08

## 2024-01-08 RX ORDER — DEXTROAMPHETAMINE SACCHARATE, AMPHETAMINE ASPARTATE, DEXTROAMPHETAMINE SULFATE AND AMPHETAMINE SULFATE 5; 5; 5; 5 MG/1; MG/1; MG/1; MG/1
TABLET ORAL
COMMUNITY
Start: 2023-12-26

## 2024-01-08 NOTE — PATIENT INSTRUCTIONS
Goal:   Protein at each meal.       30 grams of protein cheat sheet:  1 1/2 cups Greek yogurt  1 cup tempeh  5 oz shrimp (about 10 large shrimp)  1 cup cottage cheese  4 oz chicken (about half of a chicken breast)  5 oz grass-fed steak (slightly larger than a deck of cards)  1 1/2 cups tofu  2 cups black beans  5 oz wild-caught salmon (about one fillet)  5 eggs

## 2024-01-08 NOTE — PROGRESS NOTES
PROGRESS NOTE  Date of Service:  1/8/2024  Address[place] [facility]  [address]  [facility] [address]  Dept: [phone]  Loc: [phone]    Subjective:      Patient ID: [id number]  Millie Boyd is a 53 y.o. female    HPI: patient said that she did have covid before isela, she does feel better then she did. Patient said that she felt really bad. She said she is feeling better. She said she has lost 14lb but more related to the covid.     Patient said she has not been able to follow any nutritional diet, patient said now she will call bariatric doctor . Patient said she would like to be down below 300.     Review of Systems   Constitutional:  Positive for fatigue. Negative for chills.   Psychiatric/Behavioral:  Negative for self-injury, sleep disturbance and suicidal ideas. The patient is nervous/anxious.    All other systems reviewed and are negative.    Objective:   Physical Exam  Vitals reviewed.   Constitutional:       Appearance: She is obese.   Cardiovascular:      Rate and Rhythm: Normal rate and regular rhythm.      Pulses: Normal pulses.      Heart sounds: Normal heart sounds.   Pulmonary:      Effort: Pulmonary effort is normal.      Breath sounds: Normal breath sounds.   Skin:     Capillary Refill: Capillary refill takes less than 2 seconds.   Neurological:      General: No focal deficit present.      Mental Status: She is alert and oriented to person, place, and time.   Psychiatric:         Mood and Affect: Mood normal.         Behavior: Behavior normal.         Thought Content: Thought content normal.         Judgment: Judgment normal.         Plan:   1. Class 3 severe obesity due to excess calories with serious comorbidity and body mass index (BMI) of 45.0 to 49.9 in adult (HCC) patient would benefit from being on a weight loss medicine educated patient about the side effects of medication and that it is a newer med patient states

## 2024-01-10 ENCOUNTER — TELEPHONE (OUTPATIENT)
Dept: PRIMARY CARE CLINIC | Age: 54
End: 2024-01-10

## 2024-01-10 NOTE — TELEPHONE ENCOUNTER
Pharmacy comment: insurance does not cover medication, Zepbound.    MD change Rx    Scanned to Media for reference

## 2024-02-14 RX ORDER — AMOXICILLIN 875 MG/1
875 TABLET, COATED ORAL 2 TIMES DAILY
Qty: 20 TABLET | Refills: 0 | Status: SHIPPED | OUTPATIENT
Start: 2024-02-14 | End: 2024-02-24

## 2024-02-19 RX ORDER — CARIPRAZINE 4.5 MG/1
CAPSULE, GELATIN COATED ORAL
COMMUNITY
Start: 2024-01-31

## 2024-02-26 ENCOUNTER — HOSPITAL ENCOUNTER (OUTPATIENT)
Dept: MAMMOGRAPHY | Age: 54
Discharge: HOME | End: 2024-02-26
Payer: MEDICARE

## 2024-02-26 ENCOUNTER — HOSPITAL ENCOUNTER (OUTPATIENT)
Dept: CT IMAGING | Age: 54
Discharge: HOME | End: 2024-02-26
Attending: INTERNAL MEDICINE
Payer: MEDICARE

## 2024-02-26 ENCOUNTER — HOSPITAL ENCOUNTER (OUTPATIENT)
Dept: NON INVASIVE DIAGNOSTICS | Age: 54
Discharge: HOME | End: 2024-02-26
Attending: INTERNAL MEDICINE
Payer: MEDICARE

## 2024-02-26 VITALS — WEIGHT: 293 LBS | BODY MASS INDEX: 45.99 KG/M2 | HEIGHT: 67 IN

## 2024-02-26 DIAGNOSIS — E66.01 MORBID OBESITY WITH BMI OF 45.0-49.9, ADULT (HCC): ICD-10-CM

## 2024-02-26 DIAGNOSIS — I10 HYPERTENSION, ESSENTIAL: ICD-10-CM

## 2024-02-26 DIAGNOSIS — R06.02 SOB (SHORTNESS OF BREATH): ICD-10-CM

## 2024-02-26 DIAGNOSIS — Z12.31 ENCOUNTER FOR SCREENING MAMMOGRAM FOR MALIGNANT NEOPLASM OF BREAST: ICD-10-CM

## 2024-02-26 DIAGNOSIS — I50.9 CONGESTIVE HEART FAILURE, UNSPECIFIED HF CHRONICITY, UNSPECIFIED HEART FAILURE TYPE (HCC): ICD-10-CM

## 2024-02-26 PROCEDURE — 75571 CT HRT W/O DYE W/CA TEST: CPT

## 2024-02-26 PROCEDURE — 93306 TTE W/DOPPLER COMPLETE: CPT

## 2024-02-26 PROCEDURE — 77063 BREAST TOMOSYNTHESIS BI: CPT

## 2024-02-27 DIAGNOSIS — I25.84 CORONARY ARTERY CALCIFICATION: Primary | ICD-10-CM

## 2024-02-27 DIAGNOSIS — I25.10 CORONARY ARTERY CALCIFICATION: Primary | ICD-10-CM

## 2024-02-27 NOTE — TELEPHONE ENCOUNTER
----- Message from Ajit Mchugh MD sent at 2/26/2024 12:04 PM EST -----    CT calcium score:  The patient's calcium score is mildly elevated suggesting mild plaque. I would recommend that they be on asa 81mg and statin therapy (preferably lipitor 20mg or crestor 10mg).    Echo:  The test is shows hyperdynamic function. There is thickness in the walls that will be medically managed with medication and lifestyle improvements.

## 2024-02-27 NOTE — TELEPHONE ENCOUNTER
Attempted to reach patient. No answer. Left VM to call office to discuss. Will need to confirm pharmacy prior to routing Rx to [facility]. Reinforce routine exercise regimen and blood pressure management goal 130/80 or less.

## 2024-02-28 RX ORDER — ASPIRIN 81 MG/1
81 TABLET ORAL DAILY
Qty: 90 TABLET | Refills: 1 | Status: SHIPPED | OUTPATIENT
Start: 2024-02-28

## 2024-02-28 RX ORDER — ATORVASTATIN CALCIUM 20 MG/1
20 TABLET, FILM COATED ORAL DAILY
Qty: 90 TABLET | Refills: 1 | Status: SHIPPED | OUTPATIENT
Start: 2024-02-28

## 2024-03-01 ASSESSMENT — PATIENT HEALTH QUESTIONNAIRE - PHQ9
1. LITTLE INTEREST OR PLEASURE IN DOING THINGS: 3
9. THOUGHTS THAT YOU WOULD BE BETTER OFF DEAD, OR OF HURTING YOURSELF: NOT AT ALL
SUM OF ALL RESPONSES TO PHQ QUESTIONS 1-9: 18
7. TROUBLE CONCENTRATING ON THINGS, SUCH AS READING THE NEWSPAPER OR WATCHING TELEVISION: NEARLY EVERY DAY
SUM OF ALL RESPONSES TO PHQ QUESTIONS 1-9: 18
6. FEELING BAD ABOUT YOURSELF - OR THAT YOU ARE A FAILURE OR HAVE LET YOURSELF OR YOUR FAMILY DOWN: NEARLY EVERY DAY
3. TROUBLE FALLING OR STAYING ASLEEP: 3
SUM OF ALL RESPONSES TO PHQ QUESTIONS 1-9: 18
9. THOUGHTS THAT YOU WOULD BE BETTER OFF DEAD, OR OF HURTING YOURSELF: 0
1. LITTLE INTEREST OR PLEASURE IN DOING THINGS: NEARLY EVERY DAY
10. IF YOU CHECKED OFF ANY PROBLEMS, HOW DIFFICULT HAVE THESE PROBLEMS MADE IT FOR YOU TO DO YOUR WORK, TAKE CARE OF THINGS AT HOME, OR GET ALONG WITH OTHER PEOPLE: 3
2. FEELING DOWN, DEPRESSED OR HOPELESS: NEARLY EVERY DAY
SUM OF ALL RESPONSES TO PHQ QUESTIONS 1-9: 18
10. IF YOU CHECKED OFF ANY PROBLEMS, HOW DIFFICULT HAVE THESE PROBLEMS MADE IT FOR YOU TO DO YOUR WORK, TAKE CARE OF THINGS AT HOME, OR GET ALONG WITH OTHER PEOPLE: EXTREMELY DIFFICULT
SUM OF ALL RESPONSES TO PHQ9 QUESTIONS 1 & 2: 6
4. FEELING TIRED OR HAVING LITTLE ENERGY: NEARLY EVERY DAY
6. FEELING BAD ABOUT YOURSELF - OR THAT YOU ARE A FAILURE OR HAVE LET YOURSELF OR YOUR FAMILY DOWN: 3
4. FEELING TIRED OR HAVING LITTLE ENERGY: 3
8. MOVING OR SPEAKING SO SLOWLY THAT OTHER PEOPLE COULD HAVE NOTICED. OR THE OPPOSITE - BEING SO FIDGETY OR RESTLESS THAT YOU HAVE BEEN MOVING AROUND A LOT MORE THAN USUAL: NOT AT ALL
2. FEELING DOWN, DEPRESSED OR HOPELESS: 3
8. MOVING OR SPEAKING SO SLOWLY THAT OTHER PEOPLE COULD HAVE NOTICED. OR THE OPPOSITE, BEING SO FIGETY OR RESTLESS THAT YOU HAVE BEEN MOVING AROUND A LOT MORE THAN USUAL: 0
3. TROUBLE FALLING OR STAYING ASLEEP: NEARLY EVERY DAY
SUM OF ALL RESPONSES TO PHQ QUESTIONS 1-9: 18
5. POOR APPETITE OR OVEREATING: 0
5. POOR APPETITE OR OVEREATING: NOT AT ALL
7. TROUBLE CONCENTRATING ON THINGS, SUCH AS READING THE NEWSPAPER OR WATCHING TELEVISION: 3

## 2024-03-04 ENCOUNTER — OFFICE VISIT (OUTPATIENT)
Dept: PRIMARY CARE CLINIC | Age: 54
End: 2024-03-04
Payer: MEDICARE

## 2024-03-04 VITALS
BODY MASS INDEX: 45.99 KG/M2 | DIASTOLIC BLOOD PRESSURE: 80 MMHG | HEIGHT: 67 IN | SYSTOLIC BLOOD PRESSURE: 118 MMHG | RESPIRATION RATE: 16 BRPM | TEMPERATURE: 97.4 F | WEIGHT: 293 LBS | HEART RATE: 79 BPM | OXYGEN SATURATION: 96 %

## 2024-03-04 DIAGNOSIS — Z86.19 H/O COLD SORES: ICD-10-CM

## 2024-03-04 DIAGNOSIS — E66.01 MORBID OBESITY WITH BMI OF 45.0-49.9, ADULT (HCC): ICD-10-CM

## 2024-03-04 DIAGNOSIS — F41.9 ANXIETY: Primary | ICD-10-CM

## 2024-03-04 DIAGNOSIS — F33.1 MODERATE EPISODE OF RECURRENT MAJOR DEPRESSIVE DISORDER (HCC): ICD-10-CM

## 2024-03-04 PROCEDURE — G8482 FLU IMMUNIZE ORDER/ADMIN: HCPCS | Performed by: NURSE PRACTITIONER

## 2024-03-04 PROCEDURE — G8427 DOCREV CUR MEDS BY ELIG CLIN: HCPCS | Performed by: NURSE PRACTITIONER

## 2024-03-04 PROCEDURE — 99214 OFFICE O/P EST MOD 30 MIN: CPT | Performed by: NURSE PRACTITIONER

## 2024-03-04 PROCEDURE — 1036F TOBACCO NON-USER: CPT | Performed by: NURSE PRACTITIONER

## 2024-03-04 PROCEDURE — 3074F SYST BP LT 130 MM HG: CPT | Performed by: NURSE PRACTITIONER

## 2024-03-04 PROCEDURE — G8417 CALC BMI ABV UP PARAM F/U: HCPCS | Performed by: NURSE PRACTITIONER

## 2024-03-04 PROCEDURE — 3078F DIAST BP <80 MM HG: CPT | Performed by: NURSE PRACTITIONER

## 2024-03-04 PROCEDURE — 3017F COLORECTAL CA SCREEN DOC REV: CPT | Performed by: NURSE PRACTITIONER

## 2024-03-04 RX ORDER — VALACYCLOVIR HYDROCHLORIDE 1 G/1
TABLET, FILM COATED ORAL
Qty: 8 TABLET | Refills: 1 | Status: SHIPPED | OUTPATIENT
Start: 2024-03-04

## 2024-03-04 ASSESSMENT — ENCOUNTER SYMPTOMS
COUGH: 0
WHEEZING: 0
SHORTNESS OF BREATH: 0

## 2024-03-04 NOTE — PROGRESS NOTES
PROGRESS NOTE  Date of Service:  3/4/2024  Address: [facility]  [address]  [facility] [address]  Dept: [phone]  Loc: [phone]    Subjective:      Patient ID: [id number]  Millie Boyd is a 53 y.o. female    HPI: patient said she feels her antidepressants are working better, she said she still has some episodes of crying, she can definitely notice a difference.     Patient has added more protein,   Breakfast: hard boiled egg, milk 2%  Lunch:   Dinner: pork tenderloin, roast. Microwave mashed potatoes     Patient does like to snack: snack cakes, she said those types, more of sweet tooth. Patient said she likes the snack cakes.   - dunken sticks glazed.     Flavored water, soda regular-- one can a day.     Patient said she is trying to cook meals vs eating out, eating out 3 times a week.         Review of Systems   Constitutional:  Negative for chills, fatigue and fever.   Respiratory:  Negative for cough, shortness of breath and wheezing.    Cardiovascular:  Negative for chest pain.   All other systems reviewed and are negative.    Objective:   Physical Exam  Vitals reviewed.   Constitutional:       Appearance: Normal appearance.   Cardiovascular:      Rate and Rhythm: Normal rate and regular rhythm.      Pulses: Normal pulses.      Heart sounds: Normal heart sounds.   Pulmonary:      Breath sounds: Normal breath sounds.   Skin:     Capillary Refill: Capillary refill takes less than 2 seconds.   Neurological:      General: No focal deficit present.      Mental Status: She is alert and oriented to person, place, and time.   Psychiatric:         Mood and Affect: Mood normal.         Behavior: Behavior normal.         Thought Content: Thought content normal.         Judgment: Judgment normal.         Plan:   1. Anxiety patient is seeing psychiatry for her anxiety.  2. H/O cold sores patient has a history of cold sores had 1 recently would

## 2024-03-04 NOTE — PATIENT INSTRUCTIONS
Goal;   Fruit or vegetable with each meal.   30 g of protein with each meal.   Drink at least 64 ounces of water a day.

## 2024-04-01 RX ORDER — DEXTROAMPHETAMINE SACCHARATE, AMPHETAMINE ASPARTATE MONOHYDRATE, DEXTROAMPHETAMINE SULFATE AND AMPHETAMINE SULFATE 3.75; 3.75; 3.75; 3.75 MG/1; MG/1; MG/1; MG/1
2 CAPSULE, EXTENDED RELEASE ORAL DAILY
COMMUNITY
Start: 2024-02-23

## 2024-05-30 DIAGNOSIS — I87.2 VENOUS INSUFFICIENCY: ICD-10-CM

## 2024-05-30 RX ORDER — FUROSEMIDE 40 MG/1
40 TABLET ORAL PRN
Qty: 30 TABLET | Refills: 2 | Status: SHIPPED | OUTPATIENT
Start: 2024-05-30 | End: 2024-05-31 | Stop reason: SDUPTHER

## 2024-05-30 NOTE — TELEPHONE ENCOUNTER
Refill Request     CONFIRM preferred pharmacy with the patient.    If Mail Order Rx - Pend for 90 day refill.      Last Seen: Last Seen Department: Visit date not found  Last Seen by PCP: Visit date not found    Last Written: 6/19/2019    If no future appointment scheduled:  Review the last OV with PCP and review information for follow-up visit,  Route STAFF MESSAGE with patient name to the  Pool for scheduling with the following information:            -  Timing of next visit           -  Visit type ie Physical, OV, etc           -  Diagnoses/Reason ie. COPD, HTN - Do not use MEDICATION, Follow-up or CHECK UP - Give reason for visit      Next Appointment:   No future appointments.    Message sent to  to schedule appt with patient?  NO       Requested Prescriptions     Pending Prescriptions Disp Refills    furosemide (LASIX) 40 MG tablet 30 tablet 2     Sig: Take 1 tablet by mouth as needed (lower extremity swelling.)

## 2024-05-31 ENCOUNTER — TELEPHONE (OUTPATIENT)
Dept: PRIMARY CARE CLINIC | Age: 54
End: 2024-05-31

## 2024-05-31 DIAGNOSIS — I87.2 VENOUS INSUFFICIENCY: ICD-10-CM

## 2024-05-31 RX ORDER — FUROSEMIDE 40 MG/1
40 TABLET ORAL DAILY PRN
Qty: 30 TABLET | Refills: 2 | Status: SHIPPED | OUTPATIENT
Start: 2024-05-31

## 2024-05-31 NOTE — TELEPHONE ENCOUNTER
Jacquelin calling for clarification for Furosemide. They need a frequency for maximum daily dose.

## 2024-06-05 DIAGNOSIS — I87.2 VENOUS INSUFFICIENCY: ICD-10-CM

## 2024-06-05 RX ORDER — FUROSEMIDE 40 MG/1
40 TABLET ORAL DAILY PRN
Qty: 30 TABLET | Refills: 2 | OUTPATIENT
Start: 2024-06-05

## 2024-06-05 NOTE — TELEPHONE ENCOUNTER
Medication:   Requested Prescriptions     Pending Prescriptions Disp Refills    furosemide (LASIX) 40 MG tablet 30 tablet 2     Sig: Take 1 tablet by mouth daily as needed (lower extremity swelling.)        Last Filled: [id number] Pharmacy needs the frequency clarified on the sig     Patient Phone Number: [phone] (home)     Last appt: 3/4/2024   Next appt: Visit date not found    Last OARRS:       9/26/2018    12:49 PM   RX Monitoring   Attestation The Prescription Monitoring Report for this patient was reviewed today.   Acute Pain Prescriptions Severe pain not adequately treated with lower dose.   Periodic Controlled Substance Monitoring No signs of potential drug abuse or diversion identified.;Potential drug abuse or diversion identified, see note documentation.

## 2024-09-04 DIAGNOSIS — I25.10 CORONARY ARTERY CALCIFICATION: ICD-10-CM

## 2024-09-05 NOTE — TELEPHONE ENCOUNTER
Attempted to reach pt. Phone went straight to . If pt returns call please let them know they need to reach out to their pcp for refill. They haven't been seen by us and have no upcoming with us.

## 2024-09-06 RX ORDER — ATORVASTATIN CALCIUM 20 MG/1
20 TABLET, FILM COATED ORAL DAILY
Qty: 90 TABLET | Refills: 1 | OUTPATIENT
Start: 2024-09-06

## 2024-09-06 RX ORDER — ASPIRIN 81 MG/1
81 TABLET, COATED ORAL DAILY
Qty: 90 TABLET | Refills: 1 | OUTPATIENT
Start: 2024-09-06

## 2025-02-24 ENCOUNTER — HOSPITAL ENCOUNTER (OUTPATIENT)
Dept: PHYSICAL THERAPY | Age: 55
Setting detail: THERAPIES SERIES
Discharge: HOME | End: 2025-02-24
Payer: MEDICARE

## 2025-02-24 DIAGNOSIS — R26.89 DECREASED MOBILITY: ICD-10-CM

## 2025-02-24 DIAGNOSIS — G89.29 CHRONIC MIDLINE LOW BACK PAIN WITH RIGHT-SIDED SCIATICA: Primary | ICD-10-CM

## 2025-02-24 DIAGNOSIS — M54.41 CHRONIC MIDLINE LOW BACK PAIN WITH RIGHT-SIDED SCIATICA: Primary | ICD-10-CM

## 2025-02-24 DIAGNOSIS — R29.898 HIP WEAKNESS: ICD-10-CM

## 2025-02-24 PROCEDURE — 97161 PT EVAL LOW COMPLEX 20 MIN: CPT | Performed by: PHYSICAL THERAPIST

## 2025-02-24 PROCEDURE — 97530 THERAPEUTIC ACTIVITIES: CPT | Performed by: PHYSICAL THERAPIST

## 2025-02-24 NOTE — PLAN OF CARE
[facility] - Outpatient Rehabilitation and Therapy: [address] [place] [address] office: [phone] fax: [phone]     Physical Therapy Initial Evaluation Certification      Dear FEROZ Simon CNP    We had the pleasure of evaluating the following patient for physical therapy services at [facility].  A summary of our findings can be found in the initial assessment below.  This includes our plan of care.  If you have any questions or concerns regarding these findings, please do not hesitate to contact me at the office phone number listed above.  Thank you for the referral.     Physician Signature:_______________________________Date:__________________  By signing above (or electronic signature), therapist’s plan is approved by physician       Physical Therapy: TREATMENT/PROGRESS NOTE   Patient: Millie Boyd (54 y.o. female)   Examination Date: 2025   :  1970 MRN: [id number]   Visit #: 1   Insurance Allowable Auth Needed   [place] []Yes    [x]No    Insurance: Payor: MEDICARE / Plan: MEDICARE PART A AND B / Product Type: *No Product type* /   Insurance ID: [id number] - (Medicare)  Secondary Insurance (if applicable):    Treatment Diagnosis:     ICD-10-CM    1. Chronic midline low back pain with right-sided sciatica  M54.41     G89.29       2. Hip weakness  R29.898       3. Decreased mobility  R26.89          Medical Diagnosis:  Spondylosis without myelopathy or radiculopathy, lumbar region [M47.816]   Referring Physician: Loren Romero APRN - C*  PCP: Jeannette Connors APRN - CNP     Plan of care signed (Y/N):     Date of Patient follow up with Physician:      Progress Report/POC: EVAL today  POC update due: (10 visits /OR AUTH LIMITS, whichever is less)  3/26/2025                                             Medical History:  Comorbidities:  Hypertension  Rheumatoid Arthritis  Anxiety  Depression  Relevant Medical History: Gastroc

## 2025-02-25 ENCOUNTER — HOSPITAL ENCOUNTER (OUTPATIENT)
Dept: PHYSICAL THERAPY | Age: 55
Setting detail: THERAPIES SERIES
Discharge: HOME | End: 2025-02-25
Payer: MEDICARE

## 2025-02-25 PROCEDURE — 97113 AQUATIC THERAPY/EXERCISES: CPT

## 2025-02-25 PROCEDURE — 97150 GROUP THERAPEUTIC PROCEDURES: CPT

## 2025-02-25 NOTE — FLOWSHEET NOTE
[facility] - Outpatient Rehabilitation and Therapy: [address] [place] [address] office: [phone] fax: [phone]     Physical Therapy Daily Note      Dear FEROZ Simon CNP    We had the pleasure of evaluating the following patient for physical therapy services at [facility].  A summary of our findings can be found in the initial assessment below.  This includes our plan of care.  If you have any questions or concerns regarding these findings, please do not hesitate to contact me at the office phone number listed above.  Thank you for the referral.     Physician Signature:_______________________________Date:__________________  By signing above (or electronic signature), therapist’s plan is approved by physician       Physical Therapy: TREATMENT/PROGRESS NOTE   Patient: Millie Boyd (54 y.o. female)   Examination Date: 2025   :  1970 MRN: [id number]   Visit #: 2   Insurance Allowable Auth Needed   MN []Yes    [x]No    Insurance: Payor: MEDICARE / Plan: MEDICARE PART A AND B / Product Type: *No Product type* /   Insurance ID: [id number] - (Medicare)  Secondary Insurance (if applicable):    Treatment Diagnosis:     ICD-10-CM    1. Chronic midline low back pain with right-sided sciatica  M54.41     G89.29       2. Hip weakness  R29.898       3. Decreased mobility  R26.89          Medical Diagnosis:  Spondylosis without myelopathy or radiculopathy, lumbar region [M47.816]   Referring Physician: Loren Romero APRN - C*  PCP: Jeannette Connors APRN - CNP     Plan of care signed (Y/N):     Date of Patient follow up with Physician:      Progress Report/POC: EVAL today  POC update due: (10 visits /OR AUTH LIMITS, whichever is less)  3/26/2025                                             Medical History:  Comorbidities:  Hypertension  Rheumatoid Arthritis  Anxiety  Depression  Relevant Medical History: Gastroc sleeve, L knee

## 2025-02-26 ENCOUNTER — OFFICE VISIT (OUTPATIENT)
Dept: PRIMARY CARE CLINIC | Age: 55
End: 2025-02-26

## 2025-02-26 VITALS
HEART RATE: 97 BPM | WEIGHT: 293 LBS | DIASTOLIC BLOOD PRESSURE: 86 MMHG | TEMPERATURE: 97.8 F | SYSTOLIC BLOOD PRESSURE: 136 MMHG | RESPIRATION RATE: 99 BRPM | BODY MASS INDEX: 47.09 KG/M2 | HEIGHT: 66 IN

## 2025-02-26 DIAGNOSIS — Z71.89 ACP (ADVANCE CARE PLANNING): ICD-10-CM

## 2025-02-26 DIAGNOSIS — Z86.718 HISTORY OF DEEP VEIN THROMBOSIS OF LOWER EXTREMITY: ICD-10-CM

## 2025-02-26 DIAGNOSIS — Z12.31 ENCOUNTER FOR SCREENING MAMMOGRAM FOR MALIGNANT NEOPLASM OF BREAST: ICD-10-CM

## 2025-02-26 DIAGNOSIS — I25.10 CORONARY ARTERY CALCIFICATION: ICD-10-CM

## 2025-02-26 DIAGNOSIS — E78.2 MIXED HYPERLIPIDEMIA: ICD-10-CM

## 2025-02-26 DIAGNOSIS — I50.9 CONGESTIVE HEART FAILURE, UNSPECIFIED HF CHRONICITY, UNSPECIFIED HEART FAILURE TYPE (HCC): ICD-10-CM

## 2025-02-26 DIAGNOSIS — Z00.00 MEDICARE ANNUAL WELLNESS VISIT, SUBSEQUENT: Primary | ICD-10-CM

## 2025-02-26 DIAGNOSIS — I10 HYPERTENSION, ESSENTIAL: ICD-10-CM

## 2025-02-26 DIAGNOSIS — G47.33 OBSTRUCTIVE SLEEP APNEA SYNDROME: ICD-10-CM

## 2025-02-26 DIAGNOSIS — F33.1 MODERATE EPISODE OF RECURRENT MAJOR DEPRESSIVE DISORDER (HCC): ICD-10-CM

## 2025-02-26 DIAGNOSIS — Z98.84 HX OF BARIATRIC SURGERY: ICD-10-CM

## 2025-02-26 RX ORDER — ESCITALOPRAM OXALATE 5 MG/1
5 TABLET ORAL DAILY
COMMUNITY

## 2025-02-26 RX ORDER — ATORVASTATIN CALCIUM 20 MG/1
20 TABLET, FILM COATED ORAL DAILY
Qty: 90 TABLET | Refills: 1 | Status: SHIPPED | OUTPATIENT
Start: 2025-02-26

## 2025-02-26 RX ORDER — LOSARTAN POTASSIUM 50 MG/1
50 TABLET ORAL DAILY
Qty: 90 TABLET | Refills: 1 | Status: SHIPPED | OUTPATIENT
Start: 2025-02-26

## 2025-02-26 RX ORDER — ASPIRIN 81 MG/1
81 TABLET ORAL DAILY
Qty: 90 TABLET | Refills: 1 | Status: SHIPPED | OUTPATIENT
Start: 2025-02-26

## 2025-02-26 SDOH — ECONOMIC STABILITY: FOOD INSECURITY: WITHIN THE PAST 12 MONTHS, THE FOOD YOU BOUGHT JUST DIDN'T LAST AND YOU DIDN'T HAVE MONEY TO GET MORE.: NEVER TRUE

## 2025-02-26 SDOH — ECONOMIC STABILITY: FOOD INSECURITY: WITHIN THE PAST 12 MONTHS, YOU WORRIED THAT YOUR FOOD WOULD RUN OUT BEFORE YOU GOT MONEY TO BUY MORE.: NEVER TRUE

## 2025-02-26 ASSESSMENT — PATIENT HEALTH QUESTIONNAIRE - PHQ9
10. IF YOU CHECKED OFF ANY PROBLEMS, HOW DIFFICULT HAVE THESE PROBLEMS MADE IT FOR YOU TO DO YOUR WORK, TAKE CARE OF THINGS AT HOME, OR GET ALONG WITH OTHER PEOPLE: VERY DIFFICULT
4. FEELING TIRED OR HAVING LITTLE ENERGY: MORE THAN HALF THE DAYS
SUM OF ALL RESPONSES TO PHQ QUESTIONS 1-9: 13
1. LITTLE INTEREST OR PLEASURE IN DOING THINGS: NEARLY EVERY DAY
SUM OF ALL RESPONSES TO PHQ QUESTIONS 1-9: 13
5. POOR APPETITE OR OVEREATING: SEVERAL DAYS
6. FEELING BAD ABOUT YOURSELF - OR THAT YOU ARE A FAILURE OR HAVE LET YOURSELF OR YOUR FAMILY DOWN: MORE THAN HALF THE DAYS
SUM OF ALL RESPONSES TO PHQ QUESTIONS 1-9: 13
9. THOUGHTS THAT YOU WOULD BE BETTER OFF DEAD, OR OF HURTING YOURSELF: NOT AT ALL
SUM OF ALL RESPONSES TO PHQ9 QUESTIONS 1 & 2: 6
8. MOVING OR SPEAKING SO SLOWLY THAT OTHER PEOPLE COULD HAVE NOTICED. OR THE OPPOSITE, BEING SO FIGETY OR RESTLESS THAT YOU HAVE BEEN MOVING AROUND A LOT MORE THAN USUAL: NOT AT ALL
2. FEELING DOWN, DEPRESSED OR HOPELESS: NEARLY EVERY DAY
SUM OF ALL RESPONSES TO PHQ QUESTIONS 1-9: 13
7. TROUBLE CONCENTRATING ON THINGS, SUCH AS READING THE NEWSPAPER OR WATCHING TELEVISION: MORE THAN HALF THE DAYS
3. TROUBLE FALLING OR STAYING ASLEEP: NOT AT ALL

## 2025-02-26 NOTE — PROGRESS NOTES
Medicare Annual Wellness Visit    Millie Boyd is here for Medicare AWV, Depression, Anxiety, and Medication Refill    Assessment & Plan   Medicare annual wellness visit, subsequent- patient is struggling with mental health she does see psychiatrist at    Moderate episode of recurrent major depressive disorder (HCC)- patient is seeing psychiatrics at    Congestive heart failure, unspecified HF chronicity, unspecified heart failure type (HCC) patient does see cardiology recommend her follow up   -     CBC with Auto Differential  Mixed hyperlipidemia- patient is due for blood work is taking medication will draw today   -     LIPID PANEL  Hypertension, essential- blood pressure well controlled will continue current medication. Patient does see cardiology as well.   -     Comprehensive Metabolic Panel  -     losartan (COZAAR) 50 MG tablet; Take 1 tablet by mouth daily, Disp-90 tablet, R-1Normal  -     CBC with Auto Differential  Coronary artery calcification- patient is tolerating medication will continue. Patient denies chest pain or shortness of breath.   -     atorvastatin (LIPITOR) 20 MG tablet; Take 1 tablet by mouth daily, Disp-90 tablet, R-1Normal  -     aspirin 81 MG EC tablet; Take 1 tablet by mouth daily, Disp-90 tablet, R-1Normal  History of deep vein thrombosis of lower extremity- patient has reoccur ant dvt will continue xarelto   -     rivaroxaban (XARELTO) 20 MG TABS tablet; TAKE (1) TABLET BY MOUTH EVERY 24 HOURS. -PATIENT NEEDS APPOINTMENT FOR FURTHER REFILLS-, Disp-90 tablet, R-2Normal  ACP (advance care planning)- patient will talk to team for POA   Encounter for screening mammogram for malignant neoplasm of breast  -     [facility] NANCY DIGITAL SCREEN BILATERAL; Future  Obstructive sleep apnea syndrome- patient does have sleep apnea. Patient is due for blood work she is also on xerelto   -     CBC with Auto Differential  Hx of bariatric surgery- patient has had bariatric surgery recommend her follow

## 2025-02-26 NOTE — PATIENT INSTRUCTIONS

## 2025-02-27 ENCOUNTER — HOSPITAL ENCOUNTER (OUTPATIENT)
Dept: PHYSICAL THERAPY | Age: 55
Setting detail: THERAPIES SERIES
Discharge: HOME | End: 2025-02-27
Payer: MEDICARE

## 2025-02-27 DIAGNOSIS — E53.8 FOLATE DEFICIENCY: Primary | ICD-10-CM

## 2025-02-27 DIAGNOSIS — E55.9 VITAMIN D DEFICIENCY: ICD-10-CM

## 2025-02-27 LAB
25(OH)D3 SERPL-MCNC: 6.8 NG/ML
ALBUMIN SERPL-MCNC: 4 G/DL (ref 3.4–5)
ALBUMIN/GLOB SERPL: 1.7 {RATIO} (ref 1.1–2.2)
ALP SERPL-CCNC: 98 U/L (ref 40–129)
ALT SERPL-CCNC: 22 U/L (ref 10–40)
ANION GAP SERPL CALCULATED.3IONS-SCNC: 10 MMOL/L (ref 3–16)
AST SERPL-CCNC: 21 U/L (ref 15–37)
BASOPHILS # BLD: 0 K/UL (ref 0–0.2)
BASOPHILS NFR BLD: 0.8 %
BILIRUB SERPL-MCNC: 0.5 MG/DL (ref 0–1)
BUN SERPL-MCNC: 14 MG/DL (ref 7–20)
CALCIUM SERPL-MCNC: 8.9 MG/DL (ref 8.3–10.6)
CHLORIDE SERPL-SCNC: 108 MMOL/L (ref 99–110)
CHOLEST SERPL-MCNC: 172 MG/DL (ref 0–199)
CO2 SERPL-SCNC: 26 MMOL/L (ref 21–32)
CREAT SERPL-MCNC: 1 MG/DL (ref 0.6–1.1)
DEPRECATED RDW RBC AUTO: 15.2 % (ref 12.4–15.4)
EOSINOPHIL # BLD: 0.1 K/UL (ref 0–0.6)
EOSINOPHIL NFR BLD: 1.9 %
FOLATE SERPL-MCNC: 2.46 NG/ML (ref 4.78–24.2)
GFR SERPLBLD CREATININE-BSD FMLA CKD-EPI: 66 ML/MIN/{1.73_M2}
GLUCOSE SERPL-MCNC: 88 MG/DL (ref 70–99)
HCT VFR BLD AUTO: 36.2 % (ref 36–48)
HDLC SERPL-MCNC: 49 MG/DL (ref 40–60)
HGB BLD-MCNC: 12.1 G/DL (ref 12–16)
LDLC SERPL CALC-MCNC: 103 MG/DL
LYMPHOCYTES # BLD: 1.5 K/UL (ref 1–5.1)
LYMPHOCYTES NFR BLD: 31.1 %
MCH RBC QN AUTO: 25.3 PG (ref 26–34)
MCHC RBC AUTO-ENTMCNC: 33.5 G/DL (ref 31–36)
MCV RBC AUTO: 75.7 FL (ref 80–100)
MONOCYTES # BLD: 0.2 K/UL (ref 0–1.3)
MONOCYTES NFR BLD: 4.1 %
NEUTROPHILS # BLD: 2.9 K/UL (ref 1.7–7.7)
NEUTROPHILS NFR BLD: 62.1 %
PLATELET # BLD AUTO: 226 K/UL (ref 135–450)
PMV BLD AUTO: 8 FL (ref 5–10.5)
POTASSIUM SERPL-SCNC: 5 MMOL/L (ref 3.5–5.1)
PROT SERPL-MCNC: 6.3 G/DL (ref 6.4–8.2)
RBC # BLD AUTO: 4.79 M/UL (ref 4–5.2)
SODIUM SERPL-SCNC: 144 MMOL/L (ref 136–145)
TRIGL SERPL-MCNC: 98 MG/DL (ref 0–150)
VIT B12 SERPL-MCNC: 609 PG/ML (ref 211–911)
VLDLC SERPL CALC-MCNC: 20 MG/DL
WBC # BLD AUTO: 4.7 K/UL (ref 4–11)

## 2025-02-27 PROCEDURE — 97113 AQUATIC THERAPY/EXERCISES: CPT | Performed by: PHYSICAL THERAPIST

## 2025-02-27 PROCEDURE — 97150 GROUP THERAPEUTIC PROCEDURES: CPT | Performed by: PHYSICAL THERAPIST

## 2025-02-27 RX ORDER — ERGOCALCIFEROL 1.25 MG/1
50000 CAPSULE, LIQUID FILLED ORAL WEEKLY
Qty: 12 CAPSULE | Refills: 1 | Status: SHIPPED | OUTPATIENT
Start: 2025-02-27

## 2025-02-27 RX ORDER — FOLIC ACID 0.4 MG
400 TABLET ORAL DAILY
Qty: 90 TABLET | Refills: 1 | Status: SHIPPED | OUTPATIENT
Start: 2025-02-27

## 2025-02-27 NOTE — FLOWSHEET NOTE
[facility] - Outpatient Rehabilitation and Therapy: [address] [place] [address] office: [phone] fax: [phone]       Physical Therapy: TREATMENT/PROGRESS NOTE   Patient: Millie Boyd (54 y.o. female)   Examination Date: 2025   :  1970 MRN: [id number]   Visit #: 3   Insurance Allowable Auth Needed   MN []Yes    [x]No    Insurance: Payor: MEDICARE / Plan: MEDICARE PART A AND B / Product Type: *No Product type* /   Insurance ID: [id number] - (Medicare)  Secondary Insurance (if applicable):    Treatment Diagnosis:     ICD-10-CM    1. Chronic midline low back pain with right-sided sciatica  M54.41     G89.29       2. Hip weakness  R29.898       3. Decreased mobility  R26.89          Medical Diagnosis:  Spondylosis without myelopathy or radiculopathy, lumbar region [M47.816]   Referring Physician: Loren Romero APRN - C*  PCP: Jeannette Connors APRN - CNP     Plan of care signed (Y/N):     Date of Patient follow up with Physician:      Progress Report/POC: NO  POC update due: (10 visits /OR AUTH LIMITS, whichever is less)  3/26/2025                                             Medical History:  Comorbidities:  Hypertension  Rheumatoid Arthritis  Anxiety  Depression  Relevant Medical History: Gastroc sleeve, L knee replacement                                         Precautions/ Contra-indications:           Latex allergy:  NO  Pacemaker:    NO  Contraindications for Manipulation: None  Date of Surgery: na  Other:    Red Flags:  None    Suicide Screening:   The patient did not verbalize a primary behavioral concern, suicidal ideation, suicidal intent, or demonstrate suicidal behaviors.    Preferred Language for Healthcare:   [x] English       [] other:    SUBJECTIVE EXAMINATION     Patient stated complaint: No new complaints today.       Test used Initial score  2025   Pain Summary VAS 6 7   Functional questionnaire Modified Oswestry 58%    Other:

## 2025-03-04 ENCOUNTER — HOSPITAL ENCOUNTER (OUTPATIENT)
Dept: PHYSICAL THERAPY | Age: 55
Setting detail: THERAPIES SERIES
Discharge: HOME | End: 2025-03-04
Payer: MEDICARE

## 2025-03-04 PROCEDURE — 97113 AQUATIC THERAPY/EXERCISES: CPT

## 2025-03-04 PROCEDURE — 97150 GROUP THERAPEUTIC PROCEDURES: CPT

## 2025-03-04 NOTE — FLOWSHEET NOTE
Modified Oswestry 58%    Other:                OBJECTIVE EXAMINATION         Exercises/Interventions     Observation: see above    Exercise / Intervention Sets / Reps / Resistance Comments / Cueing                                                           Education/Home Exercise Program:   Patient education x10 minutes on aquatic therapy POC; including expectations, outcomes, progression to dry land therapy or HEP, and tour of facility.  HEP instruction not indicated at this time    Aquatic Exercises/Interventions     Aquatic Exercise Sets / Reps / Time  Aquatic Exercise Sets / Reps / Time   Warm-up / Ambulation  LE Exercise    Forward 2 laps   HR/TR 20    Sideways (lateral) 2 laps   Marching 2 mins    Backwards 2 laps   Mini-squats 20    Jog    3-way SLR 20 ea    Braiding    Hip Circles 20 ea    Bicycling 2 mins   Hamstring Curls x20        Knee Extension x20   Functional   Pelvic tilts 10x5 secs    Steps         Hand to [place]. knee         Wall push-ups   UE Exercises        Scap Squeezes         Shld Flex/Ext 20 w/ CP   Balance / Proprioception   Shld Abd/Add 20 w/ CP     SLS    Shld Horz: Abd/Add 20 w/ CP    Tandem Stance 2x20 secs   Shld IR/ER         Rowing    Stretching   Arm Circles     Gastroc/Soleus 2x30 secs   Chin Tucks     Hamstring    UT Shrugs/Rolls     SKTC    Push Downs     Piriformis    Punching     Hip flexor    Wall Pushups     Pec Stretch    PNF Diagonals     Post Deltoid            Other                       Aquatic Abbreviation Key    B = Belt   DB = Dumbells   T = Theratube     H = Hydrotone   N = Noodles   W = Weights     P = Paddles   S = Speedo equipment   K = Kickboard     ASSESSMENT     Today's Assessment: Patient had good tolerance to today's session, reporting appropriate fatigue with program completed. Able to progress  resistance and repetitions on TE above. Continues to display deficits in weakness and decreased dynamic stabilty which required ongoing skilled physical therapy and

## 2025-03-06 ENCOUNTER — HOSPITAL ENCOUNTER (OUTPATIENT)
Dept: PHYSICAL THERAPY | Age: 55
Setting detail: THERAPIES SERIES
Discharge: HOME | End: 2025-03-06
Payer: MEDICARE

## 2025-03-06 PROCEDURE — 97113 AQUATIC THERAPY/EXERCISES: CPT

## 2025-03-06 PROCEDURE — 97150 GROUP THERAPEUTIC PROCEDURES: CPT

## 2025-03-06 NOTE — FLOWSHEET NOTE
progress  [] Patient is not progressing as expected and requires additional follow up with physician  [] Other:     TREATMENT PLAN     Interventions:  Therapeutic Exercise (63339) including: strength training, ROM, and functional mobility  Therapeutic Activities (32393) including: functional mobility training and education.  Neuromuscular Re-education (08998) activation and proprioception, including postural re-education.    Gait Training (53417) for normalization of ambulation patterns and AD training.   Manual Therapy (15092) as indicated to include: Passive Range of Motion, Gr I-IV mobilizations, and Soft Tissue Mobilization  Aquatic Therapy (68842)    Plan: POC initiated as per evaluation    Electronically Signed by Julia Ramos PT  Date: 03/06/2025      Note: Portions of this note have been templated and/or copied from initial evaluation, reassessments and prior notes for documentation efficiency.      Note: If patient does not return for scheduled/recommended follow up visits, this note will serve as a discharge from care along with the most recent update on progress.

## 2025-03-11 ENCOUNTER — HOSPITAL ENCOUNTER (OUTPATIENT)
Dept: PHYSICAL THERAPY | Age: 55
Setting detail: THERAPIES SERIES
Discharge: HOME | End: 2025-03-11
Payer: MEDICARE

## 2025-03-11 PROCEDURE — 97113 AQUATIC THERAPY/EXERCISES: CPT

## 2025-03-11 PROCEDURE — 97150 GROUP THERAPEUTIC PROCEDURES: CPT

## 2025-03-11 NOTE — FLOWSHEET NOTE
[facility] - Outpatient Rehabilitation and Therapy: [address] [place] [address] office: [phone] fax: [phone]       Physical Therapy: TREATMENT/PROGRESS NOTE   Patient: Millie Boyd (54 y.o. female)   Examination Date: 2025   :  1970 MRN: [id number]   Visit #: 6   Insurance Allowable Auth Needed   MN []Yes    [x]No    Insurance: Payor: MEDICARE / Plan: MEDICARE PART A AND B / Product Type: *No Product type* /   Insurance ID: [id number] - (Medicare)  Secondary Insurance (if applicable):    Treatment Diagnosis:     ICD-10-CM    1. Chronic midline low back pain with right-sided sciatica  M54.41     G89.29       2. Hip weakness  R29.898       3. Decreased mobility  R26.89          Medical Diagnosis:  Spondylosis without myelopathy or radiculopathy, lumbar region [M47.816]   Referring Physician: Loren Romero APRN - C*  PCP: Jeannette Connors APRN - CNP     Plan of care signed (Y/N):     Date of Patient follow up with Physician:      Progress Report/POC: NO  POC update due: (10 visits /OR AUTH LIMITS, whichever is less)  3/26/2025                                             Medical History:  Comorbidities:  Hypertension  Rheumatoid Arthritis  Anxiety  Depression  Relevant Medical History: Gastroc sleeve, L knee replacement                                         Precautions/ Contra-indications:           Latex allergy:  NO  Pacemaker:    NO  Contraindications for Manipulation: None  Date of Surgery: na  Other:    Red Flags:  None    Suicide Screening:   The patient did not verbalize a primary behavioral concern, suicidal ideation, suicidal intent, or demonstrate suicidal behaviors.    Preferred Language for Healthcare:   [x] English       [] other:    SUBJECTIVE EXAMINATION     Patient stated complaint: Reports that her pain remains about the same today.        Test used Initial score  2025   Pain Summary VAS 6 6/10   Functional questionnaire

## 2025-03-13 ENCOUNTER — HOSPITAL ENCOUNTER (OUTPATIENT)
Dept: PHYSICAL THERAPY | Age: 55
Setting detail: THERAPIES SERIES
Discharge: HOME | End: 2025-03-13
Payer: MEDICARE

## 2025-03-13 PROCEDURE — 97113 AQUATIC THERAPY/EXERCISES: CPT | Performed by: PHYSICAL THERAPIST

## 2025-03-13 NOTE — FLOWSHEET NOTE
[facility] - Outpatient Rehabilitation and Therapy: [address] [place] [address] office: [phone] fax: [phone]       Physical Therapy: TREATMENT/PROGRESS NOTE   Patient: Millie Boyd (54 y.o. female)   Examination Date: 2025   :  1970 MRN: [id number]   Visit #: 7   Insurance Allowable Auth Needed   MN []Yes    [x]No    Insurance: Payor: MEDICARE / Plan: MEDICARE PART A AND B / Product Type: *No Product type* /   Insurance ID: [id number] - (Medicare)  Secondary Insurance (if applicable):    Treatment Diagnosis:     ICD-10-CM    1. Chronic midline low back pain with right-sided sciatica  M54.41     G89.29       2. Hip weakness  R29.898       3. Decreased mobility  R26.89          Medical Diagnosis:  Spondylosis without myelopathy or radiculopathy, lumbar region [M47.816]   Referring Physician: Loren Romero APRN - C*  PCP: Jeannette Connors APRN - CNP     Plan of care signed (Y/N):     Date of Patient follow up with Physician:      Progress Report/POC: NO  POC update due: (10 visits /OR AUTH LIMITS, whichever is less)  3/26/2025                                             Medical History:  Comorbidities:  Hypertension  Rheumatoid Arthritis  Anxiety  Depression  Relevant Medical History: Gastroc sleeve, L knee replacement                                         Precautions/ Contra-indications:           Latex allergy:  NO  Pacemaker:    NO  Contraindications for Manipulation: None  Date of Surgery: na  Other:    Red Flags:  None    Suicide Screening:   The patient did not verbalize a primary behavioral concern, suicidal ideation, suicidal intent, or demonstrate suicidal behaviors.    Preferred Language for Healthcare:   [x] English       [] other:    SUBJECTIVE EXAMINATION     Patient stated complaint: Pt. Reports today is a normal day symptom wise.  Average pain        Test used Initial score  2025   Pain Summary VAS 6 6/10   Functional

## 2025-03-18 ENCOUNTER — HOSPITAL ENCOUNTER (OUTPATIENT)
Dept: PHYSICAL THERAPY | Age: 55
Setting detail: THERAPIES SERIES
Discharge: HOME | End: 2025-03-18
Payer: MEDICARE

## 2025-03-18 PROCEDURE — 97113 AQUATIC THERAPY/EXERCISES: CPT

## 2025-03-18 PROCEDURE — 97150 GROUP THERAPEUTIC PROCEDURES: CPT

## 2025-03-18 NOTE — FLOWSHEET NOTE
and requires additional follow up with physician  [] Other:     TREATMENT PLAN     Interventions:  Therapeutic Exercise (27665) including: strength training, ROM, and functional mobility  Therapeutic Activities (99384) including: functional mobility training and education.  Neuromuscular Re-education (83023) activation and proprioception, including postural re-education.    Gait Training (47603) for normalization of ambulation patterns and AD training.   Manual Therapy (86641) as indicated to include: Passive Range of Motion, Gr I-IV mobilizations, and Soft Tissue Mobilization  Aquatic Therapy (95783)    Plan: POC initiated as per evaluation    Electronically Signed by Julia Ramos PT  Date: 03/18/2025      Note: Portions of this note have been templated and/or copied from initial evaluation, reassessments and prior notes for documentation efficiency.      Note: If patient does not return for scheduled/recommended follow up visits, this note will serve as a discharge from care along with the most recent update on progress.

## 2025-03-19 ENCOUNTER — TELEPHONE (OUTPATIENT)
Dept: PHYSICAL THERAPY | Age: 55
End: 2025-03-19

## 2025-03-19 NOTE — TELEPHONE ENCOUNTER
Called referring provider's office to request review, sign and return of outpatient rehabilitation plan of care. Spoke with Dallas's Voicemail.   Comments (if applicable):

## 2025-03-20 ENCOUNTER — APPOINTMENT (OUTPATIENT)
Dept: PHYSICAL THERAPY | Age: 55
End: 2025-03-20
Payer: MEDICARE

## 2025-03-25 ENCOUNTER — HOSPITAL ENCOUNTER (OUTPATIENT)
Dept: PHYSICAL THERAPY | Age: 55
Setting detail: THERAPIES SERIES
Discharge: HOME | End: 2025-03-25
Payer: MEDICARE

## 2025-03-25 PROCEDURE — 97150 GROUP THERAPEUTIC PROCEDURES: CPT

## 2025-03-25 PROCEDURE — 97113 AQUATIC THERAPY/EXERCISES: CPT

## 2025-03-25 NOTE — FLOWSHEET NOTE
[facility] - Outpatient Rehabilitation and Therapy: [address] [place] [address] office: [phone] fax: [phone]       Physical Therapy: TREATMENT/PROGRESS NOTE   Patient: Millie Boyd (54 y.o. female)   Examination Date: 2025   :  1970 MRN: [id number]   Visit #: 9   Insurance Allowable Auth Needed   MN []Yes    [x]No    Insurance: Payor: MEDICARE / Plan: MEDICARE PART A AND B / Product Type: *No Product type* /   Insurance ID: [id number] - (Medicare)  Secondary Insurance (if applicable):    Treatment Diagnosis:     ICD-10-CM    1. Chronic midline low back pain with right-sided sciatica  M54.41     G89.29       2. Hip weakness  R29.898       3. Decreased mobility  R26.89          Medical Diagnosis:  Spondylosis without myelopathy or radiculopathy, lumbar region [M47.816]   Referring Physician: Loren Romero APRN - C*  PCP: Jeannette Connors APRN - CNP     Plan of care signed (Y/N):     Date of Patient follow up with Physician:      Progress Report/POC: NO  POC update due: (10 visits /OR AUTH LIMITS, whichever is less)  3/26/2025                                             Medical History:  Comorbidities:  Hypertension  Rheumatoid Arthritis  Anxiety  Depression  Relevant Medical History: Gastroc sleeve, L knee replacement                                         Precautions/ Contra-indications:           Latex allergy:  NO  Pacemaker:    NO  Contraindications for Manipulation: None  Date of Surgery: na  Other:    Red Flags:  None    Suicide Screening:   The patient did not verbalize a primary behavioral concern, suicidal ideation, suicidal intent, or demonstrate suicidal behaviors.    Preferred Language for Healthcare:   [x] English       [] other:    SUBJECTIVE EXAMINATION     Patient stated complaint: Continues with pain, no changes since last visit.       Test used Initial score  2025   Pain Summary VAS 6 6/10   Functional questionnaire

## 2025-03-27 ENCOUNTER — HOSPITAL ENCOUNTER (OUTPATIENT)
Dept: PHYSICAL THERAPY | Age: 55
Setting detail: THERAPIES SERIES
Discharge: HOME | End: 2025-03-27
Payer: MEDICARE

## 2025-03-27 PROCEDURE — 97113 AQUATIC THERAPY/EXERCISES: CPT

## 2025-03-27 PROCEDURE — 97150 GROUP THERAPEUTIC PROCEDURES: CPT

## 2025-03-27 NOTE — FLOWSHEET NOTE
[facility] - Outpatient Rehabilitation and Therapy: [address] [place] [address] office: [phone] fax: [phone]       Physical Therapy: TREATMENT/PROGRESS NOTE   Patient: Millie Boyd (54 y.o. female)   Examination Date: 2025   :  1970 MRN: [id number]   Visit #: 10   Insurance Allowable Auth Needed   MN []Yes    [x]No    Insurance: Payor: MEDICARE / Plan: MEDICARE PART A AND B / Product Type: *No Product type* /   Insurance ID: [id number] - (Medicare)  Secondary Insurance (if applicable):    Treatment Diagnosis:     ICD-10-CM    1. Chronic midline low back pain with right-sided sciatica  M54.41     G89.29       2. Hip weakness  R29.898       3. Decreased mobility  R26.89          Medical Diagnosis:  Spondylosis without myelopathy or radiculopathy, lumbar region [M47.816]   Referring Physician: Loren Romero APRN - C*  PCP: Jeannette Connors APRN - CNP     Plan of care signed (Y/N):     Date of Patient follow up with Physician:      Progress Report/POC: NO  POC update due: (10 visits /OR AUTH LIMITS, whichever is less)  3/26/2025                                             Medical History:  Comorbidities:  Hypertension  Rheumatoid Arthritis  Anxiety  Depression  Relevant Medical History: Gastroc sleeve, L knee replacement                                         Precautions/ Contra-indications:           Latex allergy:  NO  Pacemaker:    NO  Contraindications for Manipulation: None  Date of Surgery: na  Other:    Red Flags:  None    Suicide Screening:   The patient did not verbalize a primary behavioral concern, suicidal ideation, suicidal intent, or demonstrate suicidal behaviors.    Preferred Language for Healthcare:   [x] English       [] other:    SUBJECTIVE EXAMINATION     Patient stated complaint: Continues with pain, no changes since last visit.       Test used Initial score  2025   Pain Summary VAS 6 6/10   Functional questionnaire

## 2025-04-01 ENCOUNTER — HOSPITAL ENCOUNTER (OUTPATIENT)
Dept: PHYSICAL THERAPY | Age: 55
Setting detail: THERAPIES SERIES
Discharge: HOME | End: 2025-04-01
Payer: MEDICARE

## 2025-04-01 PROCEDURE — 97150 GROUP THERAPEUTIC PROCEDURES: CPT

## 2025-04-01 PROCEDURE — 97113 AQUATIC THERAPY/EXERCISES: CPT

## 2025-04-01 NOTE — FLOWSHEET NOTE
[facility] - Outpatient Rehabilitation and Therapy: [address] [place] [address] office: [phone] fax: [phone]       Physical Therapy: TREATMENT/PROGRESS NOTE   Patient: Millie Boyd (54 y.o. female)   Examination Date: 2025   :  1970 MRN: [id number]   Visit #: 11   Insurance Allowable Auth Needed   MN []Yes    [x]No    Insurance: Payor: MEDICARE / Plan: MEDICARE PART A AND B / Product Type: *No Product type* /   Insurance ID: [id number] - (Medicare)  Secondary Insurance (if applicable):    Treatment Diagnosis:     ICD-10-CM    1. Chronic midline low back pain with right-sided sciatica  M54.41     G89.29       2. Hip weakness  R29.898       3. Decreased mobility  R26.89          Medical Diagnosis:  Spondylosis without myelopathy or radiculopathy, lumbar region [M47.816]   Referring Physician: Loren Romero APRN - C*  PCP: Jeannette Connors APRN - CNP     Plan of care signed (Y/N):     Date of Patient follow up with Physician:      Progress Report/POC: NO  POC update due: (10 visits /OR AUTH LIMITS, whichever is less)  3/26/2025                                             Medical History:  Comorbidities:  Hypertension  Rheumatoid Arthritis  Anxiety  Depression  Relevant Medical History: Gastroc sleeve, L knee replacement                                         Precautions/ Contra-indications:           Latex allergy:  NO  Pacemaker:    NO  Contraindications for Manipulation: None  Date of Surgery: na  Other:    Red Flags:  None    Suicide Screening:   The patient did not verbalize a primary behavioral concern, suicidal ideation, suicidal intent, or demonstrate suicidal behaviors.    Preferred Language for Healthcare:   [x] English       [] other:    SUBJECTIVE EXAMINATION     Patient stated complaint: Having more pain today, reports \"I am getting the pinching nerve feeling\". Denies any activity that would have increased pain.        Test used Initial

## 2025-04-03 ENCOUNTER — HOSPITAL ENCOUNTER (OUTPATIENT)
Dept: PHYSICAL THERAPY | Age: 55
Setting detail: THERAPIES SERIES
End: 2025-04-03
Payer: MEDICARE

## 2025-04-03 ENCOUNTER — HOSPITAL ENCOUNTER (OUTPATIENT)
Dept: PHYSICAL THERAPY | Age: 55
Setting detail: THERAPIES SERIES
Discharge: HOME | End: 2025-04-03
Payer: MEDICARE

## 2025-04-03 PROCEDURE — 97113 AQUATIC THERAPY/EXERCISES: CPT

## 2025-04-03 PROCEDURE — 97150 GROUP THERAPEUTIC PROCEDURES: CPT

## 2025-04-03 PROCEDURE — 97110 THERAPEUTIC EXERCISES: CPT

## 2025-04-03 PROCEDURE — 97530 THERAPEUTIC ACTIVITIES: CPT

## 2025-04-03 NOTE — FLOWSHEET NOTE
[facility] - Outpatient Rehabilitation and Therapy: [address] [place] [address] office: [phone] fax: [phone]       Physical Therapy: TREATMENT/PROGRESS NOTE   Patient: Millie Boyd (54 y.o. female)   Examination Date: 2025   :  1970 MRN: [id number]   Visit #: 12   Insurance Allowable Auth Needed   MN []Yes    [x]No    Insurance: Payor: MEDICARE / Plan: MEDICARE PART A AND B / Product Type: *No Product type* /   Insurance ID: [id number] - (Medicare)  Secondary Insurance (if applicable):    Treatment Diagnosis:     ICD-10-CM    1. Chronic midline low back pain with right-sided sciatica  M54.41     G89.29       2. Hip weakness  R29.898       3. Decreased mobility  R26.89          Medical Diagnosis:  Spondylosis without myelopathy or radiculopathy, lumbar region [M47.816]   Referring Physician: Loren Romero APRN - C*  PCP: Jeannette Connors APRN - CNP     Plan of care signed (Y/N):     Date of Patient follow up with Physician:      Progress Report/POC: NO  POC update due: (10 visits /OR AUTH LIMITS, whichever is less)  3/26/2025                                             Medical History:  Comorbidities:  Hypertension  Rheumatoid Arthritis  Anxiety  Depression  Relevant Medical History: Gastroc sleeve, L knee replacement                                         Precautions/ Contra-indications:           Latex allergy:  NO  Pacemaker:    NO  Contraindications for Manipulation: None  Date of Surgery: na  Other:    Red Flags:  None    Suicide Screening:   The patient did not verbalize a primary behavioral concern, suicidal ideation, suicidal intent, or demonstrate suicidal behaviors.    Preferred Language for Healthcare:   [x] English       [] other:    SUBJECTIVE EXAMINATION     Patient stated complaint:         Test used Initial score  2025   Pain Summary VAS 6 6/10   Functional questionnaire Modified Oswestry 58%    Other:

## 2025-04-03 NOTE — PLAN OF CARE
[facility] - Outpatient Rehabilitation and Therapy: [address] [place] [address] office: [phone] fax: [phone]    Physical Therapy Re-Certification Plan of Care    Dear FEROZ Simon CNP  ,    We had the pleasure of treating the following patient for physical therapy services at [facility] Outpatient Physical Therapy. A summary of our findings can be found in the updated assessment below.  This includes our plan of care.  If you have any questions or concerns regarding these findings, please do not hesitate to contact me at the office phone number checked above.  Thank you for the referral.     Physician Signature:________________________________Date:__________________  By signing above (or electronic signature), therapist's plan is approved by physician      Total Visits: 12     Overall Response to Treatment:  Patient is responding well to treatment and improvement is noted with regards to goals    Recommendation:    [x] Continue PT 2x / wk for 5 weeks.   [] Hold PT, pending MD visit   [] Discharge to [facility]. Follow up with PT or MD PRN.        Physical Therapy: TREATMENT/PROGRESS NOTE   Patient: Millie Boyd (54 y.o. female)   Examination Date: 2025   :  1970 MRN: [id number]   Visit #: 12   Insurance Allowable Auth Needed   MN []Yes    [x]No    Insurance: Payor: MEDICARE / Plan: MEDICARE PART A AND B / Product Type: *No Product type* /   Insurance ID: [id number] - (Medicare)  Secondary Insurance (if applicable):    Treatment Diagnosis:     ICD-10-CM    1. Chronic midline low back pain with right-sided sciatica  M54.41     G89.29       2. Hip weakness  R29.898       3. Decreased mobility  R26.89          Medical Diagnosis:  Spondylosis without myelopathy or radiculopathy, lumbar region [M47.816]   Referring Physician: Loren Romero APRN - C*  PCP: Jeannette Connors APRN - CNP     Plan of care signed (Y/N):     Date of Patient follow up with

## 2025-04-08 ENCOUNTER — APPOINTMENT (OUTPATIENT)
Dept: PHYSICAL THERAPY | Age: 55
End: 2025-04-08
Payer: MEDICARE

## 2025-04-10 ENCOUNTER — HOSPITAL ENCOUNTER (OUTPATIENT)
Dept: PHYSICAL THERAPY | Age: 55
Setting detail: THERAPIES SERIES
Discharge: HOME | End: 2025-04-10
Payer: MEDICARE

## 2025-04-10 PROCEDURE — 97150 GROUP THERAPEUTIC PROCEDURES: CPT

## 2025-04-10 PROCEDURE — 97113 AQUATIC THERAPY/EXERCISES: CPT

## 2025-04-10 NOTE — FLOWSHEET NOTE
up with physician  [] Other:     TREATMENT PLAN     Interventions:  Therapeutic Exercise (06050) including: strength training, ROM, and functional mobility  Therapeutic Activities (24121) including: functional mobility training and education.  Neuromuscular Re-education (84330) activation and proprioception, including postural re-education.    Gait Training (64741) for normalization of ambulation patterns and AD training.   Manual Therapy (99330) as indicated to include: Passive Range of Motion, Gr I-IV mobilizations, and Soft Tissue Mobilization  Aquatic Therapy (36994)    Plan:  Continue aquatic PT . Requesting additional visits at 2x/week for 5 more weeks.     Electronically Signed by Julia Ramos PT  Date: 04/10/2025      Note: Portions of this note have been templated and/or copied from initial evaluation, reassessments and prior notes for documentation efficiency.      Note: If patient does not return for scheduled/recommended follow up visits, this note will serve as a discharge from care along with the most recent update on progress.

## 2025-04-15 ENCOUNTER — HOSPITAL ENCOUNTER (OUTPATIENT)
Dept: PHYSICAL THERAPY | Age: 55
Setting detail: THERAPIES SERIES
Discharge: HOME | End: 2025-04-15
Payer: MEDICARE

## 2025-04-15 PROCEDURE — 97113 AQUATIC THERAPY/EXERCISES: CPT

## 2025-04-15 PROCEDURE — 97150 GROUP THERAPEUTIC PROCEDURES: CPT

## 2025-04-15 NOTE — FLOWSHEET NOTE
follow up with physician  [] Other:     TREATMENT PLAN     Interventions:  Therapeutic Exercise (90539) including: strength training, ROM, and functional mobility  Therapeutic Activities (74261) including: functional mobility training and education.  Neuromuscular Re-education (28948) activation and proprioception, including postural re-education.    Gait Training (90602) for normalization of ambulation patterns and AD training.   Manual Therapy (72080) as indicated to include: Passive Range of Motion, Gr I-IV mobilizations, and Soft Tissue Mobilization  Aquatic Therapy (81859)    Plan:  Continue aquatic PT . Requesting additional visits at 2x/week for 5 more weeks.     Electronically Signed by Julia Ramos PT  Date: 04/15/2025      Note: Portions of this note have been templated and/or copied from initial evaluation, reassessments and prior notes for documentation efficiency.      Note: If patient does not return for scheduled/recommended follow up visits, this note will serve as a discharge from care along with the most recent update on progress.

## 2025-04-22 ENCOUNTER — HOSPITAL ENCOUNTER (OUTPATIENT)
Dept: PHYSICAL THERAPY | Age: 55
Setting detail: THERAPIES SERIES
Discharge: HOME | End: 2025-04-22
Payer: MEDICARE

## 2025-04-22 PROCEDURE — 97150 GROUP THERAPEUTIC PROCEDURES: CPT

## 2025-04-22 PROCEDURE — 97113 AQUATIC THERAPY/EXERCISES: CPT

## 2025-04-22 NOTE — FLOWSHEET NOTE
[facility] - Outpatient Rehabilitation and Therapy: [address] [place] [address] office: [phone] fax: [phone]      Physical Therapy: TREATMENT/PROGRESS NOTE   Patient: Millie Boyd (54 y.o. female)   Examination Date: 2025   :  1970 MRN: [id number]   Visit #: 15   Insurance Allowable Auth Needed   MN []Yes    [x]No    Insurance: Payor: MEDICARE / Plan: MEDICARE PART A AND B / Product Type: *No Product type* /   Insurance ID: [id number] - (Medicare)  Secondary Insurance (if applicable):    Treatment Diagnosis:     ICD-10-CM    1. Chronic midline low back pain with right-sided sciatica  M54.41     G89.29       2. Hip weakness  R29.898       3. Decreased mobility  R26.89          Medical Diagnosis:  Spondylosis without myelopathy or radiculopathy, lumbar region [M47.816]   Referring Physician: Loren Romero APRN - C*  PCP: Jeannette Connors APRN - CNP     Plan of care signed (Y/N):     Date of Patient follow up with Physician:      Progress Report/POC: NO  POC update due: (10 visits /OR AUTH LIMITS, whichever is less)  5/3/25                                            Medical History:  Comorbidities:  Hypertension  Rheumatoid Arthritis  Anxiety  Depression  Relevant Medical History: Gastroc sleeve, L knee replacement                                         Precautions/ Contra-indications:           Latex allergy:  NO  Pacemaker:    NO  Contraindications for Manipulation: None  Date of Surgery: na  Other:    Red Flags:  None    Suicide Screening:   The patient did not verbalize a primary behavioral concern, suicidal ideation, suicidal intent, or demonstrate suicidal behaviors.    Preferred Language for Healthcare:   [x] English       [] other:    SUBJECTIVE EXAMINATION     Patient stated complaint:  Pt states that she is feeling good today, not having as much pain.        Test used Initial score  2025   Pain Summary VAS 6 5/10   Functional

## 2025-04-24 ENCOUNTER — HOSPITAL ENCOUNTER (OUTPATIENT)
Dept: PHYSICAL THERAPY | Age: 55
Setting detail: THERAPIES SERIES
Discharge: HOME | End: 2025-04-24
Payer: MEDICARE

## 2025-04-24 PROCEDURE — 97150 GROUP THERAPEUTIC PROCEDURES: CPT

## 2025-04-24 PROCEDURE — 97113 AQUATIC THERAPY/EXERCISES: CPT

## 2025-04-24 NOTE — FLOWSHEET NOTE
either patient    GOALS     Patient stated goal: Gain mobility for ADLs  [] Progressing: [x] Met: [] Not Met: [] Adjusted    Therapist goals for Patient:   Short Term Goals: To be achieved in: 2 weeks  1. Patient will tolerate up to 30 minutes of aquatic therapy treatment  [] Progressing: [] Met: [] Not Met: [] Adjusted  2. Patient will have a decrease in pain to <4/10 to facilitate improvement in movement, function, and ADLs as indicated by Functional Deficits.  [] Progressing: [] Met: [] Not Met: [] Adjusted    Long Term Goals: To be achieved in: 4 weeks  Disability index score of 35% or less for the Modified Oswestry to assist with reaching prior level of function.  [x] Progressing: [] Met: [] Not Met: [] Adjusted  Patient will improve 30 sec STS to >7 reps to demonstrate improvement in LE muscle endurance.  [] Progressing: [x] Met: [] Not Met: [] Adjusted  Pt will improve TUG to <12 seconds to demonstrate improved gait speed and reduce risk for falls.  [] Progressing: [x] Met: [] Not Met: [] Adjusted  Patient will improve 6 Min Walk Test to >800ft with no AD to demonstrate improved endurance for community ambulation.  [] Progressing: [x] Met: [] Not Met: [] Adjusted  Patient will demonstrate increased strength of proximal hips to at least 4+/5 throughout without pain to allow for proper functional mobility to enable patient to return to household ADLs over 10 minutes.   [x] Progressing: [] Met: [] Not Met: [] Adjusted    Overall Progression Towards Functional goals/ Treatment Progress Update:  [x] Patient is progressing as expected towards functional goals listed.    [] Progression is slowed due to complexities/Impairments listed.  [] Progression has been slowed due to co-morbidities.  [] Plan just implemented, too soon (<30days) to assess goals progression   [] Goals require adjustment due to lack of progress  [] Patient is not progressing as expected and requires additional follow up with physician  [] Other:

## 2025-04-29 ENCOUNTER — HOSPITAL ENCOUNTER (OUTPATIENT)
Dept: PHYSICAL THERAPY | Age: 55
Setting detail: THERAPIES SERIES
Discharge: HOME | End: 2025-04-29
Payer: MEDICARE

## 2025-04-29 PROCEDURE — 97113 AQUATIC THERAPY/EXERCISES: CPT

## 2025-04-29 PROCEDURE — 97150 GROUP THERAPEUTIC PROCEDURES: CPT

## 2025-04-29 NOTE — FLOWSHEET NOTE
[facility] - Outpatient Rehabilitation and Therapy: [address] [place] [address] office: [phone] fax: [phone]      Physical Therapy: TREATMENT/PROGRESS NOTE   Patient: Millie Boyd (55 y.o. female)   Examination Date: 2025   :  1970 MRN: [id number]   Visit #: 17   Insurance Allowable Auth Needed   MN []Yes    [x]No    Insurance: Payor: MEDICARE / Plan: MEDICARE PART A AND B / Product Type: *No Product type* /   Insurance ID: [id number] - (Medicare)  Secondary Insurance (if applicable):    Treatment Diagnosis:     ICD-10-CM    1. Chronic midline low back pain with right-sided sciatica  M54.41     G89.29       2. Hip weakness  R29.898       3. Decreased mobility  R26.89          Medical Diagnosis:  Spondylosis without myelopathy or radiculopathy, lumbar region [M47.816]   Referring Physician: Loren Romero APRN - C*  PCP: Jeannette Connors APRN - CNP     Plan of care signed (Y/N):     Date of Patient follow up with Physician:      Progress Report/POC: NO  POC update due: (10 visits /OR AUTH LIMITS, whichever is less)  5/3/25                                            Medical History:  Comorbidities:  Hypertension  Rheumatoid Arthritis  Anxiety  Depression  Relevant Medical History: Gastroc sleeve, L knee replacement                                         Precautions/ Contra-indications:           Latex allergy:  NO  Pacemaker:    NO  Contraindications for Manipulation: None  Date of Surgery: na  Other:    Red Flags:  None    Suicide Screening:   The patient did not verbalize a primary behavioral concern, suicidal ideation, suicidal intent, or demonstrate suicidal behaviors.    Preferred Language for Healthcare:   [x] English       [] other:    SUBJECTIVE EXAMINATION     Patient stated complaint:  Having more pain today, but        Test used Initial score  25   Pain Summary VAS 6 610 7/10   Functional questionnaire Modified

## 2025-05-01 ENCOUNTER — HOSPITAL ENCOUNTER (OUTPATIENT)
Dept: PHYSICAL THERAPY | Age: 55
Setting detail: THERAPIES SERIES
Discharge: HOME | End: 2025-05-01
Payer: MEDICARE

## 2025-05-01 PROCEDURE — 97530 THERAPEUTIC ACTIVITIES: CPT

## 2025-05-01 PROCEDURE — 97113 AQUATIC THERAPY/EXERCISES: CPT

## 2025-05-01 PROCEDURE — 97150 GROUP THERAPEUTIC PROCEDURES: CPT

## 2025-05-01 NOTE — PLAN OF CARE
Ext (20)               KNEE    Flex (140)       5 5      Ext (0)       5 5           ANKLE DF (20)       5 5      PF (50)                Inversion (30)                Eversion (20)                    Score 4/3/25 5/1/25 Comments   Timed Up and Go (TUG) Recorded Time: 13.8  13-14 seconds (~ 1 to 20% functional limitation)   8.0 seconds  9 sec   Device used: no AD   Sit to Stand (30 sec test) 4  9 10     Sit to Stand (x5 reps) NT 16.8 seconds 14.71 sec      6 min Walk Test 731 ft  913 ft 937 ft   Device used: no AD; slight antalgic gait on R LE after 4 minutes of walking    2 min Walk Test       Device used: N/A        Exercises/Interventions     Observation: see above    Exercise / Intervention Sets / Reps / Resistance Comments / Cueing                                                          Education/Home Exercise Program:     Aquatic Exercises/Interventions     Aquatic Exercise Sets / Reps / Time  Aquatic Exercise Sets / Reps / Time   Warm-up / Ambulation  LE Exercise    Forward 2 laps   HR/TR 25    Sideways (lateral) 2 laps   Marching 3 mins    Backwards 2 laps   Mini-squats 25    Jog    3-way SLR 25 ea    Braiding    Hip Circles 25 ea    Bicycling 2 mins   Hamstring Curls x25        Knee Extension x25   Functional   Pelvic tilts 10x5 secs    Steps X10 B        Hand to [place]. knee         Wall push-ups   UE Exercises        Scap Squeezes         Shld Flex/Ext 20 w/ CP   Balance / Proprioception   Shld Abd/Add 20 w/ CP     SLS    Shld Horz: Abd/Add 20 w/ CP    Tandem Stance 2x20 secs   Shld IR/ER         Rowing 20 w/ CP   Stretching   Arm Circles     Gastroc/Soleus 2x30 secs   Chin Tucks     Hamstring    UT Shrugs/Rolls     SKTC    Push Downs     Piriformis    Punching     Hip flexor    Wall Pushups     Pec Stretch    PNF Diagonals     Post Deltoid            Other        Ladder hang/stretch      Corner hang 2 min     Aquatic Abbreviation Key    B = Belt   DB = Dumbells   T = Theratube     H = Hydrotone   N =

## 2025-08-25 ENCOUNTER — CARE COORDINATION (OUTPATIENT)
Dept: CARE COORDINATION | Age: 55
End: 2025-08-25

## 2025-08-27 ENCOUNTER — OFFICE VISIT (OUTPATIENT)
Dept: PRIMARY CARE CLINIC | Age: 55
End: 2025-08-27

## 2025-08-27 VITALS
DIASTOLIC BLOOD PRESSURE: 86 MMHG | WEIGHT: 288 LBS | SYSTOLIC BLOOD PRESSURE: 134 MMHG | BODY MASS INDEX: 46.28 KG/M2 | HEART RATE: 64 BPM | RESPIRATION RATE: 16 BRPM | HEIGHT: 66 IN | OXYGEN SATURATION: 98 % | TEMPERATURE: 98 F

## 2025-08-27 DIAGNOSIS — I25.10 CORONARY ARTERY CALCIFICATION: ICD-10-CM

## 2025-08-27 DIAGNOSIS — I10 HYPERTENSION, ESSENTIAL: ICD-10-CM

## 2025-08-27 DIAGNOSIS — E53.8 FOLATE DEFICIENCY: ICD-10-CM

## 2025-08-27 DIAGNOSIS — Z86.718 HISTORY OF DEEP VEIN THROMBOSIS OF LOWER EXTREMITY: ICD-10-CM

## 2025-08-27 DIAGNOSIS — E55.9 VITAMIN D DEFICIENCY: Primary | ICD-10-CM

## 2025-08-27 DIAGNOSIS — H91.90 HOH (HARD OF HEARING): ICD-10-CM
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
## 2025-08-27 RX ORDER — FOLIC ACID 0.4 MG
400 TABLET ORAL DAILY
Qty: 90 TABLET | Refills: 1 | Status: SHIPPED | OUTPATIENT
Start: 2025-08-27

## 2025-08-27 RX ORDER — LOSARTAN POTASSIUM 50 MG/1
50 TABLET ORAL DAILY
Qty: 90 TABLET | Refills: 1 | Status: SHIPPED | OUTPATIENT
Start: 2025-08-27

## 2025-08-27 RX ORDER — TRIAMCINOLONE ACETONIDE 0.25 MG/G
CREAM TOPICAL
Qty: 80 G | Refills: 0 | Status: SHIPPED | OUTPATIENT
Start: 2025-08-27

## 2025-08-27 RX ORDER — ATORVASTATIN CALCIUM 20 MG/1
20 TABLET, FILM COATED ORAL DAILY
Qty: 90 TABLET | Refills: 1 | Status: SHIPPED | OUTPATIENT
Start: 2025-08-27

## 2025-08-27 RX ORDER — FERROUS SULFATE 325(65) MG
325 TABLET ORAL
COMMUNITY

## 2025-08-27 ASSESSMENT — ENCOUNTER SYMPTOMS
VOMITING: 0
RESPIRATORY NEGATIVE: 1
NAUSEA: 0
EYES NEGATIVE: 1
CONSTIPATION: 1
BACK PAIN: 1

## 2025-08-28 LAB
25(OH)D3 SERPL-MCNC: 14.5 NG/ML
ANION GAP SERPL CALCULATED.3IONS-SCNC: 10 MMOL/L (ref 3–16)
BUN SERPL-MCNC: 10 MG/DL (ref 7–20)
CALCIUM SERPL-MCNC: 9.3 MG/DL (ref 8.3–10.6)
CHLORIDE SERPL-SCNC: 104 MMOL/L (ref 99–110)
CO2 SERPL-SCNC: 29 MMOL/L (ref 21–32)
CREAT SERPL-MCNC: 0.9 MG/DL (ref 0.6–1.1)
FOLATE SERPL-MCNC: 12.7 NG/ML (ref 4.78–24.2)
GFR SERPLBLD CREATININE-BSD FMLA CKD-EPI: 75 ML/MIN/{1.73_M2}
GLUCOSE SERPL-MCNC: 92 MG/DL (ref 70–99)
POTASSIUM SERPL-SCNC: 5.1 MMOL/L (ref 3.5–5.1)
SODIUM SERPL-SCNC: 143 MMOL/L (ref 136–145)

## 2025-08-29 DIAGNOSIS — E55.9 VITAMIN D DEFICIENCY: ICD-10-CM

## 2025-08-29 RX ORDER — ERGOCALCIFEROL 1.25 MG/1
50000 CAPSULE, LIQUID FILLED ORAL WEEKLY
Qty: 12 CAPSULE | Refills: 1 | Status: SHIPPED | OUTPATIENT
Start: 2025-08-29

## (undated) DEVICE — SUCTION RESERVOIR 400CC LG VOL: Brand: CARDINAL HEALTH

## (undated) DEVICE — HOOD, PEEL-AWAY: Brand: FLYTE

## (undated) DEVICE — Z CONVERTED USE 2271377 BANDAGE COMPR W6INXL5YD EC E REB

## (undated) DEVICE — RIMMED SPEED PIN 45MM STERILE

## (undated) DEVICE — BRAIDED NYLON: Brand: SURGILON

## (undated) DEVICE — 3 BONE CEMENT MIXER: Brand: MIXEVAC

## (undated) DEVICE — GENESIS TROCHLEAR PIN 1/8 X 3: Brand: GENESIS

## (undated) DEVICE — SOLUTION IRRIG 2000ML 0.9% SOD CHL USP UROMATIC PLAS CONT

## (undated) DEVICE — STERILE POLYISOPRENE POWDER-FREE SURGICAL GLOVES: Brand: PROTEXIS

## (undated) DEVICE — SUTURE SURGLON SZ 1 L18IN NONABSORBABLE BLK GS 21 L37MM 1 2 8886196272

## (undated) DEVICE — DRESSING FOAM W4XL10IN SIL RECT ADH WTRPRF FLM BK W/ BORD

## (undated) DEVICE — SYSTEM SKIN CLSR 60CM 2-OCTYL CYNOACRLT W/ MESH DISPNS

## (undated) DEVICE — SUTURE VCRL + SZ 2-0 L18IN ABSRB UD CT1 L36MM 1/2 CIR VCP839D

## (undated) DEVICE — 35 ML SYRINGE LUER-LOCK TIP: Brand: MONOJECT

## (undated) DEVICE — STERILE LATEX POWDER-FREE SURGICAL GLOVESWITH NITRILE COATING: Brand: PROTEXIS

## (undated) DEVICE — DRILL BIT 3.2MM (1/8'') X 128.0MM

## (undated) DEVICE — SUTURE VCRL + SZ 0 L27IN ABSRB UD L36MM CT-1 1/2 CIR VCPP41D

## (undated) DEVICE — NEEDLE HYPO 20GA L1.5IN YEL POLYPR HUB S STL REG BVL STR

## (undated) DEVICE — TOWEL,OR,DSP,ST,BLUE,STD,8/PK,10PK/CS: Brand: MEDLINE

## (undated) DEVICE — PENCIL SMK EVAC ALL IN 1 DSGN ENH VISIBILITY IMPROVED AIR

## (undated) DEVICE — FORCEPS BX L240CM JAW DIA2.8MM L CAP W/ NDL MIC MESH TOOTH

## (undated) DEVICE — DRAIN SURG 10FR L1/8IN DIA3.2MM SIL CHN RND HUBLESS FULL

## (undated) DEVICE — SMARTGOWN BREATHABLE SURGICAL GOWN: Brand: CONVERTORS

## (undated) DEVICE — HANDPIECE SET WITH HIGH FLOW TIP AND SUCTION TUBE: Brand: INTERPULSE

## (undated) DEVICE — SUTURE MCRYL + SZ 4-0 L18IN ABSRB UD L19MM PS-2 3/8 CIR MCP496G

## (undated) DEVICE — SMARTGOWN SURGICAL GOWN, LARGE: Brand: CONVERTORS

## (undated) DEVICE — Device

## (undated) DEVICE — SYSTEM SKIN CLSR 22CM DERMBND PRINEO

## (undated) DEVICE — BLADE SURG SAW SAG S STL AGG 905MM LEN 185MM W 127MM THCK